# Patient Record
Sex: FEMALE | Race: WHITE | NOT HISPANIC OR LATINO | Employment: OTHER | URBAN - METROPOLITAN AREA
[De-identification: names, ages, dates, MRNs, and addresses within clinical notes are randomized per-mention and may not be internally consistent; named-entity substitution may affect disease eponyms.]

---

## 2017-03-08 ENCOUNTER — OFFICE VISIT (OUTPATIENT)
Dept: INTERNAL MEDICINE | Facility: CLINIC | Age: 52
End: 2017-03-08
Attending: INTERNAL MEDICINE
Payer: COMMERCIAL

## 2017-03-08 VITALS
BODY MASS INDEX: 20.88 KG/M2 | WEIGHT: 133 LBS | OXYGEN SATURATION: 99 % | HEIGHT: 67 IN | DIASTOLIC BLOOD PRESSURE: 72 MMHG | HEART RATE: 62 BPM | SYSTOLIC BLOOD PRESSURE: 112 MMHG

## 2017-03-08 DIAGNOSIS — R09.81 NASAL CONGESTION: ICD-10-CM

## 2017-03-08 DIAGNOSIS — Z00.00 ROUTINE ADULT HEALTH MAINTENANCE: ICD-10-CM

## 2017-03-08 DIAGNOSIS — E73.9 LACTOSE INTOLERANCE: Primary | ICD-10-CM

## 2017-03-08 PROCEDURE — G0442 ANNUAL ALCOHOL SCREEN 15 MIN: HCPCS | Mod: S$GLB,,, | Performed by: INTERNAL MEDICINE

## 2017-03-08 PROCEDURE — 99396 PREV VISIT EST AGE 40-64: CPT | Mod: 25,S$GLB,, | Performed by: INTERNAL MEDICINE

## 2017-03-08 PROCEDURE — G0444 DEPRESSION SCREEN ANNUAL: HCPCS | Mod: S$GLB,,, | Performed by: INTERNAL MEDICINE

## 2017-03-08 RX ORDER — ALPRAZOLAM 1 MG/1
TABLET ORAL
Qty: 30 TABLET | Refills: 5 | Status: SHIPPED | OUTPATIENT
Start: 2017-03-08 | End: 2017-09-13 | Stop reason: SDUPTHER

## 2017-03-08 NOTE — MR AVS SNAPSHOT
[facility]  [address], [address]  [facility] [address]  Phone: [phone]  Fax: [phone]                  Hailey Villalta   3/8/2017 10:00 AM   Office Visit    Description:  Female : 1965   Provider:  OFELIA Guallpa MD   Department:  [facility]           Reason for Visit     Follow-up     Sinusitis           Diagnoses this Visit        Comments    MS (multiple sclerosis)    -  Primary     Mild mitral and aortic regurgitation         Bicuspid aortic valve                To Do List           Future Appointments        Provider Department Dept Phone    3/9/2018 10:00 AM MD [facility] Figueroa [phone]      Goals (5 Years of Data)     None      Follow-Up and Disposition     Return in about 1 year (around 3/8/2018).       These Medications        Disp Refills Start End    alprazolam (XANAX) 1 MG tablet 30 tablet 5 3/8/2017     take 1/2 to 1 tablet by mouth NIGHTLY if needed    Pharmacy: [facility] [address] #: [phone]         [facility] On Call     [facility] On Call Nurse Care Line -  Assistance  Registered nurses in the [facility] On Call Center provide clinical advisement, health education, appointment booking, and other advisory services.  Call for this free service at [phone].             Medications           Message regarding Medications     Verify the changes and/or additions to your medication regime listed below are the same as discussed with your clinician today.  If any of these changes or additions are incorrect, please notify your healthcare provider.             Verify that the below list of medications is an accurate representation of the medications you are currently taking.  If none reported, the list may be blank. If incorrect, please contact your healthcare provider. Carry this list with you in case of emergency.           Current Medications     alprazolam (XANAX) 1 MG tablet take 1/2 to 1  "tablet by mouth NIGHTLY if needed    cetirizine (ZYRTEC) 10 MG tablet Take 10 mg by mouth once daily.    cyanocobalamin (VITAMIN B-12) 1000 MCG tablet Place 1,000 mcg under the tongue once daily.    epinephrine 0.3 mg/0.3 mL Cmpk Inject 0.3 mLs (0.3 mg total) into the muscle daily as needed.    meclizine (ANTIVERT) 25 mg tablet Take 1 tablet (25 mg total) by mouth 3 (three) times daily as needed. For vertigo/dizziness    multivitamin-zinc gluconate (MULTIVITAMINS-FORTIFIED A-D-K) 5 mg/mL Drop Take by mouth once daily.      ondansetron (ZOFRAN-ODT) 8 MG TbDL 1/2-1 tab every 8 hours as needed    pantoprazole (PROTONIX) 40 MG tablet Take 1 tablet (40 mg total) by mouth once daily.           Clinical Reference Information           Your Vitals Were     BP Pulse Height Weight Last Period SpO2    112/72 62 5' 7" (1.702 m) 60.3 kg (133 lb) 02/08/2017 (Exact Date) 99%    BMI                20.83 kg/m2          Blood Pressure          Most Recent Value    BP  112/72      Allergies as of 3/8/2017     Insect Venom    Bee Sting [Allergen Ext-venom-honey Bee]    Codeine    Prednisone      Immunizations Administered on Date of Encounter - 3/8/2017     None      Instructions    Please call [facility] radiology at [phone] to schedule your radiology test(s) - Mammogram       Language Assistance Services     ATTENTION: Language assistance services are available, free of charge. Please call [phone].      ATENCIÓN: Si habla kellee, tiene a rebolledo disposición servicios gratuitos de asistencia lingüística. Llame al [phone].     CHÚ Ý: N?u b?n nói Ti?ng Vi?t, có các d?ch v? h? tr? ngôn ng? mi?n phí dành cho b?n. G?i s? [phone].         [facility] complies with applicable Federal civil rights laws and does not discriminate on the basis of race, color, national origin, age, disability, or sex.        "

## 2017-03-08 NOTE — PROGRESS NOTES
Subjective:       Patient ID: Hailey Villalta is a 51 y.o. female.    Chief Complaint: Follow-up (6 month/ wants paper scrit for xanax) and Sinusitis (sinus drip / throat irritation)    HPI Comments: Soem nasal congestion.    Review of Systems   Constitutional: Negative.    Respiratory: Negative for shortness of breath.    Cardiovascular: Negative for chest pain.   Psychiatric/Behavioral: Negative for dysphoric mood.       Objective:      Physical Exam   Constitutional: She appears well-developed and well-nourished.   HENT:   Head: Normocephalic.   Eyes: Pupils are equal, round, and reactive to light.   Cardiovascular: Normal rate and regular rhythm.  Exam reveals no friction rub.    Murmur heard.   Systolic murmur is present with a grade of 2/6   Pulmonary/Chest: Effort normal.   Neurological: She is alert.       Assessment:       1. MS (multiple sclerosis)    2. Mild mitral and aortic regurgitation    3. Bicuspid aortic valve    4. Lactose intolerance        Plan:       Per orders and D/C instructions.  Flonase for nasal congestion.    Screening: The patient was screened for depression with the PHQ2 questionnaire and possible health consequences were discussed with the patient, who understands (15 minutes spent). The patient was screened for the misuse of alcohol, by asking the number of drinks per average week, and if pt has had more than 4 drinks (more than 3 for women and elderly) in 1 day within the past year. The health and legal consequences of misuse were discussed (15 minutes spent). The patient was screened for obesity (BMI>30), If the current BMI > 30, then the possible consequences of obesity, as well as the benefits of diet, exercise, and weight loss were discussed (15 minutes spent).

## 2017-07-11 ENCOUNTER — OFFICE VISIT (OUTPATIENT)
Dept: INTERNAL MEDICINE | Facility: CLINIC | Age: 52
End: 2017-07-11
Attending: INTERNAL MEDICINE
Payer: COMMERCIAL

## 2017-07-11 VITALS
OXYGEN SATURATION: 99 % | SYSTOLIC BLOOD PRESSURE: 110 MMHG | TEMPERATURE: 99 F | WEIGHT: 135 LBS | HEART RATE: 69 BPM | BODY MASS INDEX: 21.19 KG/M2 | HEIGHT: 67 IN | DIASTOLIC BLOOD PRESSURE: 70 MMHG

## 2017-07-11 DIAGNOSIS — G35 MS (MULTIPLE SCLEROSIS): Chronic | ICD-10-CM

## 2017-07-11 DIAGNOSIS — K59.09 CONSTIPATION, CHRONIC: ICD-10-CM

## 2017-07-11 DIAGNOSIS — J45.909 ASTHMA, CURRENTLY DORMANT: Primary | ICD-10-CM

## 2017-07-11 DIAGNOSIS — S61.216D LACERATION OF RIGHT LITTLE FINGER WITHOUT FOREIGN BODY WITHOUT DAMAGE TO NAIL, SUBSEQUENT ENCOUNTER: ICD-10-CM

## 2017-07-11 DIAGNOSIS — J02.9 SORE THROAT: ICD-10-CM

## 2017-07-11 DIAGNOSIS — J32.9 SINUSITIS, UNSPECIFIED CHRONICITY, UNSPECIFIED LOCATION: ICD-10-CM

## 2017-07-11 DIAGNOSIS — R06.2 WHEEZING: ICD-10-CM

## 2017-07-11 PROCEDURE — 96372 THER/PROPH/DIAG INJ SC/IM: CPT | Mod: S$GLB,,, | Performed by: INTERNAL MEDICINE

## 2017-07-11 PROCEDURE — 99214 OFFICE O/P EST MOD 30 MIN: CPT | Mod: 25,S$GLB,, | Performed by: INTERNAL MEDICINE

## 2017-07-11 RX ORDER — DOXYCYCLINE HYCLATE 100 MG
100 TABLET ORAL 2 TIMES DAILY
Qty: 14 TABLET | Refills: 0 | Status: SHIPPED | OUTPATIENT
Start: 2017-07-11 | End: 2017-08-21

## 2017-07-11 RX ORDER — CEFTRIAXONE 1 G/1
1 INJECTION, POWDER, FOR SOLUTION INTRAMUSCULAR; INTRAVENOUS
Status: COMPLETED | OUTPATIENT
Start: 2017-07-11 | End: 2017-07-11

## 2017-07-11 RX ORDER — TRIAMCINOLONE ACETONIDE 40 MG/ML
40 INJECTION, SUSPENSION INTRA-ARTICULAR; INTRAMUSCULAR
Status: COMPLETED | OUTPATIENT
Start: 2017-07-11 | End: 2017-07-11

## 2017-07-11 RX ORDER — METHYLPREDNISOLONE ACETATE 80 MG/ML
80 INJECTION, SUSPENSION INTRA-ARTICULAR; INTRALESIONAL; INTRAMUSCULAR; SOFT TISSUE ONCE
Status: COMPLETED | OUTPATIENT
Start: 2017-07-11 | End: 2017-07-11

## 2017-07-11 RX ADMIN — TRIAMCINOLONE ACETONIDE 40 MG: 40 INJECTION, SUSPENSION INTRA-ARTICULAR; INTRAMUSCULAR at 03:07

## 2017-07-11 RX ADMIN — CEFTRIAXONE 1 G: 1 INJECTION, POWDER, FOR SOLUTION INTRAMUSCULAR; INTRAVENOUS at 03:07

## 2017-07-11 RX ADMIN — METHYLPREDNISOLONE ACETATE 80 MG: 80 INJECTION, SUSPENSION INTRA-ARTICULAR; INTRALESIONAL; INTRAMUSCULAR; SOFT TISSUE at 03:07

## 2017-07-11 NOTE — PROGRESS NOTES
Subjective:       Patient ID: Hailey Villalta is a 51 y.o. female.    Chief Complaint: Sinus Problem (nasal congestion / drip / green mucus); Headache; and Sore Throat    Cut right 5th finger night of 7/09/17 with a veggie cutter. Got stiches in .  Several days sinus congestion with green D/C.  Subj fever.  Sore throat since yest.      Sinus Problem   This is a new problem. The current episode started in the past 7 days. The problem has been rapidly worsening since onset. Associated symptoms include congestion, headaches and a sore throat. Pertinent negatives include no shortness of breath.   Headache    This is a new problem. The current episode started in the past 7 days. The problem has been unchanged. Associated symptoms include a sore throat.   Sore Throat    This is a new problem. The current episode started yesterday. The problem has been rapidly worsening. Associated symptoms include congestion and headaches. Pertinent negatives include no shortness of breath.     Review of Systems   Constitutional: Negative.    HENT: Positive for congestion and sore throat.    Respiratory: Positive for wheezing. Negative for shortness of breath.    Cardiovascular: Negative for chest pain.   Neurological: Positive for headaches.       Objective:      Physical Exam   Constitutional: She appears well-developed and well-nourished.   HENT:   Head: Normocephalic.   Eyes: Pupils are equal, round, and reactive to light.   Cardiovascular: Normal rate and regular rhythm.  Exam reveals no friction rub.    Murmur heard.   Systolic murmur is present with a grade of 2/6   Pulmonary/Chest: Effort normal. She has wheezes.   Musculoskeletal:   Sutures in right 5th finger tip with some ecchymosis.   Neurological: She is alert.       Assessment:       1. Asthma, currently dormant    2. Constipation, chronic    3. MS (multiple sclerosis)    4. Sinusitis, unspecified chronicity, unspecified location    5. Sore throat    6.  Wheezing    7. Laceration of right little finger without foreign body without damage to nail, subsequent encounter        Plan:       Per orders and D/C instructions.  Continue meds/diet for Asthma and MS, which are stable.  Rocephin, Steroid shot, and Doxy for sinusitis, sore throat, and wheezing.  Rto 7/20 for suture removal.

## 2017-07-20 ENCOUNTER — OFFICE VISIT (OUTPATIENT)
Dept: INTERNAL MEDICINE | Facility: CLINIC | Age: 52
End: 2017-07-20
Attending: INTERNAL MEDICINE
Payer: COMMERCIAL

## 2017-07-20 VITALS
HEART RATE: 74 BPM | SYSTOLIC BLOOD PRESSURE: 120 MMHG | BODY MASS INDEX: 21.19 KG/M2 | HEIGHT: 67 IN | OXYGEN SATURATION: 98 % | WEIGHT: 135 LBS | DIASTOLIC BLOOD PRESSURE: 68 MMHG

## 2017-07-20 DIAGNOSIS — D50.9 IRON DEFICIENCY ANEMIA, UNSPECIFIED IRON DEFICIENCY ANEMIA TYPE: ICD-10-CM

## 2017-07-20 DIAGNOSIS — J45.909 ASTHMA, CURRENTLY DORMANT: ICD-10-CM

## 2017-07-20 DIAGNOSIS — E03.9 HYPOTHYROIDISM, UNSPECIFIED TYPE: ICD-10-CM

## 2017-07-20 DIAGNOSIS — Z00.00 ROUTINE ADULT HEALTH MAINTENANCE: Primary | ICD-10-CM

## 2017-07-20 DIAGNOSIS — G35 MS (MULTIPLE SCLEROSIS): Primary | Chronic | ICD-10-CM

## 2017-07-20 DIAGNOSIS — E78.9 DISORDER OF LIPID METABOLISM: ICD-10-CM

## 2017-07-20 DIAGNOSIS — I08.0 MILD MITRAL AND AORTIC REGURGITATION: ICD-10-CM

## 2017-07-20 DIAGNOSIS — E55.9 VITAMIN D DEFICIENCY: ICD-10-CM

## 2017-07-20 DIAGNOSIS — D51.0 PERNICIOUS ANEMIA: ICD-10-CM

## 2017-07-20 DIAGNOSIS — E73.9 LACTOSE INTOLERANCE: ICD-10-CM

## 2017-07-20 DIAGNOSIS — R79.89 OTHER ABNORMAL BLOOD CHEMISTRY: ICD-10-CM

## 2017-07-20 DIAGNOSIS — S61.219D LACERATION OF FINGER OF RIGHT HAND WITHOUT FOREIGN BODY WITHOUT DAMAGE TO NAIL, UNSPECIFIED FINGER, SUBSEQUENT ENCOUNTER: ICD-10-CM

## 2017-07-20 DIAGNOSIS — K21.9 GASTROESOPHAGEAL REFLUX DISEASE, ESOPHAGITIS PRESENCE NOT SPECIFIED: ICD-10-CM

## 2017-07-20 PROCEDURE — 99214 OFFICE O/P EST MOD 30 MIN: CPT | Mod: S$GLB,,, | Performed by: INTERNAL MEDICINE

## 2017-07-20 RX ORDER — PANTOPRAZOLE SODIUM 40 MG/1
40 TABLET, DELAYED RELEASE ORAL DAILY
Qty: 30 TABLET | Refills: 1 | Status: SHIPPED | OUTPATIENT
Start: 2017-07-20 | End: 2018-03-09 | Stop reason: SDUPTHER

## 2017-07-20 NOTE — PROGRESS NOTES
Subjective:       Patient ID: Hailey Villalta is a 51 y.o. female.    Chief Complaint: Suture / Staple Removal and GI Problem (burning,cramping / wants bloddwork if possible)    Recent abd discomfort. Soem pain, nausea, and diarrhea.      Suture / Staple Removal   The sutures were placed 11 to 14 days ago. She tried antibiotic ointment use since the wound repair. The treatment provided significant relief. There has been clear discharge from the wound. There is no redness present. There is no swelling present. The pain has no pain. She has no difficulty moving the affected extremity or digit.   GI Problem   The primary symptoms include abdominal pain and diarrhea.     Review of Systems   Constitutional: Negative.    Respiratory: Negative for shortness of breath.    Cardiovascular: Negative for chest pain.   Gastrointestinal: Positive for abdominal distention, abdominal pain and diarrhea.       Objective:      Physical Exam   Constitutional: She appears well-developed and well-nourished.   HENT:   Head: Normocephalic.   Eyes: Pupils are equal, round, and reactive to light.   Cardiovascular: Normal rate and regular rhythm.  Exam reveals no friction rub.    Murmur heard.   Systolic murmur is present with a grade of 2/6   Pulmonary/Chest: Effort normal.   Musculoskeletal:   Sutures in right 5th finger tip with some ecchymosis.   Neurological: She is alert.       Assessment:       1. MS (multiple sclerosis)    2. Mild mitral and aortic regurgitation    3. Asthma, currently dormant    4. Gastroesophageal reflux disease, esophagitis presence not specified    5. Lactose intolerance    6. Laceration of finger of right hand without foreign body without damage to nail, unspecified finger, subsequent encounter        Plan:       Per orders and D/C instructions.  Continue meds/diet for MS, AVR, MVR, and Asthma, which are stable.     Avoid dairy. PPI for 2 months for GERD.  3 sutures removed.

## 2017-07-23 LAB
25(OH)D3+25(OH)D2 SERPL-MCNC: 41 NG/ML (ref 30–100)
ALBUMIN SERPL-MCNC: 5.1 G/DL (ref 3.6–5.1)
ALBUMIN/GLOB SERPL: 1.8 (CALC) (ref 1–2.5)
ALP SERPL-CCNC: 58 U/L (ref 33–130)
ALT SERPL-CCNC: 14 U/L (ref 6–29)
AMYLASE SERPL-CCNC: 38 U/L (ref 21–101)
AST SERPL-CCNC: 19 U/L (ref 10–35)
BASOPHILS # BLD AUTO: 19 CELLS/UL (ref 0–200)
BASOPHILS NFR BLD AUTO: 0.3 %
BILIRUB SERPL-MCNC: 1.2 MG/DL (ref 0.2–1.2)
BUN SERPL-MCNC: 11 MG/DL (ref 7–25)
BUN/CREAT SERPL: ABNORMAL (CALC) (ref 6–22)
CALCIUM SERPL-MCNC: 10.3 MG/DL (ref 8.6–10.4)
CHLORIDE SERPL-SCNC: 97 MMOL/L (ref 98–110)
CHOLEST SERPL-MCNC: 181 MG/DL (ref 125–200)
CHOLEST/HDLC SERPL: 2.9 (CALC)
CK SERPL-CCNC: 41 U/L (ref 29–143)
CO2 SERPL-SCNC: 31 MMOL/L (ref 20–31)
CREAT SERPL-MCNC: 0.75 MG/DL (ref 0.5–1.05)
CRP SERPL-MCNC: <0.1 MG/DL
EOSINOPHIL # BLD AUTO: 118 CELLS/UL (ref 15–500)
EOSINOPHIL NFR BLD AUTO: 1.9 %
ERYTHROCYTE [DISTWIDTH] IN BLOOD BY AUTOMATED COUNT: 11.9 % (ref 11–15)
GFR SERPL CREATININE-BSD FRML MDRD: 93 ML/MIN/1.73M2
GLOBULIN SER CALC-MCNC: 2.9 G/DL (CALC) (ref 1.9–3.7)
GLUCOSE SERPL-MCNC: 89 MG/DL (ref 65–99)
HBA1C MFR BLD: 4.8 % OF TOTAL HGB
HCT VFR BLD AUTO: 40.4 % (ref 35–45)
HDLC SERPL-MCNC: 63 MG/DL
HGB BLD-MCNC: 14.3 G/DL (ref 11.7–15.5)
LDLC SERPL CALC-MCNC: 99 MG/DL (CALC)
LYMPHOCYTES # BLD AUTO: 1420 CELLS/UL (ref 850–3900)
LYMPHOCYTES NFR BLD AUTO: 22.9 %
MCH RBC QN AUTO: 32.3 PG (ref 27–33)
MCHC RBC AUTO-ENTMCNC: 35.4 G/DL (ref 32–36)
MCV RBC AUTO: 91.2 FL (ref 80–100)
MONOCYTES # BLD AUTO: 484 CELLS/UL (ref 200–950)
MONOCYTES NFR BLD AUTO: 7.8 %
NEUTROPHILS # BLD AUTO: 4160 CELLS/UL (ref 1500–7800)
NEUTROPHILS NFR BLD AUTO: 67.1 %
NONHDLC SERPL-MCNC: 118 MG/DL (CALC)
PLATELET # BLD AUTO: 244 THOUSAND/UL (ref 140–400)
PMV BLD REES-ECKER: 8.9 FL (ref 7.5–12.5)
POTASSIUM SERPL-SCNC: 4.1 MMOL/L (ref 3.5–5.3)
PROT SERPL-MCNC: 8 G/DL (ref 6.1–8.1)
RBC # BLD AUTO: 4.43 MILLION/UL (ref 3.8–5.1)
SODIUM SERPL-SCNC: 137 MMOL/L (ref 135–146)
TRIGL SERPL-MCNC: 96 MG/DL
TSH SERPL-ACNC: 2.82 MIU/L
VIT B12 SERPL-MCNC: 616 PG/ML (ref 200–1100)
VITAMIN D2 SERPL-MCNC: <4 NG/ML
VITAMIN D3 SERPL-MCNC: 41 NG/ML
WBC # BLD AUTO: 6.2 THOUSAND/UL (ref 3.8–10.8)

## 2017-08-07 DIAGNOSIS — T78.2XXA ANAPHYLACTIC REACTION, INITIAL ENCOUNTER: ICD-10-CM

## 2017-08-07 RX ORDER — EPINEPHRINE 0.3 MG/.3ML
1 INJECTION SUBCUTANEOUS ONCE
Qty: 2 DEVICE | Refills: 5 | Status: SHIPPED | OUTPATIENT
Start: 2017-08-07 | End: 2018-03-09 | Stop reason: SDUPTHER

## 2017-08-21 ENCOUNTER — OFFICE VISIT (OUTPATIENT)
Dept: INTERNAL MEDICINE | Facility: CLINIC | Age: 52
End: 2017-08-21
Attending: INTERNAL MEDICINE
Payer: COMMERCIAL

## 2017-08-21 VITALS
HEART RATE: 55 BPM | BODY MASS INDEX: 20.72 KG/M2 | DIASTOLIC BLOOD PRESSURE: 82 MMHG | WEIGHT: 132 LBS | HEIGHT: 67 IN | OXYGEN SATURATION: 97 % | SYSTOLIC BLOOD PRESSURE: 128 MMHG

## 2017-08-21 DIAGNOSIS — K21.9 GASTROESOPHAGEAL REFLUX DISEASE, ESOPHAGITIS PRESENCE NOT SPECIFIED: ICD-10-CM

## 2017-08-21 DIAGNOSIS — R10.9 ABDOMINAL PAIN, UNSPECIFIED LOCATION: Primary | ICD-10-CM

## 2017-08-21 DIAGNOSIS — R11.2 NAUSEA AND VOMITING, INTRACTABILITY OF VOMITING NOT SPECIFIED, UNSPECIFIED VOMITING TYPE: ICD-10-CM

## 2017-08-21 DIAGNOSIS — R63.0 ANOREXIA: ICD-10-CM

## 2017-08-21 DIAGNOSIS — R49.0 HOARSE: ICD-10-CM

## 2017-08-21 DIAGNOSIS — G35 MS (MULTIPLE SCLEROSIS): Chronic | ICD-10-CM

## 2017-08-21 PROCEDURE — 3008F BODY MASS INDEX DOCD: CPT | Mod: S$GLB,,, | Performed by: INTERNAL MEDICINE

## 2017-08-21 PROCEDURE — 99214 OFFICE O/P EST MOD 30 MIN: CPT | Mod: S$GLB,,, | Performed by: INTERNAL MEDICINE

## 2017-08-21 NOTE — PROGRESS NOTES
Subjective:       Patient ID: Hailey Villalta is a 51 y.o. female.    Chief Complaint: Fatigue; Hoarse; GI Problem (stomach discomfort when she eats or drinks ( right lower abdomen )); Emesis (when she eats ); Insomnia (because of GI problems); and Chest Pain (left side)    On pantoprazole for past 1 month, and GERD has gotten worse.  Afraid to eat. Lost 3 lb in 1 month.  Eats, then gets abd fullness and chest pain, then N, V.  Had diarrhea, but resolved.      GI Problem   The primary symptoms include abdominal pain, nausea, vomiting and diarrhea.   Emesis    This is a new problem. The current episode started 1 to 4 weeks ago. The problem has been unchanged. Associated symptoms include abdominal pain, chest pain and diarrhea.     Review of Systems   Constitutional: Negative.    Respiratory: Negative for shortness of breath.    Cardiovascular: Positive for chest pain.   Gastrointestinal: Positive for abdominal pain, diarrhea, nausea and vomiting.       Objective:      Physical Exam   Constitutional: She appears well-developed and well-nourished.   HENT:   Head: Normocephalic.   Eyes: Pupils are equal, round, and reactive to light.   Cardiovascular: Normal rate and regular rhythm.  Exam reveals no friction rub.    Murmur heard.   Diastolic murmur is present with a grade of 2/6   Pulmonary/Chest: Effort normal.   Abdominal: Soft. Bowel sounds are normal. There is no hepatosplenomegaly. There is tenderness. There is no rebound, no guarding and negative Luna's sign.   Neurological: She is alert.       Assessment:       1. Abdominal pain, unspecified location    2. MS (multiple sclerosis)    3. Gastroesophageal reflux disease, esophagitis presence not specified    4. Nausea and vomiting, intractability of vomiting not specified, unspecified vomiting type    5. Anorexia    6. Hoarse        Plan:       Per orders and D/C instructions.  Cont PPI. Needs EGD ASAP.  Consider Abd CT.

## 2017-08-31 ENCOUNTER — OFFICE VISIT (OUTPATIENT)
Dept: GASTROENTEROLOGY | Facility: CLINIC | Age: 52
End: 2017-08-31
Payer: COMMERCIAL

## 2017-08-31 VITALS
DIASTOLIC BLOOD PRESSURE: 82 MMHG | HEIGHT: 67 IN | WEIGHT: 134.06 LBS | BODY MASS INDEX: 21.04 KG/M2 | SYSTOLIC BLOOD PRESSURE: 131 MMHG

## 2017-08-31 DIAGNOSIS — R10.31 ABDOMINAL PAIN, RLQ (RIGHT LOWER QUADRANT): Primary | ICD-10-CM

## 2017-08-31 DIAGNOSIS — R10.13 ABDOMINAL PAIN, EPIGASTRIC: ICD-10-CM

## 2017-08-31 DIAGNOSIS — K21.9 GASTROESOPHAGEAL REFLUX DISEASE, ESOPHAGITIS PRESENCE NOT SPECIFIED: ICD-10-CM

## 2017-08-31 DIAGNOSIS — R10.13 DYSPEPSIA: ICD-10-CM

## 2017-08-31 DIAGNOSIS — R13.10 DYSPHAGIA, UNSPECIFIED TYPE: ICD-10-CM

## 2017-08-31 PROCEDURE — 3008F BODY MASS INDEX DOCD: CPT | Mod: S$GLB,,, | Performed by: NURSE PRACTITIONER

## 2017-08-31 PROCEDURE — 99204 OFFICE O/P NEW MOD 45 MIN: CPT | Mod: S$GLB,,, | Performed by: NURSE PRACTITIONER

## 2017-08-31 PROCEDURE — 99999 PR PBB SHADOW E&M-EST. PATIENT-LVL III: CPT | Mod: PBBFAC,,, | Performed by: NURSE PRACTITIONER

## 2017-08-31 NOTE — PROGRESS NOTES
"Subjective:       Patient ID: Hailey Villalta is a 51 y.o. female.    Chief Complaint: Gastroesophageal Reflux; Abdominal Pain; and Endoscopy    HPI  Reports RLQ abdominal pain that is worse after eating with the sensation that food "was getting stuck" "like "I had an obstruction".     Was also having complaints of episodic diarrhea and vomiting occuring urgently with 5-6 episodes over the last year.  She has colonoscopy last year ( records reviewed ) with polyps removed.  CT was recommended but her mother was diagnosed with terminal cancer and she has been away caring for her and did not have this completed.  She has had more recent complaints over the last month of severe heartburn, acid reflux and indigestion with dysphagia and chest discomfort.  She has water brash and experiences hoarseness.  Complaints of reflux are worse at night or when lying down.  She has had weight loss of 6 pounds.  She is a vegetarian and typically eats small meals through the day.  She was recently started on protonix which has not improved her complaints.    She is s/p cholecystectomy.    She has chronic constipation with no recent change in bowel habits, blood with bowel movements or black stools.   She has never had EGD.    She is under increased stress caring for her mother in [place] who is terminally ill.    Review of Systems   Constitutional: Positive for appetite change and unexpected weight change. Negative for activity change, fatigue and fever.   HENT: Positive for sore throat, trouble swallowing and voice change.    Respiratory: Positive for chest tightness. Negative for shortness of breath.    Cardiovascular: Positive for chest pain.   Gastrointestinal: Positive for abdominal pain, constipation and nausea. Negative for abdominal distention, blood in stool, diarrhea and vomiting.   Genitourinary: Negative.    Musculoskeletal: Negative.    Skin: Negative.    Neurological: Negative.         MS "   Psychiatric/Behavioral: Negative.        Objective:      Physical Exam   Constitutional: She is oriented to person, place, and time. She appears well-developed and well-nourished. No distress.   HENT:   Head: Normocephalic.   Eyes: No scleral icterus.   Neck: Neck supple.   Cardiovascular: Normal rate.    Pulmonary/Chest: Effort normal. No respiratory distress.   Abdominal: Soft. Bowel sounds are normal. She exhibits no distension and no mass. There is tenderness in the right upper quadrant, right lower quadrant and epigastric area. There is no guarding.   Musculoskeletal: Normal range of motion.   Neurological: She is alert and oriented to person, place, and time.   Skin: Skin is warm and dry. She is not diaphoretic.   Psychiatric: She has a normal mood and affect. Her behavior is normal. Judgment and thought content normal.   Vitals reviewed.      Assessment:       1. Abdominal pain, RLQ (right lower quadrant)    2. Abdominal pain, epigastric    3. Gastroesophageal reflux disease, esophagitis presence not specified    4. Dysphagia, unspecified type    5. Dyspepsia        Plan:         Hailey was seen today for gastroesophageal reflux, abdominal pain and endoscopy.    Diagnoses and all orders for this visit:    Abdominal pain, RLQ (right lower quadrant)  -     CT Abdomen Pelvis W Wo Contrast; Future  -     Case request GI: ESOPHAGOGASTRODUODENOSCOPY (EGD)    Abdominal pain, epigastric  -     CT Abdomen Pelvis W Wo Contrast; Future  -     Case request GI: ESOPHAGOGASTRODUODENOSCOPY (EGD)    Gastroesophageal reflux disease, esophagitis presence not specified    Dysphagia, unspecified type    Dyspepsia    Continue PPI.    Discussed reflux prevention.

## 2017-09-08 ENCOUNTER — HOSPITAL ENCOUNTER (OUTPATIENT)
Dept: RADIOLOGY | Facility: HOSPITAL | Age: 52
Discharge: HOME | End: 2017-09-08
Attending: NURSE PRACTITIONER
Payer: COMMERCIAL

## 2017-09-08 DIAGNOSIS — R10.13 ABDOMINAL PAIN, EPIGASTRIC: ICD-10-CM

## 2017-09-08 DIAGNOSIS — R10.31 ABDOMINAL PAIN, RLQ (RIGHT LOWER QUADRANT): ICD-10-CM

## 2017-09-08 PROCEDURE — 74178 CT ABD&PLV WO CNTR FLWD CNTR: CPT | Mod: TC

## 2017-09-08 PROCEDURE — 25500020 PHARM REV CODE 255: Performed by: NURSE PRACTITIONER

## 2017-09-08 PROCEDURE — 74178 CT ABD&PLV WO CNTR FLWD CNTR: CPT | Mod: 26,,, | Performed by: RADIOLOGY

## 2017-09-08 RX ADMIN — IOHEXOL 75 ML: 350 INJECTION, SOLUTION INTRAVENOUS at 09:09

## 2017-09-08 RX ADMIN — IOHEXOL 30 ML: 350 INJECTION, SOLUTION INTRAVENOUS at 07:09

## 2017-09-11 ENCOUNTER — TELEPHONE (OUTPATIENT)
Dept: GASTROENTEROLOGY | Facility: CLINIC | Age: 52
End: 2017-09-11

## 2017-09-11 NOTE — TELEPHONE ENCOUNTER
Rescheduled EGD to September 26. Needs to be the first case due to transportation. Beata notified. notified

## 2017-09-13 ENCOUNTER — TELEPHONE (OUTPATIENT)
Dept: GASTROENTEROLOGY | Facility: CLINIC | Age: 52
End: 2017-09-13

## 2017-09-13 DIAGNOSIS — N83.201 CYST OF OVARY, RIGHT: Primary | ICD-10-CM

## 2017-09-13 DIAGNOSIS — K76.9 LIVER LESION: Primary | ICD-10-CM

## 2017-09-13 RX ORDER — ALPRAZOLAM 1 MG/1
TABLET ORAL
Qty: 30 TABLET | Refills: 1 | Status: SHIPPED | OUTPATIENT
Start: 2017-09-13 | End: 2017-11-15 | Stop reason: SDUPTHER

## 2017-09-13 NOTE — TELEPHONE ENCOUNTER
Reviewed all CT findings with patient.      Will see gynecologist Friday Dr. Carlson - please fax CT report to her. Phone number [phone]- please call for fax number.    Will FU with PCP regarding lung nodules.  Reviewed Fleischner guidelines with patient.  She is a non smoker.    Abdominal US to evaluate liver lesion further.  She is concerned as she has had elevated LFT previously    Please call to schedule US and fax CT report.

## 2017-09-13 NOTE — TELEPHONE ENCOUNTER
----- Message from JONATAN Pineda sent at 9/13/2017  9:00 AM CDT -----  I have tried to reach her several times by phone with no answer or call back.  Please let her know that she has a right ovarian cyst which may be the cause of her pain.  She needs to see gynecologist for further evaluation.  Also shows constipation, which has been a chronic complaint for her.  She has an abnormal area in the liver which appears benign.  Liver enzymes on labs are normal and we should just recheck this in 6 months with US of the liver, please place on recall.

## 2017-09-13 NOTE — TELEPHONE ENCOUNTER
Patient is scheduled for her US abdomen on 09/22/2017 in [place]. Patient's CT report results was faxed to Dr. Carlson office at [phone].

## 2017-09-15 ENCOUNTER — OFFICE VISIT (OUTPATIENT)
Dept: INTERNAL MEDICINE | Facility: CLINIC | Age: 52
End: 2017-09-15
Attending: INTERNAL MEDICINE
Payer: COMMERCIAL

## 2017-09-15 VITALS
HEART RATE: 69 BPM | SYSTOLIC BLOOD PRESSURE: 112 MMHG | WEIGHT: 136 LBS | HEIGHT: 67 IN | DIASTOLIC BLOOD PRESSURE: 80 MMHG | BODY MASS INDEX: 21.35 KG/M2 | OXYGEN SATURATION: 99 %

## 2017-09-15 DIAGNOSIS — K21.9 GASTROESOPHAGEAL REFLUX DISEASE, ESOPHAGITIS PRESENCE NOT SPECIFIED: ICD-10-CM

## 2017-09-15 DIAGNOSIS — J45.909 ASTHMA, CURRENTLY DORMANT: ICD-10-CM

## 2017-09-15 DIAGNOSIS — K59.09 CONSTIPATION, CHRONIC: Primary | ICD-10-CM

## 2017-09-15 DIAGNOSIS — K76.89 LIVER CYST: ICD-10-CM

## 2017-09-15 DIAGNOSIS — R91.8 PULMONARY NODULES: ICD-10-CM

## 2017-09-15 DIAGNOSIS — N83.209 OVARIAN CYST: ICD-10-CM

## 2017-09-15 PROCEDURE — 99214 OFFICE O/P EST MOD 30 MIN: CPT | Mod: S$GLB,,, | Performed by: INTERNAL MEDICINE

## 2017-09-15 PROCEDURE — 3008F BODY MASS INDEX DOCD: CPT | Mod: S$GLB,,, | Performed by: INTERNAL MEDICINE

## 2017-09-15 RX ORDER — VILAZODONE HYDROCHLORIDE 20 MG/1
1 TABLET ORAL NIGHTLY
Qty: 30 TABLET | Refills: 11 | Status: SHIPPED | OUTPATIENT
Start: 2017-09-15 | End: 2017-11-30

## 2017-09-15 NOTE — PROGRESS NOTES
Subjective:       Patient ID: Hailey Villalta is a 51 y.o. female.    Chief Complaint: Follow-up (review test results) and Sinus Problem (head congestion)    Still gets N,V. Sched for EGD.  Recent CT showed small pulmonary nodules, liver cyst, and an ovarian cyst.  Mother is terminally ill in [place].  Seh is very stressed.      Review of Systems   Constitutional: Negative.    Respiratory: Negative for shortness of breath.    Cardiovascular: Negative for chest pain.   Gastrointestinal: Positive for abdominal pain, nausea and vomiting.   Psychiatric/Behavioral: The patient is nervous/anxious.        Objective:      Physical Exam   Constitutional: She appears well-developed and well-nourished.   HENT:   Head: Normocephalic.   Eyes: Pupils are equal, round, and reactive to light.   Cardiovascular: Normal rate and regular rhythm.  Exam reveals no friction rub.    Murmur heard.   Diastolic murmur is present with a grade of 2/6   Pulmonary/Chest: Effort normal.   Abdominal: Soft. Bowel sounds are normal. There is no hepatosplenomegaly. There is tenderness. There is no rebound, no guarding and negative Luna's sign.   Neurological: She is alert.       Assessment:       1. Constipation, chronic    2. Gastroesophageal reflux disease, esophagitis presence not specified    3. Pulmonary nodules    4. Liver cyst    5. Ovarian cyst    6. Asthma, currently dormant        Plan:       Per orders and D/C instructions.  Continue meds/diet for Asthma, constipation, and GERD, which are stable.  No f/u for small pulmonary nodules.  F/u with GI for liver cyst.  F/u with Gyn for ovarian cyst.  Script for Viibryd for anxiety. She is reluctant to take it.

## 2017-09-22 ENCOUNTER — HOSPITAL ENCOUNTER (OUTPATIENT)
Dept: RADIOLOGY | Facility: HOSPITAL | Age: 52
Discharge: HOME | End: 2017-09-22
Attending: INTERNAL MEDICINE
Payer: COMMERCIAL

## 2017-09-22 ENCOUNTER — TELEPHONE (OUTPATIENT)
Dept: GASTROENTEROLOGY | Facility: CLINIC | Age: 52
End: 2017-09-22

## 2017-09-22 ENCOUNTER — HOSPITAL ENCOUNTER (OUTPATIENT)
Dept: RADIOLOGY | Facility: HOSPITAL | Age: 52
Discharge: HOME | End: 2017-09-22
Attending: NURSE PRACTITIONER
Payer: COMMERCIAL

## 2017-09-22 DIAGNOSIS — K76.9 LIVER LESION: ICD-10-CM

## 2017-09-22 DIAGNOSIS — N83.201 CYST OF OVARY, RIGHT: ICD-10-CM

## 2017-09-22 PROCEDURE — 76700 US EXAM ABDOM COMPLETE: CPT | Mod: TC

## 2017-09-22 PROCEDURE — 76830 TRANSVAGINAL US NON-OB: CPT | Mod: 26,,, | Performed by: RADIOLOGY

## 2017-09-22 PROCEDURE — 76856 US EXAM PELVIC COMPLETE: CPT | Mod: 26,,, | Performed by: RADIOLOGY

## 2017-09-22 PROCEDURE — 76700 US EXAM ABDOM COMPLETE: CPT | Mod: 26,,, | Performed by: RADIOLOGY

## 2017-09-22 PROCEDURE — 76856 US EXAM PELVIC COMPLETE: CPT | Mod: TC

## 2017-09-22 NOTE — TELEPHONE ENCOUNTER
Informed pt of her US results . Pt was upset because the US results did not show here Ovaries.   Pt disconnected the phone call when this was explained to her.

## 2017-09-22 NOTE — TELEPHONE ENCOUNTER
----- Message from JONATAN Pineda sent at 9/22/2017 12:51 PM CDT -----  Let her know US looks good,  the area seen on the liver on CT is not seen on the US

## 2017-09-25 PROBLEM — K29.30 SUPERFICIAL GASTRITIS WITHOUT HEMORRHAGE: Status: ACTIVE | Noted: 2017-09-25

## 2017-09-26 ENCOUNTER — TELEPHONE (OUTPATIENT)
Dept: GASTROENTEROLOGY | Facility: CLINIC | Age: 52
End: 2017-09-26

## 2017-09-26 NOTE — TELEPHONE ENCOUNTER
Please let her know that the US we were calling about was her abdominal US, ordered to look at her liver.  She also had a pelvic US the same day as her abdominal US, which did look at her ovaries.  She will need to follow up with the doctor that ordered this to get results regarding ovaries if he has not already contacted her.

## 2017-10-26 DIAGNOSIS — R91.8 PULMONARY NODULES: Primary | ICD-10-CM

## 2017-11-15 RX ORDER — ALPRAZOLAM 1 MG/1
TABLET ORAL
Qty: 30 TABLET | Refills: 1 | Status: SHIPPED | OUTPATIENT
Start: 2017-11-15 | End: 2018-01-29 | Stop reason: SDUPTHER

## 2017-11-30 ENCOUNTER — OFFICE VISIT (OUTPATIENT)
Dept: INTERNAL MEDICINE | Facility: CLINIC | Age: 52
End: 2017-11-30
Attending: INTERNAL MEDICINE
Payer: COMMERCIAL

## 2017-11-30 VITALS
BODY MASS INDEX: 20.72 KG/M2 | TEMPERATURE: 99 F | SYSTOLIC BLOOD PRESSURE: 132 MMHG | WEIGHT: 132 LBS | OXYGEN SATURATION: 99 % | HEIGHT: 67 IN | DIASTOLIC BLOOD PRESSURE: 72 MMHG | HEART RATE: 60 BPM

## 2017-11-30 DIAGNOSIS — L29.9 ITCHY SKIN: ICD-10-CM

## 2017-11-30 DIAGNOSIS — N83.201 CYST OF RIGHT OVARY: ICD-10-CM

## 2017-11-30 DIAGNOSIS — R91.8 PULMONARY NODULES: ICD-10-CM

## 2017-11-30 DIAGNOSIS — K21.9 GASTROESOPHAGEAL REFLUX DISEASE, ESOPHAGITIS PRESENCE NOT SPECIFIED: Primary | ICD-10-CM

## 2017-11-30 DIAGNOSIS — R05.9 COUGH: ICD-10-CM

## 2017-11-30 DIAGNOSIS — R09.81 NASAL CONGESTION: ICD-10-CM

## 2017-11-30 DIAGNOSIS — R06.02 SOB (SHORTNESS OF BREATH): ICD-10-CM

## 2017-11-30 PROCEDURE — 99214 OFFICE O/P EST MOD 30 MIN: CPT | Mod: S$GLB,,, | Performed by: INTERNAL MEDICINE

## 2017-11-30 RX ORDER — ALBUTEROL SULFATE 90 UG/1
2 AEROSOL, METERED RESPIRATORY (INHALATION) EVERY 6 HOURS PRN
Qty: 18 G | Refills: 0
Start: 2017-11-30 | End: 2018-06-05 | Stop reason: SDUPTHER

## 2017-11-30 RX ORDER — FLUTICASONE PROPIONATE 50 MCG
2 SPRAY, SUSPENSION (ML) NASAL DAILY
COMMUNITY

## 2017-11-30 RX ORDER — CETIRIZINE HYDROCHLORIDE 10 MG/1
10 TABLET ORAL DAILY PRN
COMMUNITY

## 2017-11-30 NOTE — PROGRESS NOTES
Subjective:       Patient ID: Hailey Villalta is a 52 y.o. female.    Chief Complaint: Cough (went to  ( Amoxil, steroid inj) stopped amoxil because it made her sick. Started Z tina yesterday); Sinus Problem (nasal congestion, head congestion ( Arbinoxa made her sick)); and Urticaria (since starting Z tina)    Went to  on 11/28 for sinus congestion and sore throat.  Got a Steroid shot and started Amoxil and Carbinoxamine. Got dizzy and stopped both of those meds and started a Z-pack.  Developed red rash on face and itchy chest and throat. Better with Benadryl and stopping Z-pack.      Cough   This is a new problem. The problem has been unchanged. The problem occurs every few minutes. The cough is non-productive. Associated symptoms include nasal congestion, a rash, a sore throat and shortness of breath. Pertinent negatives include no chest pain.   Sinus Problem   This is a new problem. The current episode started in the past 7 days. The problem has been gradually improving since onset. There has been no fever. Associated symptoms include coughing, shortness of breath and a sore throat.   Urticaria   This is a recurrent problem. The current episode started in the past 7 days. The problem has been gradually improving since onset. Associated symptoms include coughing, shortness of breath and a sore throat.     Review of Systems   Constitutional: Negative.    HENT: Positive for sore throat.    Respiratory: Positive for cough and shortness of breath.    Cardiovascular: Negative for chest pain.   Skin: Positive for rash.   Neurological: Positive for dizziness.       Objective:      Physical Exam   Constitutional: She appears well-developed and well-nourished.   HENT:   Head: Normocephalic.   Eyes: Pupils are equal, round, and reactive to light.   Cardiovascular: Normal rate and regular rhythm.  Exam reveals no friction rub.    Murmur heard.   Systolic murmur is present with a grade of 2/6   Pulmonary/Chest:  Effort normal. She has wheezes.   Mild exp wheeze.   Neurological: She is alert.       Assessment:       1. Gastroesophageal reflux disease, esophagitis presence not specified    2. Cough    3. Pulmonary nodules    4. Nasal congestion    5. SOB (shortness of breath)    6. Itchy skin        Plan:       Per orders and D/C instructions.  Continue meds/diet for GERD, which is stable.Zyrtec, Flonase, and Albuterol for congestion, itch, and cough.

## 2017-12-01 ENCOUNTER — HOSPITAL ENCOUNTER (EMERGENCY)
Facility: HOSPITAL | Age: 52
Discharge: HOME | End: 2017-12-01
Attending: EMERGENCY MEDICINE
Payer: COMMERCIAL

## 2017-12-01 VITALS
SYSTOLIC BLOOD PRESSURE: 136 MMHG | HEART RATE: 78 BPM | TEMPERATURE: 99 F | WEIGHT: 125 LBS | RESPIRATION RATE: 15 BRPM | HEIGHT: 67 IN | OXYGEN SATURATION: 99 % | BODY MASS INDEX: 19.62 KG/M2 | DIASTOLIC BLOOD PRESSURE: 78 MMHG

## 2017-12-01 DIAGNOSIS — R06.02 SHORTNESS OF BREATH: ICD-10-CM

## 2017-12-01 DIAGNOSIS — J06.9 UPPER RESPIRATORY TRACT INFECTION, UNSPECIFIED TYPE: ICD-10-CM

## 2017-12-01 DIAGNOSIS — T78.40XA ALLERGIC REACTION, INITIAL ENCOUNTER: Primary | ICD-10-CM

## 2017-12-01 LAB
ALBUMIN SERPL BCP-MCNC: 4.3 G/DL
ALP SERPL-CCNC: 67 U/L
ALT SERPL W/O P-5'-P-CCNC: 11 U/L
ANION GAP SERPL CALC-SCNC: 7 MMOL/L
AST SERPL-CCNC: 19 U/L
BASOPHILS # BLD AUTO: 0.02 K/UL
BASOPHILS NFR BLD: 0.2 %
BILIRUB SERPL-MCNC: 1.2 MG/DL
BNP SERPL-MCNC: 87 PG/ML
BUN SERPL-MCNC: 9 MG/DL
CALCIUM SERPL-MCNC: 10 MG/DL
CHLORIDE SERPL-SCNC: 102 MMOL/L
CO2 SERPL-SCNC: 28 MMOL/L
CREAT SERPL-MCNC: 0.9 MG/DL
DIFFERENTIAL METHOD: ABNORMAL
EOSINOPHIL # BLD AUTO: 0.1 K/UL
EOSINOPHIL NFR BLD: 0.9 %
ERYTHROCYTE [DISTWIDTH] IN BLOOD BY AUTOMATED COUNT: 12.2 %
EST. GFR  (AFRICAN AMERICAN): >60 ML/MIN/1.73 M^2
EST. GFR  (NON AFRICAN AMERICAN): >60 ML/MIN/1.73 M^2
GLUCOSE SERPL-MCNC: 87 MG/DL
HCT VFR BLD AUTO: 41.2 %
HGB BLD-MCNC: 14.3 G/DL
IMM GRANULOCYTES # BLD AUTO: 0.05 K/UL
IMM GRANULOCYTES NFR BLD AUTO: 0.6 %
LYMPHOCYTES # BLD AUTO: 1.2 K/UL
LYMPHOCYTES NFR BLD: 12.8 %
MCH RBC QN AUTO: 32.7 PG
MCHC RBC AUTO-ENTMCNC: 34.7 G/DL
MCV RBC AUTO: 94 FL
MONOCYTES # BLD AUTO: 0.7 K/UL
MONOCYTES NFR BLD: 7.5 %
NEUTROPHILS # BLD AUTO: 7 K/UL
NEUTROPHILS NFR BLD: 78 %
NRBC BLD-RTO: 0 /100 WBC
PLATELET # BLD AUTO: 201 K/UL
PMV BLD AUTO: 9 FL
POTASSIUM SERPL-SCNC: 4 MMOL/L
PROT SERPL-MCNC: 8.2 G/DL
RBC # BLD AUTO: 4.37 M/UL
SODIUM SERPL-SCNC: 137 MMOL/L
WBC # BLD AUTO: 8.97 K/UL

## 2017-12-01 PROCEDURE — 93010 ELECTROCARDIOGRAM REPORT: CPT | Mod: ,,, | Performed by: INTERNAL MEDICINE

## 2017-12-01 PROCEDURE — 99285 EMERGENCY DEPT VISIT HI MDM: CPT | Mod: ,,, | Performed by: EMERGENCY MEDICINE

## 2017-12-01 PROCEDURE — S0028 INJECTION, FAMOTIDINE, 20 MG: HCPCS | Performed by: PHYSICIAN ASSISTANT

## 2017-12-01 PROCEDURE — 99284 EMERGENCY DEPT VISIT MOD MDM: CPT | Mod: 25

## 2017-12-01 PROCEDURE — 63600175 PHARM REV CODE 636 W HCPCS: Performed by: PHYSICIAN ASSISTANT

## 2017-12-01 PROCEDURE — 96374 THER/PROPH/DIAG INJ IV PUSH: CPT

## 2017-12-01 PROCEDURE — 93005 ELECTROCARDIOGRAM TRACING: CPT

## 2017-12-01 PROCEDURE — 96375 TX/PRO/DX INJ NEW DRUG ADDON: CPT

## 2017-12-01 PROCEDURE — 83880 ASSAY OF NATRIURETIC PEPTIDE: CPT

## 2017-12-01 PROCEDURE — 80053 COMPREHEN METABOLIC PANEL: CPT

## 2017-12-01 PROCEDURE — 96361 HYDRATE IV INFUSION ADD-ON: CPT

## 2017-12-01 PROCEDURE — 96372 THER/PROPH/DIAG INJ SC/IM: CPT

## 2017-12-01 PROCEDURE — 85025 COMPLETE CBC W/AUTO DIFF WBC: CPT

## 2017-12-01 PROCEDURE — 25000003 PHARM REV CODE 250: Performed by: PHYSICIAN ASSISTANT

## 2017-12-01 RX ORDER — FAMOTIDINE 10 MG/ML
20 INJECTION INTRAVENOUS
Status: COMPLETED | OUTPATIENT
Start: 2017-12-01 | End: 2017-12-01

## 2017-12-01 RX ORDER — TRIAMCINOLONE ACETONIDE 40 MG/ML
40 INJECTION, SUSPENSION INTRA-ARTICULAR; INTRAMUSCULAR
Status: COMPLETED | OUTPATIENT
Start: 2017-12-01 | End: 2017-12-01

## 2017-12-01 RX ORDER — DIPHENHYDRAMINE HYDROCHLORIDE 50 MG/ML
25 INJECTION INTRAMUSCULAR; INTRAVENOUS
Status: COMPLETED | OUTPATIENT
Start: 2017-12-01 | End: 2017-12-01

## 2017-12-01 RX ORDER — METHYLPREDNISOLONE SOD SUCC 125 MG
125 VIAL (EA) INJECTION
Status: COMPLETED | OUTPATIENT
Start: 2017-12-01 | End: 2017-12-01

## 2017-12-01 RX ADMIN — TRIAMCINOLONE ACETONIDE 40 MG: 40 INJECTION, SUSPENSION INTRA-ARTICULAR; INTRAMUSCULAR at 02:12

## 2017-12-01 RX ADMIN — FAMOTIDINE 20 MG: 10 INJECTION INTRAVENOUS at 12:12

## 2017-12-01 RX ADMIN — METHYLPREDNISOLONE SODIUM SUCCINATE 125 MG: 125 INJECTION, POWDER, FOR SOLUTION INTRAMUSCULAR; INTRAVENOUS at 12:12

## 2017-12-01 RX ADMIN — DIPHENHYDRAMINE HYDROCHLORIDE 25 MG: 50 INJECTION, SOLUTION INTRAMUSCULAR; INTRAVENOUS at 12:12

## 2017-12-01 RX ADMIN — SODIUM CHLORIDE, PRESERVATIVE FREE 1000 ML: 5 INJECTION INTRAVENOUS at 12:12

## 2017-12-01 NOTE — ED TRIAGE NOTES
Presents to ER with head and chest congestion since 11/28/17.  States that she has tried Amoxil which made her throat feel like it was closing.  Then she tried a Zpack that also made her throat feel like it was closing and broke out in hives.  Saw here PCP yesterday and was prescribed proair, Zyrtec and Flonase.  She was instructed to not take any ABX.    GENERAL: The patient is well-developed and well-nourished in no apparent distress. Alert and oriented x4.                                                HEENT: Head is normocephalic and atraumatic. Extraocular muscles are intact. Pupils are equal, round, and reactive to light and accommodation. Nares appeared normal. Mouth is well hydrated and without lesions. Mucous membranes are moist. Posterior pharynx clear of any exudate or lesions.    NECK: Supple. No carotid bruits. No lymphadenopathy or thyromegaly.    LUNGS: Clear to auscultation.    HEART: Regular rate and rhythm without murmur.     ABDOMEN: Soft, nontender, and nondistended. Positive bowel sounds. No hepatosplenomegaly was noted.     EXTREMITIES: Without any cyanosis, clubbing, rash, lesions or edema.     NEUROLOGIC: Cranial nerves II through XII are grossly intact.     PSYCHIATRIC: Flat affect, but denies suicidal or homicidal ideations.    SKIN: No ulceration or induration present.

## 2017-12-01 NOTE — ED PROVIDER NOTES
Encounter Date: 12/1/2017    SCRIBE #1 NOTE: I, Genevieve Funes, am scribing for, and in the presence of,  Dr. Yoon. I have scribed the following portions of the note - the EKG reading and the APC attestation. Other sections scribed: CXR.       History     Chief Complaint   Patient presents with    Nasal Congestion     pt presented to the ED c/o URI x 2 weeks. Pt states she was treated with 2 different antibotics and she had a recation to both medications. Pt alert. Pt's airway patent.     Allergic Reaction     This is a 52 year old female with a PMH of MS, rheumatic fever/endocarditis, asthma, GERD, hypothyroid who presents to the ED with a chief complaint of chest tightness. She reports a 1 week history of URI symptoms. Was seen in urgent care and treated with amoxicillin and IM steroids; she developed an allergic reaction described as urticarial rash, throat swelling and chest tightness. She stopped amoxicillin and started zithromax with similar reaction. Patient saw her PCP yesterday, was treated with albuterol with improvement. Patient endorses chest tightness and shortness of breath which is exacerbated while lying flat. She has a cough productive of green sputum. She is awaiting a pulmonology appointment, which was scheduled prior to the onset of current illness for abnormal CXR findings.    Patient presents today with persistent shortness of breath. She has some facial swelling and ongoing throat tightness. She is concerned regarding the medication reactions and lack of improvement. She endorses subjective fevers (tmax 99 F), chills, and generalized body aches. Has prior hx of allergic reaction, is prescribed epi-pen. She denies headache, neck pain/stiffness, nausea/vomiting, abdominal pain, urinary complaints, diarrhea.           Review of patient's allergies indicates:   Allergen Reactions    Amoxicillin Shortness Of Breath    Insect venom Anaphylaxis     Ant    Bee sting [allergen ext-venom-honey bee]      Codeine     Zithromax [azithromycin] Hives    Prednisone      psychosis     Past Medical History:   Diagnosis Date    Asthma, currently dormant 5/13/2013    Constipation, chronic 5/16/2014    IBS - D    Endocarditis of native valve 9/12/2012    GERD (gastroesophageal reflux disease) 9/19/2014    Hypothyroidism 9/12/2012    Mild mitral and aortic regurgitation 5/13/2013    Echo 9/12    MS (multiple sclerosis) 9/12/2012    Normal cardiac stress test 6/12/2014    SE 6/14    Vision loss, left eye 9/19/2014    Dr. Jensen     Past Surgical History:   Procedure Laterality Date    APPENDECTOMY      BREAST SURGERY      CHOLECYSTECTOMY       Family History   Problem Relation Age of Onset    Diabetes Mother     Cancer Mother 35     colon    Deep vein thrombosis Mother      during pregnancy    Multiple myeloma Mother     Stroke Father      Social History   Substance Use Topics    Smoking status: Never Smoker    Smokeless tobacco: Never Used    Alcohol use 4.2 oz/week     7 Glasses of wine per week      Comment: champagne only/ occasional     Review of Systems   Constitutional: Positive for chills, fatigue and fever. Negative for appetite change.   HENT: Positive for congestion, sore throat and trouble swallowing.    Respiratory: Positive for cough and shortness of breath.    Cardiovascular: Negative for chest pain.   Gastrointestinal: Negative for abdominal pain, nausea and vomiting.   Endocrine: Negative for polyuria.   Genitourinary: Negative for dysuria.   Musculoskeletal: Positive for myalgias. Negative for back pain.   Skin: Negative for rash.   Neurological: Negative for weakness and headaches.   Hematological: Does not bruise/bleed easily.       Physical Exam     Initial Vitals [12/01/17 1030]   BP Pulse Resp Temp SpO2   136/78 78 15 98.9 °F (37.2 °C) 99 %      MAP       97.33         Physical Exam    Constitutional: She appears well-developed and well-nourished.   HENT:   Head: Atraumatic.    Mouth/Throat: Uvula is midline, oropharynx is clear and moist and mucous membranes are normal. No trismus in the jaw. No uvula swelling.   Lip swelling, mild facial swelling and erythema.  No posterior oropharyngeal swelling, airway is patent.    Eyes: Conjunctivae and EOM are normal. Pupils are equal, round, and reactive to light.   Cardiovascular: Normal rate, regular rhythm and normal heart sounds.   No peripheral edema.   Pulmonary/Chest: Breath sounds normal. No respiratory distress. She has no wheezes. She has no rhonchi. She has no rales.   Coarse expiratory breath sounds, Right upper and mid lobe.   Abdominal: Soft. Bowel sounds are normal. There is no tenderness.   Neurological: She is alert and oriented to person, place, and time.   Skin: Skin is warm and dry. No rash noted.         ED Course   Procedures  Labs Reviewed   CBC W/ AUTO DIFFERENTIAL - Abnormal; Notable for the following:        Result Value    MCH 32.7 (*)     MPV 9.0 (*)     Immature Granulocytes 0.6 (*)     Immature Grans (Abs) 0.05 (*)     Gran% 78.0 (*)     Lymph% 12.8 (*)     All other components within normal limits   COMPREHENSIVE METABOLIC PANEL - Abnormal; Notable for the following:     Total Bilirubin 1.2 (*)     Anion Gap 7 (*)     All other components within normal limits   B-TYPE NATRIURETIC PEPTIDE     Imaging Results          X-Ray Chest PA And Lateral (Final result)  Result time 12/01/17 12:24:12    Final result by Dave Santos MD (12/01/17 12:24:12)                 Impression:      No acute chest disease identified.      Electronically signed by: DAVE SANTOS MD  Date:     12/01/17  Time:    12:24              Narrative:    Comparison is made to prior examination dated 2/29/16.    PA and lateral radiographs of the chest were obtained.  The heart is not enlarged.  Superior mediastinal structures are unremarkable.  Pulmonary vasculature is within normal limits.  The lungs are well aerated and free of focal  consolidations.  There is no evidence for pneumothorax or pleural effusions.  Bony structures appear intact.                              EKG Readings: (Independently Interpreted)   NSR. Normal axis. Normal ST segments at 72 BPM.       X-Rays:   Independently Interpreted Readings:   Chest X-Ray: No evidence of acute injury.     Medical Decision Making:   History:   Old Medical Records: I decided to obtain old medical records.  Independently Interpreted Test(s):   I have ordered and independently interpreted X-rays - see prior notes.  I have ordered and independently interpreted EKG Reading(s) - see prior notes  Clinical Tests:   Lab Tests: Ordered and Reviewed  Radiological Study: Ordered and Reviewed  Medical Tests: Ordered and Reviewed       APC / Resident Notes:   52 year old female presents with URI symptoms and medication reaction.  On exam she is afebrile and nontoxic. There is mild lip swelling, facial swelling. Airway is patent. Neck is supple without meningismus. Lungs are clear on the left, coarse expiratory sounds on the right. No peripheral edema.    DDX Includes but is not limited to viral syndrome, bronchitis, pneumonia, allergic reaction, angioedema.    Treat with solumedrol, pepcid, benadryl and IV fluids.    1:03 PM Reassessed patient, she reports feeling significantly improved.  Labs are stable, CXR benign. Facial swelling has diminished, O2 sats are in normal range.    Recommend supportive care for treatment of viral URI.  Antihistamines prn for allergic reaction. Referral to allergy/immunology for further outpatient workup. Return to ED precautions given. Discussed reasons to call 911 for EMS activation including but not limited to recurrence of facial swelling or dyspnea at home. Patient and family verbalized understanding and agree with course of treatment. She is stable for discharge. I discussed the care of this patient with my supervising MD.        Brittany Attestation:   Almaibe #1: I  performed the above scribed service and the documentation accurately describes the services I performed. I attest to the accuracy of the note.    Attending Attestation:     Physician Attestation Statement for NP/PA:   I have conducted a face to face encounter with this patient in addition to the NP/PA, due to Medical Complexity    Other NP/PA Attestation Additions:    History of Present Illness: 52 y.o. female who presents with allergic reaction to outpatient ABX.         Physician Attestation for Scribe:      Comments: I, Dr. Olu Yoon, personally performed the services described in this documentation. All medical record entries made by the scribe were at my direction and in my presence.  I have reviewed the chart and agree that the record reflects my personal performance and is accurate and complete. Olu Yono MD.  1:36 PM 12/01/2017              ED Course      Clinical Impression:   The primary encounter diagnosis was Allergic reaction, initial encounter. Diagnoses of Shortness of breath and Upper respiratory tract infection, unspecified type were also pertinent to this visit.    Disposition:   Disposition: Discharged  Condition: Stable                        Greer Mathur PA-C  12/01/17 1548

## 2017-12-07 DIAGNOSIS — Z12.31 VISIT FOR SCREENING MAMMOGRAM: Primary | ICD-10-CM

## 2018-01-29 RX ORDER — ALPRAZOLAM 1 MG/1
TABLET ORAL
Qty: 30 TABLET | Refills: 0 | Status: SHIPPED | OUTPATIENT
Start: 2018-01-29 | End: 2018-03-09 | Stop reason: SDUPTHER

## 2018-01-29 NOTE — TELEPHONE ENCOUNTER
Please send all refill requests electronically through my new [employer] ID number (SPI): [id number]. Thanks.

## 2018-03-09 ENCOUNTER — OFFICE VISIT (OUTPATIENT)
Dept: INTERNAL MEDICINE | Facility: CLINIC | Age: 53
End: 2018-03-09
Attending: INTERNAL MEDICINE
Payer: COMMERCIAL

## 2018-03-09 VITALS
SYSTOLIC BLOOD PRESSURE: 110 MMHG | OXYGEN SATURATION: 99 % | DIASTOLIC BLOOD PRESSURE: 70 MMHG | HEIGHT: 67 IN | WEIGHT: 127 LBS | HEART RATE: 59 BPM | BODY MASS INDEX: 19.93 KG/M2

## 2018-03-09 DIAGNOSIS — Z13.39 SCREENING FOR ALCOHOLISM: ICD-10-CM

## 2018-03-09 DIAGNOSIS — Q23.1 BICUSPID AORTIC VALVE: Primary | ICD-10-CM

## 2018-03-09 DIAGNOSIS — G35 MS (MULTIPLE SCLEROSIS): Chronic | ICD-10-CM

## 2018-03-09 DIAGNOSIS — Z12.31 VISIT FOR SCREENING MAMMOGRAM: ICD-10-CM

## 2018-03-09 DIAGNOSIS — I08.0 MILD MITRAL AND AORTIC REGURGITATION: ICD-10-CM

## 2018-03-09 DIAGNOSIS — J45.909 ASTHMA, CURRENTLY DORMANT: ICD-10-CM

## 2018-03-09 DIAGNOSIS — Z13.31 SCREENING FOR DEPRESSION: ICD-10-CM

## 2018-03-09 DIAGNOSIS — Z00.00 ROUTINE ADULT HEALTH MAINTENANCE: ICD-10-CM

## 2018-03-09 DIAGNOSIS — N92.1 MENORRHAGIA WITH IRREGULAR CYCLE: ICD-10-CM

## 2018-03-09 PROCEDURE — 99396 PREV VISIT EST AGE 40-64: CPT | Mod: S$GLB,,, | Performed by: INTERNAL MEDICINE

## 2018-03-09 PROCEDURE — G0442 ANNUAL ALCOHOL SCREEN 15 MIN: HCPCS | Mod: S$GLB,,, | Performed by: INTERNAL MEDICINE

## 2018-03-09 PROCEDURE — G0444 DEPRESSION SCREEN ANNUAL: HCPCS | Mod: S$GLB,,, | Performed by: INTERNAL MEDICINE

## 2018-03-09 RX ORDER — PANTOPRAZOLE SODIUM 40 MG/1
40 TABLET, DELAYED RELEASE ORAL DAILY
Qty: 30 TABLET | Refills: 5 | Status: SHIPPED | OUTPATIENT
Start: 2018-03-09 | End: 2018-08-15

## 2018-03-09 RX ORDER — ALPRAZOLAM 1 MG/1
TABLET ORAL
Qty: 30 TABLET | Refills: 2 | Status: SHIPPED | OUTPATIENT
Start: 2018-03-09 | End: 2018-07-10 | Stop reason: SDUPTHER

## 2018-03-09 RX ORDER — EPINEPHRINE 0.3 MG/.3ML
1 INJECTION SUBCUTANEOUS ONCE
Qty: 2 DEVICE | Refills: 5 | Status: SHIPPED | OUTPATIENT
Start: 2018-03-09 | End: 2020-04-22 | Stop reason: SDUPTHER

## 2018-03-09 NOTE — PROGRESS NOTES
Subjective:       Patient ID: Hailey Villalta is a 52 y.o. female.    Chief Complaint: Annual Exam (refill meds, needs printed scripts ); GI Problem (acid reflux); and Menstrual Problem (has had cycle for 2 months, seeing GYN, has faxed BW over )    Menstrual period. Heavy then light for 2 weeks.      Review of Systems   Constitutional: Negative.    Respiratory: Negative for shortness of breath.    Cardiovascular: Negative for chest pain.   Gastrointestinal: Positive for abdominal distention.   Genitourinary: Positive for menstrual problem.   Psychiatric/Behavioral: Negative for dysphoric mood.       Objective:      Physical Exam   Constitutional: She appears well-developed and well-nourished.   HENT:   Head: Normocephalic.   Eyes: Pupils are equal, round, and reactive to light.   Cardiovascular: Normal rate and regular rhythm.  Exam reveals no friction rub.    Murmur heard.   Systolic murmur is present with a grade of 2/6   Pulmonary/Chest: Effort normal.   Mild exp wheeze.   Neurological: She is alert.       Assessment:       1. Bicuspid aortic valve    2. Asthma, currently dormant    3. Mild mitral and aortic regurgitation    4. MS (multiple sclerosis)    5. Visit for screening mammogram    6. Menorrhagia with irregular cycle        Plan:       Per orders and D/C instructions.  Continue meds/diet for Asthma and MS, which are stable.  Get Echo for MVR and Bicuspid Aortic valve.  Prob will get DIC for menorrhagia.    Screening: The patient was screened for depression with the PHQ2 questionnaire and possible health consequences were discussed with the patient, who understands (15 minutes spent). The patient was screened for the misuse of alcohol, by asking the number of drinks per average week, and if pt has had more than 4 drinks (more than 3 for women and elderly) in 1 day within the past year. The health and legal consequences of misuse were discussed (15 minutes spent). The patient was screened for  obesity (BMI>30), If the current BMI > 30, then the possible consequences of obesity, as well as the benefits of diet, exercise, and weight loss were discussed (15 minutes spent).

## 2018-06-05 ENCOUNTER — OFFICE VISIT (OUTPATIENT)
Dept: INTERNAL MEDICINE | Facility: CLINIC | Age: 53
End: 2018-06-05
Attending: INTERNAL MEDICINE
Payer: COMMERCIAL

## 2018-06-05 VITALS
BODY MASS INDEX: 20.09 KG/M2 | OXYGEN SATURATION: 99 % | SYSTOLIC BLOOD PRESSURE: 122 MMHG | TEMPERATURE: 99 F | DIASTOLIC BLOOD PRESSURE: 72 MMHG | HEART RATE: 58 BPM | WEIGHT: 128 LBS | HEIGHT: 67 IN

## 2018-06-05 DIAGNOSIS — R05.9 COUGH: Primary | ICD-10-CM

## 2018-06-05 DIAGNOSIS — R09.81 NASAL CONGESTION: ICD-10-CM

## 2018-06-05 DIAGNOSIS — R50.9 FEVER, UNSPECIFIED FEVER CAUSE: ICD-10-CM

## 2018-06-05 PROCEDURE — 99213 OFFICE O/P EST LOW 20 MIN: CPT | Mod: 25,S$GLB,, | Performed by: INTERNAL MEDICINE

## 2018-06-05 PROCEDURE — 96372 THER/PROPH/DIAG INJ SC/IM: CPT | Mod: S$GLB,,, | Performed by: INTERNAL MEDICINE

## 2018-06-05 PROCEDURE — 3008F BODY MASS INDEX DOCD: CPT | Mod: CPTII,S$GLB,, | Performed by: INTERNAL MEDICINE

## 2018-06-05 RX ORDER — CEFTRIAXONE 1 G/1
1 INJECTION, POWDER, FOR SOLUTION INTRAMUSCULAR; INTRAVENOUS
Status: COMPLETED | OUTPATIENT
Start: 2018-06-05 | End: 2018-06-05

## 2018-06-05 RX ORDER — PROMETHAZINE HYDROCHLORIDE 6.25 MG/5ML
6.25 SYRUP ORAL NIGHTLY PRN
Qty: 240 ML | Refills: 0 | Status: SHIPPED | OUTPATIENT
Start: 2018-06-05 | End: 2018-08-15

## 2018-06-05 RX ORDER — ALBUTEROL SULFATE 90 UG/1
2 AEROSOL, METERED RESPIRATORY (INHALATION) EVERY 6 HOURS PRN
Qty: 18 G | Refills: 1 | Status: SHIPPED | OUTPATIENT
Start: 2018-06-05 | End: 2019-05-24 | Stop reason: SDUPTHER

## 2018-06-05 RX ADMIN — CEFTRIAXONE 1 G: 1 INJECTION, POWDER, FOR SOLUTION INTRAMUSCULAR; INTRAVENOUS at 02:06

## 2018-06-08 NOTE — PROGRESS NOTES
Subjective:       Patient ID: Hailey Sullivan is a 52 y.o. female.    Chief Complaint: Cough (seen at  Saturday  (steroid and antbiotic injection) / green mucus); Chest Congestion; Nasal Congestion; Generalized Body Aches; Fever; and Headache    Green cough for 5 days. Went to  and got a steroid shot.      Cough   This is a new problem. The current episode started in the past 7 days. The problem has been unchanged. The cough is productive of purulent sputum. Associated symptoms include a fever and headaches. Pertinent negatives include no chest pain or shortness of breath.   Fever    Associated symptoms include coughing and headaches. Pertinent negatives include no chest pain.   Headache    Associated symptoms include coughing and a fever.     Review of Systems   Constitutional: Positive for fever.   Respiratory: Positive for cough. Negative for shortness of breath.    Cardiovascular: Negative for chest pain.   Neurological: Positive for headaches.       Objective:      Physical Exam   Constitutional: She appears well-developed and well-nourished.   HENT:   Head: Normocephalic.   Eyes: Pupils are equal, round, and reactive to light.   Cardiovascular: Normal rate and regular rhythm.  Exam reveals no friction rub.    Murmur heard.   Systolic murmur is present with a grade of 2/6   Pulmonary/Chest: Effort normal. She has rhonchi.   Mild exp wheeze.   Neurological: She is alert.       Assessment:       1. Cough    2. Nasal congestion    3. Wheezes        Plan:       Per orders and D/C instructions.  Rocephin IM, Phen syrup, and Albuterol for cough

## 2018-06-27 ENCOUNTER — CLINICAL SUPPORT (OUTPATIENT)
Dept: INTERNAL MEDICINE | Facility: CLINIC | Age: 53
End: 2018-06-27
Payer: COMMERCIAL

## 2018-06-27 DIAGNOSIS — J06.9 UPPER RESPIRATORY TRACT INFECTION, UNSPECIFIED TYPE: Primary | ICD-10-CM

## 2018-06-27 PROCEDURE — 96372 THER/PROPH/DIAG INJ SC/IM: CPT | Mod: S$GLB,,, | Performed by: INTERNAL MEDICINE

## 2018-06-27 RX ORDER — CEFTRIAXONE 1 G/1
1 INJECTION, POWDER, FOR SOLUTION INTRAMUSCULAR; INTRAVENOUS
Status: COMPLETED | OUTPATIENT
Start: 2018-06-27 | End: 2018-06-27

## 2018-06-27 RX ORDER — METHYLPREDNISOLONE ACETATE 80 MG/ML
80 INJECTION, SUSPENSION INTRA-ARTICULAR; INTRALESIONAL; INTRAMUSCULAR; SOFT TISSUE
Status: COMPLETED | OUTPATIENT
Start: 2018-06-27 | End: 2018-06-27

## 2018-06-27 RX ORDER — TRIAMCINOLONE ACETONIDE 40 MG/ML
40 INJECTION, SUSPENSION INTRA-ARTICULAR; INTRAMUSCULAR
Status: COMPLETED | OUTPATIENT
Start: 2018-06-27 | End: 2018-06-27

## 2018-06-27 RX ADMIN — CEFTRIAXONE 1 G: 1 INJECTION, POWDER, FOR SOLUTION INTRAMUSCULAR; INTRAVENOUS at 09:06

## 2018-06-27 RX ADMIN — TRIAMCINOLONE ACETONIDE 40 MG: 40 INJECTION, SUSPENSION INTRA-ARTICULAR; INTRAMUSCULAR at 09:06

## 2018-06-27 RX ADMIN — METHYLPREDNISOLONE ACETATE 80 MG: 80 INJECTION, SUSPENSION INTRA-ARTICULAR; INTRALESIONAL; INTRAMUSCULAR; SOFT TISSUE at 09:06

## 2018-07-10 DIAGNOSIS — F41.9 ANXIETY: Primary | ICD-10-CM

## 2018-07-10 RX ORDER — ALPRAZOLAM 1 MG/1
TABLET ORAL
Qty: 30 TABLET | Refills: 2 | Status: SHIPPED | OUTPATIENT
Start: 2018-07-10 | End: 2018-10-19 | Stop reason: SDUPTHER

## 2018-08-15 ENCOUNTER — LAB VISIT (OUTPATIENT)
Dept: LAB | Facility: OTHER | Age: 53
End: 2018-08-15
Attending: INTERNAL MEDICINE
Payer: COMMERCIAL

## 2018-08-15 ENCOUNTER — OFFICE VISIT (OUTPATIENT)
Dept: INTERNAL MEDICINE | Facility: CLINIC | Age: 53
End: 2018-08-15
Attending: INTERNAL MEDICINE
Payer: COMMERCIAL

## 2018-08-15 VITALS
DIASTOLIC BLOOD PRESSURE: 80 MMHG | WEIGHT: 120 LBS | HEIGHT: 67 IN | OXYGEN SATURATION: 98 % | SYSTOLIC BLOOD PRESSURE: 120 MMHG | BODY MASS INDEX: 18.83 KG/M2 | HEART RATE: 63 BPM

## 2018-08-15 DIAGNOSIS — D50.8 OTHER IRON DEFICIENCY ANEMIA: ICD-10-CM

## 2018-08-15 DIAGNOSIS — Z00.00 ROUTINE ADULT HEALTH MAINTENANCE: Primary | ICD-10-CM

## 2018-08-15 DIAGNOSIS — R79.89 OTHER SPECIFIED ABNORMAL FINDINGS OF BLOOD CHEMISTRY: ICD-10-CM

## 2018-08-15 DIAGNOSIS — K59.01 SLOW TRANSIT CONSTIPATION: ICD-10-CM

## 2018-08-15 DIAGNOSIS — D51.0 PERNICIOUS ANEMIA: ICD-10-CM

## 2018-08-15 DIAGNOSIS — R11.2 NAUSEA AND VOMITING, INTRACTABILITY OF VOMITING NOT SPECIFIED, UNSPECIFIED VOMITING TYPE: ICD-10-CM

## 2018-08-15 DIAGNOSIS — R42 DIZZY SPELLS: ICD-10-CM

## 2018-08-15 DIAGNOSIS — E03.9 HYPOTHYROIDISM, UNSPECIFIED TYPE: ICD-10-CM

## 2018-08-15 DIAGNOSIS — E78.9 DISORDER OF LIPID METABOLISM: ICD-10-CM

## 2018-08-15 DIAGNOSIS — Z00.00 ROUTINE ADULT HEALTH MAINTENANCE: ICD-10-CM

## 2018-08-15 DIAGNOSIS — E55.9 VITAMIN D DEFICIENCY: ICD-10-CM

## 2018-08-15 DIAGNOSIS — F43.0 STRESS REACTION: ICD-10-CM

## 2018-08-15 DIAGNOSIS — K29.30 SUPERFICIAL GASTRITIS WITHOUT HEMORRHAGE, UNSPECIFIED CHRONICITY: ICD-10-CM

## 2018-08-15 DIAGNOSIS — Q23.1 BICUSPID AORTIC VALVE: Primary | ICD-10-CM

## 2018-08-15 LAB
ALBUMIN SERPL BCP-MCNC: 4.6 G/DL
ALP SERPL-CCNC: 60 U/L
ALT SERPL W/O P-5'-P-CCNC: 20 U/L
ANION GAP SERPL CALC-SCNC: 10 MMOL/L
AST SERPL-CCNC: 24 U/L
BASOPHILS # BLD AUTO: 0.02 K/UL
BASOPHILS NFR BLD: 0.4 %
BILIRUB SERPL-MCNC: 0.8 MG/DL
BUN SERPL-MCNC: 11 MG/DL
CALCIUM SERPL-MCNC: 9.8 MG/DL
CHLORIDE SERPL-SCNC: 102 MMOL/L
CHOLEST SERPL-MCNC: 179 MG/DL
CHOLEST/HDLC SERPL: 3.4 {RATIO}
CO2 SERPL-SCNC: 28 MMOL/L
CREAT SERPL-MCNC: 0.8 MG/DL
DIFFERENTIAL METHOD: ABNORMAL
EOSINOPHIL # BLD AUTO: 0.2 K/UL
EOSINOPHIL NFR BLD: 3 %
ERYTHROCYTE [DISTWIDTH] IN BLOOD BY AUTOMATED COUNT: 12.2 %
EST. GFR  (AFRICAN AMERICAN): >60 ML/MIN/1.73 M^2
EST. GFR  (NON AFRICAN AMERICAN): >60 ML/MIN/1.73 M^2
ESTIMATED AVG GLUCOSE: 94 MG/DL
ESTRADIOL SERPL-MCNC: <10 PG/ML
FSH SERPL-ACNC: 92.8 MIU/ML
GLUCOSE SERPL-MCNC: 106 MG/DL
HBA1C MFR BLD HPLC: 4.9 %
HCT VFR BLD AUTO: 41.1 %
HDLC SERPL-MCNC: 53 MG/DL
HDLC SERPL: 29.6 %
HGB BLD-MCNC: 13.7 G/DL
LDLC SERPL CALC-MCNC: 97.2 MG/DL
LYMPHOCYTES # BLD AUTO: 1.5 K/UL
LYMPHOCYTES NFR BLD: 25.5 %
MCH RBC QN AUTO: 31.8 PG
MCHC RBC AUTO-ENTMCNC: 33.3 G/DL
MCV RBC AUTO: 95 FL
MONOCYTES # BLD AUTO: 0.3 K/UL
MONOCYTES NFR BLD: 6 %
NEUTROPHILS # BLD AUTO: 3.7 K/UL
NEUTROPHILS NFR BLD: 64.9 %
NONHDLC SERPL-MCNC: 126 MG/DL
PLATELET # BLD AUTO: 181 K/UL
PMV BLD AUTO: 9.2 FL
POTASSIUM SERPL-SCNC: 3.8 MMOL/L
PROT SERPL-MCNC: 7.8 G/DL
RBC # BLD AUTO: 4.31 M/UL
SODIUM SERPL-SCNC: 140 MMOL/L
TRIGL SERPL-MCNC: 144 MG/DL
TSH SERPL DL<=0.005 MIU/L-ACNC: 1.95 UIU/ML
VIT B12 SERPL-MCNC: 466 PG/ML
WBC # BLD AUTO: 5.69 K/UL

## 2018-08-15 PROCEDURE — 80061 LIPID PANEL: CPT

## 2018-08-15 PROCEDURE — 36415 COLL VENOUS BLD VENIPUNCTURE: CPT

## 2018-08-15 PROCEDURE — 99214 OFFICE O/P EST MOD 30 MIN: CPT | Mod: S$GLB,,, | Performed by: INTERNAL MEDICINE

## 2018-08-15 PROCEDURE — 83001 ASSAY OF GONADOTROPIN (FSH): CPT

## 2018-08-15 PROCEDURE — 84443 ASSAY THYROID STIM HORMONE: CPT

## 2018-08-15 PROCEDURE — 82670 ASSAY OF TOTAL ESTRADIOL: CPT

## 2018-08-15 PROCEDURE — 85025 COMPLETE CBC W/AUTO DIFF WBC: CPT

## 2018-08-15 PROCEDURE — 80053 COMPREHEN METABOLIC PANEL: CPT

## 2018-08-15 PROCEDURE — 3008F BODY MASS INDEX DOCD: CPT | Mod: CPTII,S$GLB,, | Performed by: INTERNAL MEDICINE

## 2018-08-15 PROCEDURE — 83036 HEMOGLOBIN GLYCOSYLATED A1C: CPT

## 2018-08-15 PROCEDURE — 82607 VITAMIN B-12: CPT

## 2018-08-15 RX ORDER — DEXLANSOPRAZOLE 60 MG/1
60 CAPSULE, DELAYED RELEASE ORAL DAILY
Qty: 30 CAPSULE | Refills: 11 | Status: SHIPPED | OUTPATIENT
Start: 2018-08-15 | End: 2018-09-04 | Stop reason: SDUPTHER

## 2018-08-15 RX ORDER — ONDANSETRON 8 MG/1
TABLET, ORALLY DISINTEGRATING ORAL
Qty: 10 TABLET | Refills: 5 | Status: SHIPPED | OUTPATIENT
Start: 2018-08-15 | End: 2020-11-06

## 2018-08-15 NOTE — PROGRESS NOTES
Subjective:       Patient ID: Hailey Sullivan is a 52 y.o. female.    Chief Complaint: Gastroesophageal Reflux; Headache (starts back of the head); Chest Pain (left side); Chills; Nausea; and Dizziness    Under stress since mom  1 year ago.  Takes Prevacid for GERD.  Gets episodes of N,v, which last all day.  Poor sleep at night, unless she takes Xanax.      Gastroesophageal Reflux   She complains of chest pain, early satiety, heartburn and nausea. This is a chronic problem. The problem occurs frequently. The problem has been gradually worsening. She has tried an antacid and a PPI for the symptoms. The treatment provided no relief.   Nausea   This is a recurrent problem. The current episode started more than 1 month ago. Associated symptoms include chest pain, headaches, nausea and vomiting.   Dizziness:   Chronicity:  Recurrent  Onset:  More than 1 month ago   Associated symptoms: headaches, nausea, vomiting and chest pain.    Review of Systems   Constitutional: Negative.    Respiratory: Negative for shortness of breath.    Cardiovascular: Positive for chest pain.   Gastrointestinal: Positive for abdominal distention, constipation, heartburn, nausea and vomiting.   Neurological: Positive for dizziness and headaches.       Objective:      Physical Exam   Constitutional: She appears well-developed and well-nourished.   HENT:   Head: Normocephalic.   Eyes: Pupils are equal, round, and reactive to light.   Cardiovascular: Normal rate and regular rhythm. Exam reveals no friction rub.   Murmur heard.   Systolic murmur is present with a grade of 2/6.  Pulmonary/Chest: Effort normal.   Abdominal: Soft. Bowel sounds are normal. There is tenderness in the epigastric area. There is no rebound.   Neurological: She is alert.       Assessment:       1. Bicuspid aortic valve    2. Dizzy spells    3. Superficial gastritis without hemorrhage, unspecified chronicity    4. Nausea and vomiting, intractability of  vomiting not specified, unspecified vomiting type    5. Slow transit constipation    6. Stress reaction        Plan:       Per orders and D/C instructions.  Dexilant for GERD.  Zofran for N, V. Consider trying Donnatal.  Check labs.  Routine Echo for Bicuspid valve.  Xanax at night prn stress and insomnia. She refuses low dose Vybriid at this time.

## 2018-08-16 PROBLEM — Z78.0 MENOPAUSE: Status: ACTIVE | Noted: 2018-08-16

## 2018-08-21 ENCOUNTER — CLINICAL SUPPORT (OUTPATIENT)
Dept: INTERNAL MEDICINE | Facility: CLINIC | Age: 53
End: 2018-08-21
Attending: INTERNAL MEDICINE
Payer: COMMERCIAL

## 2018-08-21 DIAGNOSIS — R09.89 CAROTID BRUIT, UNSPECIFIED LATERALITY: Primary | ICD-10-CM

## 2018-08-21 DIAGNOSIS — Q23.1 BICUSPID AORTIC VALVE: ICD-10-CM

## 2018-08-21 DIAGNOSIS — I00 RHEUMATIC FEVER: ICD-10-CM

## 2018-08-21 DIAGNOSIS — I38 ENDOCARDITIS OF NATIVE VALVE: Chronic | ICD-10-CM

## 2018-08-21 DIAGNOSIS — I08.0 MILD MITRAL AND AORTIC REGURGITATION: ICD-10-CM

## 2018-08-21 PROCEDURE — 93306 TTE W/DOPPLER COMPLETE: CPT | Mod: S$GLB,,, | Performed by: INTERNAL MEDICINE

## 2018-08-21 PROCEDURE — 93880 EXTRACRANIAL BILAT STUDY: CPT | Mod: S$GLB,,, | Performed by: INTERNAL MEDICINE

## 2018-09-04 RX ORDER — DEXLANSOPRAZOLE 60 MG/1
60 CAPSULE, DELAYED RELEASE ORAL DAILY
Qty: 30 CAPSULE | Refills: 11 | Status: SHIPPED | OUTPATIENT
Start: 2018-09-04 | End: 2018-09-05 | Stop reason: CLARIF

## 2018-09-05 RX ORDER — ESOMEPRAZOLE MAGNESIUM 40 MG/1
40 CAPSULE, DELAYED RELEASE ORAL DAILY
Qty: 30 CAPSULE | Refills: 11 | Status: SHIPPED | OUTPATIENT
Start: 2018-09-05 | End: 2019-05-07

## 2018-09-12 ENCOUNTER — HOSPITAL ENCOUNTER (OUTPATIENT)
Dept: RADIOLOGY | Facility: OTHER | Age: 53
Discharge: HOME | End: 2018-09-12
Attending: INTERNAL MEDICINE
Payer: COMMERCIAL

## 2018-09-12 DIAGNOSIS — Z12.31 VISIT FOR SCREENING MAMMOGRAM: ICD-10-CM

## 2018-09-12 DIAGNOSIS — R92.8 ABNORMAL MAMMOGRAM: ICD-10-CM

## 2018-09-12 DIAGNOSIS — R92.8 ABNORMAL MAMMOGRAM: Primary | ICD-10-CM

## 2018-09-12 PROCEDURE — 77067 SCR MAMMO BI INCL CAD: CPT | Mod: 26,,, | Performed by: INTERNAL MEDICINE

## 2018-09-12 PROCEDURE — 77063 BREAST TOMOSYNTHESIS BI: CPT | Mod: 26,,, | Performed by: INTERNAL MEDICINE

## 2018-09-12 PROCEDURE — 77067 SCR MAMMO BI INCL CAD: CPT | Mod: TC

## 2018-09-17 ENCOUNTER — HOSPITAL ENCOUNTER (OUTPATIENT)
Dept: RADIOLOGY | Facility: OTHER | Age: 53
Discharge: HOME | End: 2018-09-17
Attending: INTERNAL MEDICINE
Payer: COMMERCIAL

## 2018-09-17 PROCEDURE — 77065 DX MAMMO INCL CAD UNI: CPT | Mod: 26,LT,, | Performed by: RADIOLOGY

## 2018-09-17 PROCEDURE — 77065 DX MAMMO INCL CAD UNI: CPT | Mod: TC,LT

## 2018-10-05 ENCOUNTER — CLINICAL SUPPORT (OUTPATIENT)
Dept: INTERNAL MEDICINE | Facility: CLINIC | Age: 53
End: 2018-10-05
Payer: COMMERCIAL

## 2018-10-05 DIAGNOSIS — J06.9 UPPER RESPIRATORY TRACT INFECTION, UNSPECIFIED TYPE: Primary | ICD-10-CM

## 2018-10-05 PROCEDURE — 96372 THER/PROPH/DIAG INJ SC/IM: CPT | Mod: S$GLB,,, | Performed by: INTERNAL MEDICINE

## 2018-10-05 RX ORDER — CEFTRIAXONE 1 G/1
1 INJECTION, POWDER, FOR SOLUTION INTRAMUSCULAR; INTRAVENOUS
Status: COMPLETED | OUTPATIENT
Start: 2018-10-05 | End: 2018-10-05

## 2018-10-05 RX ORDER — METHYLPREDNISOLONE ACETATE 80 MG/ML
80 INJECTION, SUSPENSION INTRA-ARTICULAR; INTRALESIONAL; INTRAMUSCULAR; SOFT TISSUE
Status: COMPLETED | OUTPATIENT
Start: 2018-10-05 | End: 2018-10-05

## 2018-10-05 RX ORDER — TRIAMCINOLONE ACETONIDE 40 MG/ML
40 INJECTION, SUSPENSION INTRA-ARTICULAR; INTRAMUSCULAR
Status: COMPLETED | OUTPATIENT
Start: 2018-10-05 | End: 2018-10-05

## 2018-10-05 RX ADMIN — TRIAMCINOLONE ACETONIDE 40 MG: 40 INJECTION, SUSPENSION INTRA-ARTICULAR; INTRAMUSCULAR at 02:10

## 2018-10-05 RX ADMIN — METHYLPREDNISOLONE ACETATE 80 MG: 80 INJECTION, SUSPENSION INTRA-ARTICULAR; INTRALESIONAL; INTRAMUSCULAR; SOFT TISSUE at 02:10

## 2018-10-05 RX ADMIN — CEFTRIAXONE 1 G: 1 INJECTION, POWDER, FOR SOLUTION INTRAMUSCULAR; INTRAVENOUS at 02:10

## 2018-10-11 ENCOUNTER — CLINICAL SUPPORT (OUTPATIENT)
Dept: INTERNAL MEDICINE | Facility: CLINIC | Age: 53
End: 2018-10-11
Payer: COMMERCIAL

## 2018-10-11 DIAGNOSIS — J06.9 UPPER RESPIRATORY TRACT INFECTION, UNSPECIFIED TYPE: Primary | ICD-10-CM

## 2018-10-11 DIAGNOSIS — R05.9 COUGH: ICD-10-CM

## 2018-10-11 DIAGNOSIS — B96.89 BV (BACTERIAL VAGINOSIS): Primary | ICD-10-CM

## 2018-10-11 DIAGNOSIS — N76.0 BV (BACTERIAL VAGINOSIS): Primary | ICD-10-CM

## 2018-10-11 PROCEDURE — 96372 THER/PROPH/DIAG INJ SC/IM: CPT | Mod: S$GLB,,, | Performed by: INTERNAL MEDICINE

## 2018-10-11 RX ORDER — PROMETHAZINE HYDROCHLORIDE AND DEXTROMETHORPHAN HYDROBROMIDE 6.25; 15 MG/5ML; MG/5ML
5 SYRUP ORAL NIGHTLY PRN
Qty: 180 ML | Refills: 0 | Status: SHIPPED | OUTPATIENT
Start: 2018-10-11 | End: 2020-11-06

## 2018-10-11 RX ORDER — TRIAMCINOLONE ACETONIDE 40 MG/ML
40 INJECTION, SUSPENSION INTRA-ARTICULAR; INTRAMUSCULAR
Status: COMPLETED | OUTPATIENT
Start: 2018-10-11 | End: 2018-10-11

## 2018-10-11 RX ORDER — METRONIDAZOLE 7.5 MG/G
1 GEL VAGINAL 2 TIMES DAILY
Qty: 70 G | Refills: 0 | Status: SHIPPED | OUTPATIENT
Start: 2018-10-11 | End: 2020-11-10

## 2018-10-11 RX ORDER — METHYLPREDNISOLONE ACETATE 80 MG/ML
80 INJECTION, SUSPENSION INTRA-ARTICULAR; INTRALESIONAL; INTRAMUSCULAR; SOFT TISSUE
Status: COMPLETED | OUTPATIENT
Start: 2018-10-11 | End: 2018-10-11

## 2018-10-11 RX ORDER — CEFTRIAXONE 1 G/1
1 INJECTION, POWDER, FOR SOLUTION INTRAMUSCULAR; INTRAVENOUS
Status: COMPLETED | OUTPATIENT
Start: 2018-10-11 | End: 2018-10-11

## 2018-10-11 RX ADMIN — TRIAMCINOLONE ACETONIDE 40 MG: 40 INJECTION, SUSPENSION INTRA-ARTICULAR; INTRAMUSCULAR at 12:10

## 2018-10-11 RX ADMIN — CEFTRIAXONE 1 G: 1 INJECTION, POWDER, FOR SOLUTION INTRAMUSCULAR; INTRAVENOUS at 12:10

## 2018-10-11 RX ADMIN — METHYLPREDNISOLONE ACETATE 80 MG: 80 INJECTION, SUSPENSION INTRA-ARTICULAR; INTRALESIONAL; INTRAMUSCULAR; SOFT TISSUE at 12:10

## 2018-10-19 DIAGNOSIS — F41.9 ANXIETY: ICD-10-CM

## 2018-10-19 RX ORDER — ALPRAZOLAM 1 MG/1
TABLET ORAL
Qty: 30 TABLET | Refills: 0 | Status: SHIPPED | OUTPATIENT
Start: 2018-10-19 | End: 2018-11-20 | Stop reason: SDUPTHER

## 2018-11-20 DIAGNOSIS — F41.9 ANXIETY: ICD-10-CM

## 2018-11-20 RX ORDER — ALPRAZOLAM 1 MG/1
TABLET ORAL
Qty: 30 TABLET | Refills: 2 | Status: SHIPPED | OUTPATIENT
Start: 2018-11-20 | End: 2019-02-19 | Stop reason: SDUPTHER

## 2018-12-12 ENCOUNTER — HOSPITAL ENCOUNTER (EMERGENCY)
Facility: HOSPITAL | Age: 53
Discharge: HOME | End: 2018-12-12
Attending: EMERGENCY MEDICINE
Payer: COMMERCIAL

## 2018-12-12 VITALS
SYSTOLIC BLOOD PRESSURE: 130 MMHG | TEMPERATURE: 99 F | BODY MASS INDEX: 19.29 KG/M2 | OXYGEN SATURATION: 99 % | HEIGHT: 66 IN | HEART RATE: 65 BPM | DIASTOLIC BLOOD PRESSURE: 79 MMHG | WEIGHT: 120 LBS | RESPIRATION RATE: 19 BRPM

## 2018-12-12 DIAGNOSIS — I63.9 STROKE: ICD-10-CM

## 2018-12-12 DIAGNOSIS — R07.9 CHEST PAIN: Primary | ICD-10-CM

## 2018-12-12 PROBLEM — R47.9 DIFFICULTY WITH SPEECH: Status: ACTIVE | Noted: 2018-12-12

## 2018-12-12 LAB
ALBUMIN SERPL BCP-MCNC: 4 G/DL
ALP SERPL-CCNC: 58 U/L
ALT SERPL W/O P-5'-P-CCNC: 12 U/L
ANION GAP SERPL CALC-SCNC: 7 MMOL/L
AST SERPL-CCNC: 19 U/L
BASOPHILS # BLD AUTO: 0.03 K/UL
BASOPHILS NFR BLD: 0.3 %
BILIRUB SERPL-MCNC: 0.5 MG/DL
BUN SERPL-MCNC: 11 MG/DL
CALCIUM SERPL-MCNC: 9.5 MG/DL
CHLORIDE SERPL-SCNC: 105 MMOL/L
CHOLEST SERPL-MCNC: 187 MG/DL
CHOLEST/HDLC SERPL: 3.1 {RATIO}
CO2 SERPL-SCNC: 27 MMOL/L
CREAT SERPL-MCNC: 0.7 MG/DL (ref 0.5–1.4)
CREAT SERPL-MCNC: 0.9 MG/DL
DIFFERENTIAL METHOD: ABNORMAL
EOSINOPHIL # BLD AUTO: 0.1 K/UL
EOSINOPHIL NFR BLD: 0.8 %
ERYTHROCYTE [DISTWIDTH] IN BLOOD BY AUTOMATED COUNT: 11.9 %
EST. GFR  (AFRICAN AMERICAN): >60 ML/MIN/1.73 M^2
EST. GFR  (NON AFRICAN AMERICAN): >60 ML/MIN/1.73 M^2
GLUCOSE SERPL-MCNC: 114 MG/DL
HCT VFR BLD AUTO: 39 %
HDLC SERPL-MCNC: 60 MG/DL
HDLC SERPL: 32.1 %
HGB BLD-MCNC: 13 G/DL
IMM GRANULOCYTES # BLD AUTO: 0.03 K/UL
IMM GRANULOCYTES NFR BLD AUTO: 0.3 %
INR PPP: 1
LDLC SERPL CALC-MCNC: 110 MG/DL
LYMPHOCYTES # BLD AUTO: 1.3 K/UL
LYMPHOCYTES NFR BLD: 11.3 %
MCH RBC QN AUTO: 31.8 PG
MCHC RBC AUTO-ENTMCNC: 33.3 G/DL
MCV RBC AUTO: 95 FL
MONOCYTES # BLD AUTO: 0.8 K/UL
MONOCYTES NFR BLD: 7.3 %
NEUTROPHILS # BLD AUTO: 9 K/UL
NEUTROPHILS NFR BLD: 80 %
NONHDLC SERPL-MCNC: 127 MG/DL
NRBC BLD-RTO: 0 /100 WBC
PLATELET # BLD AUTO: 188 K/UL
PMV BLD AUTO: 9 FL
POC PTINR: 1.1 (ref 0.9–1.2)
POC PTWBT: 13.4 SEC (ref 9.7–14.3)
POCT GLUCOSE: 104 MG/DL (ref 70–110)
POTASSIUM SERPL-SCNC: 3.8 MMOL/L
PROT SERPL-MCNC: 7.1 G/DL
PROTHROMBIN TIME: 10.7 SEC
RBC # BLD AUTO: 4.09 M/UL
SAMPLE: NORMAL
SAMPLE: NORMAL
SODIUM SERPL-SCNC: 139 MMOL/L
TRIGL SERPL-MCNC: 85 MG/DL
TROPONIN I SERPL DL<=0.01 NG/ML-MCNC: 0.02 NG/ML
TSH SERPL DL<=0.005 MIU/L-ACNC: 3.61 UIU/ML
WBC # BLD AUTO: 11.22 K/UL

## 2018-12-12 PROCEDURE — 80053 COMPREHEN METABOLIC PANEL: CPT

## 2018-12-12 PROCEDURE — 84484 ASSAY OF TROPONIN QUANT: CPT

## 2018-12-12 PROCEDURE — 93010 ELECTROCARDIOGRAM REPORT: CPT | Mod: 76,,, | Performed by: INTERNAL MEDICINE

## 2018-12-12 PROCEDURE — 93005 ELECTROCARDIOGRAM TRACING: CPT

## 2018-12-12 PROCEDURE — 99285 EMERGENCY DEPT VISIT HI MDM: CPT | Mod: ,,, | Performed by: EMERGENCY MEDICINE

## 2018-12-12 PROCEDURE — 82565 ASSAY OF CREATININE: CPT

## 2018-12-12 PROCEDURE — 82803 BLOOD GASES ANY COMBINATION: CPT

## 2018-12-12 PROCEDURE — 85610 PROTHROMBIN TIME: CPT

## 2018-12-12 PROCEDURE — 25500020 PHARM REV CODE 255: Performed by: EMERGENCY MEDICINE

## 2018-12-12 PROCEDURE — 99285 EMERGENCY DEPT VISIT HI MDM: CPT | Mod: 25

## 2018-12-12 PROCEDURE — 82962 GLUCOSE BLOOD TEST: CPT

## 2018-12-12 PROCEDURE — 80061 LIPID PANEL: CPT

## 2018-12-12 PROCEDURE — 99900035 HC TECH TIME PER 15 MIN (STAT)

## 2018-12-12 PROCEDURE — 85025 COMPLETE CBC W/AUTO DIFF WBC: CPT

## 2018-12-12 PROCEDURE — 84443 ASSAY THYROID STIM HORMONE: CPT

## 2018-12-12 PROCEDURE — 99283 EMERGENCY DEPT VISIT LOW MDM: CPT | Mod: ,,, | Performed by: PSYCHIATRY & NEUROLOGY

## 2018-12-12 RX ORDER — PROCHLORPERAZINE EDISYLATE 5 MG/ML
10 INJECTION INTRAMUSCULAR; INTRAVENOUS ONCE
Status: DISCONTINUED | OUTPATIENT
Start: 2018-12-12 | End: 2018-12-12 | Stop reason: HOSPADM

## 2018-12-12 RX ADMIN — IOHEXOL 100 ML: 350 INJECTION, SOLUTION INTRAVENOUS at 01:12

## 2018-12-12 RX ADMIN — IOHEXOL 50 ML: 350 INJECTION, SOLUTION INTRAVENOUS at 02:12

## 2018-12-12 NOTE — CONSULTS
[facility]  Vascular Neurology  Comprehensive Stroke Center  Consult Note    Inpatient consult to Vascular (Stroke) Neurology  Consult performed by: Bunny Ramírez MD  Consult ordered by: Alvina Moe MD        Assessment/Plan:     Patient is a 53 y.o. year old female with:    * Difficulty with speech    53 yr old with acute onset stomach pain, emesis, left sided headache and speech difficulty last night. LKN 11:30pm. Says she was having trouble getting words out and also slurring her speech. Has had episodes of transient weakness , visual changes in the past with residual deficits. Recent had Bell's palsy on the left side.  No history of speech disturbance in the past. Never been diagnosed with HTN, DM. Not a smoker. Denies being on any medications, except for Neksium.   CTA head and neck showed no acute intracranial hemorrhage or major vascular distribution infarct. No high-grade stenosis or major vessel occlusion.    -- Speech improving from the time she came into the ED.   -- Recommend MRI brain W WO contrast, preferably demyelinating protocol. As she has never been on any Rx for MS, she needs one to assess disease burden. Further recs depending on her MRI.  -- Infectious workup; UA, CXR     Antithrombotics for secondary stroke prevention: Antiplatelets: None: Patient/family refusal and No stroke. History not suggestive of TIA either.  Anticoagulants: N/A    Statins for secondary stroke prevention and hyperlipidemia, if present:   Statins: None: Reason: ASCVD score of 1.3%; Less than 5% risk of cardiovascular event (coronary or stroke death or non-fatal MI or stroke) in the next 10yrs.  LDL- 110    Aggressive risk factor modification: Needs follow up MRI W contrast to assess for demyelinating lesions     Rehab efforts: PT/OT/SLP to evaluate and treat    Diagnostics ordered/pending: HgbA1C to assess blood glucose levels, Other: MRI Brain W WO Contrast Demyelinating  protocol.    VTE prophylaxis: Heparin 5000 units SQ every 8 hours    BP parameters: SBP<140     GERD (gastroesophageal reflux disease)    -- On Neksium  -- S/p EGD on 9/25/17, biopsy showed erosive gastritis, negative for H pylori  -- f/u with GI     Endocarditis of native valve    -- Diagnosed at age 21.   -- Received abx  -- Gets an ECHO every 2 yrs. Follows up with her PCP, Dr Guallpa  -- Last ECHO 21/8/18:             1) Heart pumps well.              2) Aortic valve has a bit more regurg than 2 years ago. Now mild/moderate.              3) Mild MVP with mild MVR.     MS (multiple sclerosis)    -- Diagnosed in [place] 22 yrs ago.  -- Never been on any rx for it.   -- MRI brain W WO contrast demyelinating protocol.         STROKE DOCUMENTATION     Acute Stroke Times   Last Known Normal Date: 12/11/18  Last Known Normal Time: 2330  Symptom Onset Date: 12/11/18  Symptom Onset Time: 2330  Stroke Team Called Date: 12/12/18  Stroke Team Called Time: 1302  Stroke Team Arrival Date: 12/12/18  Stroke Team Arrival Time: 1310  CT Interpretation Time: 0140    NIH Scale:  Interval: baseline  1a. Level of Consciousness: 0-->Alert, keenly responsive  1b. LOC Questions: 0-->Answers both questions correctly  1c. LOC Commands: 0-->Performs both tasks correctly  2. Best Gaze: 0-->Normal  3. Visual: 0-->No visual loss  4. Facial Palsy: 1-->Minor paralysis (flattened nasolabial fold, asymmetry on smiling)  5a. Motor Arm, Left: 0-->No drift, limb holds 90 (or 45) degrees for full 10 secs  5b. Motor Arm, Right: 0-->No drift, limb holds 90 (or 45) degrees for full 10 secs  6a. Motor Leg, Left: 0-->No drift, leg holds 30 degree position for full 5 secs  6b. Motor Leg, Right: 0-->No drift, leg holds 30 degree position for full 5 secs  7. Limb Ataxia: 1-->Present in one limb  8. Sensory: 0-->Normal, no sensory loss  9. Best Language: 0-->No aphasia, normal  10. Dysarthria: 1-->Mild-to-moderate dysarthria, patient slurs at least some words  and, at worst, can be understood with some difficulty  11. Extinction and Inattention (formerly Neglect): 0-->No abnormality  Total (NIH Stroke Scale): 3    Modified Hansford Score: 1  Pauline Coma Scale:15   ABCD2 Score:    [id number] Score:   HAS -BLED Score:   ICH Score:   Hunt & Patten Classification:       Thrombolysis Candidate? No, Strong suspicion for stroke mimic or alternative diagnosis       Interventional Revascularization Candidate?   Is the patient eligible for mechanical endovascular reperfusion (TONA)?  No; No large vessel occlusion and No; No ischemic penumbra      Hemorrhagic change of an Ischemic Stroke: Does this patient have an ischemic stroke with hemorrhagic changes? No     Subjective:     History of Present Illness:  53 yr old female with PMH of MS not on any rx, GERD, endocarditis of the native valve, hypothyroidism, who presented to the ED with acute onset dizziness, emesis, left sided headache and speech difficulty. LKN was 11pm on 12/11. Patient says that she started experiencing stomach pain which progressed to chest pain and a left sided headache. Had difficulty getting words out; per the , she kept repeating the words 'mind' and 'help'. Says that she was having trouble getting words out but was able to understand him but also says that she was a bit confused. Also noticed that her speech was slurred. Had 2 episodes of emesis. States that she had an episode of Bell's Palsy a month ago after which she developed a left sided facial droop. Denies any acute visual changes although she does says that she's had blurred vision on and off in the left eye for the past couple of years.  She denies any dysphagia/focal weakness/numbness.  Diagnosed with MS 22 yrs ago in [place]. No recent imaging. Has not been on any medication for it as she does not like taking meds. Has had episodes of transient weakness, incoordination in the past and some residual deficits from it but did not experience any  exacerbation of those deficits during her current episode. Has never has an episode of speech difficulty in the past.    On arrival to the ED, her vitals were stable. CTA MP showed no acute abnormality.            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 Jensen     Past Surgical History:   Procedure Laterality Date    APPENDECTOMY      BREAST SURGERY      CHOLECYSTECTOMY       Family History   Problem Relation Age of Onset    Diabetes Mother     Cancer Mother 35        colon    Deep vein thrombosis Mother         during pregnancy    Multiple myeloma Mother     Stroke Father     Breast cancer Maternal Aunt 30    Breast cancer Maternal Aunt 50     Social History     Tobacco Use    Smoking status: Never Smoker    Smokeless tobacco: Never Used   Substance Use Topics    Alcohol use: Yes     Alcohol/week: 4.2 oz     Types: 7 Glasses of wine per week     Frequency: Monthly or less     Drinks per session: 1 or 2     Binge frequency: Never     Comment: champagne only/ occasional    Drug use: No     Review of patient's allergies indicates:   Allergen Reactions    Insect venom Anaphylaxis     Ant    Amoxicillin Nausea Only    Bee sting [allergen ext-venom-honey bee]     Codeine     Zithromax [azithromycin] Hives    Prednisone      psychosis    Zolpidem      HA       Medications: I have reviewed the current medication administration record.      (Not in a hospital admission)    Review of Systems   Constitutional: Negative for chills and fever.   HENT: Negative for trouble swallowing.    Gastrointestinal:  Positive for abdominal pain, nausea and vomiting.   Neurological: Positive for facial asymmetry, speech difficulty and headaches.     Objective:     Vital Signs (Most Recent):  Temp: 97.7 °F (36.5 °C) (12/12/18 0046)  Pulse: 73 (12/12/18 0101)  Resp: 18 (12/12/18 0046)  BP: 135/74 (12/12/18 0101)  SpO2: 98 % (12/12/18 0101)    Vital Signs Range (Last 24H):  Temp:  [97.7 °F (36.5 °C)]   Pulse:  [68-73]   Resp:  [18]   BP: (132-135)/(74)   SpO2:  [98 %-99 %]     Physical Exam   Constitutional: She is oriented to person, place, and time. No distress.   Eyes: EOM are normal. Pupils are equal, round, and reactive to light.   Cardiovascular: Normal rate.   Pulmonary/Chest: Effort normal.   Abdominal: Soft. Bowel sounds are normal.   Neurological: She is alert and oriented to person, place, and time.                         R pronator drift+  Nursing note and vitals reviewed.      Neurological Exam:   LOC: alert  Language: No aphasia  Articulation: Mild dysarthria  Orientation: Person, Place, Time   EOM (CN III, IV, VI): Full/intact  Pupils (CN II, III): PERRL  Facial Movement (CN VII): Lower facial weakness on the Right  Motor: Arm left  Paresis: 4/5  Leg left  Paresis: 4+/5  Arm right  Normal 5/5  Leg right Normal 5/5  Cebellar: Upper Extremity Appendicular Ataxia (Finger Nose Finger)  Left  Sensation: Intact to light touch, temperature and vibration      Laboratory:  CMP:   Recent Labs   Lab 12/12/18 0104   CALCIUM 9.5   ALBUMIN 4.0   PROT 7.1      K 3.8   CO2 27      BUN 11   CREATININE 0.9   ALKPHOS 58   ALT 12   AST 19   BILITOT 0.5     CBC:   Recent Labs   Lab 12/12/18 0104   WBC 11.22   RBC 4.09   HGB 13.0   HCT 39.0      MCV 95   MCH 31.8*   MCHC 33.3     Lipid Panel:   Recent Labs   Lab 12/12/18 0104   CHOL 187   LDLCALC 110.0   HDL 60   TRIG 85     Coagulation:   Recent Labs   Lab 12/12/18 0104   INR 1.0     Hgb A1C: No results for input(s): HGBA1C in the last 168 hours.  TSH:   Recent  Labs   Lab 12/12/18  0104   TSH 3.608       Diagnostic Results:      Brain/Vessel imaging:    CTA Multiphase 12/12/2018:    No acute intracranial hemorrhage or major vascular distribution infarct.    No high-grade stenosis or major vessel occlusion.      Bunny Ramírez MD  Comprehensive Stroke Center  Department of Vascular Neurology   [facility]

## 2018-12-12 NOTE — ASSESSMENT & PLAN NOTE
53 yr old with acute onset stomach pain, emesis, left sided headache and speech difficulty last night. LKN 11:30pm. Says she was having trouble getting words out and also slurring her speech. Has had episodes of transient weakness , visual changes in the past with residual deficits. Recent had Bell's palsy on the left side.  No history of speech disturbance in the past. Never been diagnosed with HTN, DM. Not a smoker. Denies being on any medications, except for Neksium.   CTA head and neck showed no acute intracranial hemorrhage or major vascular distribution infarct. No high-grade stenosis or major vessel occlusion.    -- Speech improving from the time she came into the ED.   -- Recommend MRI brain W WO contrast, preferably demyelinating protocol. As she has never been on any Rx for MS, she needs one to assess disease burden. Further recs depending on her MRI.  -- Infectious workup; UA, CXR     Antithrombotics for secondary stroke prevention: Antiplatelets: None: Patient/family refusal and No stroke. History not suggestive of TIA either.  Anticoagulants: N/A    Statins for secondary stroke prevention and hyperlipidemia, if present:   Statins: None: Reason: ASCVD score of 1.3%; Less than 5% risk of cardiovascular event (coronary or stroke death or non-fatal MI or stroke) in the next 10yrs.  LDL- 110    Aggressive risk factor modification: Needs follow up MRI W contrast to assess for demyelinating lesions     Rehab efforts: PT/OT/SLP to evaluate and treat    Diagnostics ordered/pending: HgbA1C to assess blood glucose levels, Other: MRI Brain W WO Contrast Demyelinating protocol.    VTE prophylaxis: Heparin 5000 units SQ every 8 hours    BP parameters: SBP<140

## 2018-12-12 NOTE — SUBJECTIVE & OBJECTIVE
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 Jensen     Past Surgical History:   Procedure Laterality Date    APPENDECTOMY      BREAST SURGERY      CHOLECYSTECTOMY       Family History   Problem Relation Age of Onset    Diabetes Mother     Cancer Mother 35        colon    Deep vein thrombosis Mother         during pregnancy    Multiple myeloma Mother     Stroke Father     Breast cancer Maternal Aunt 30    Breast cancer Maternal Aunt 50     Social History     Tobacco Use    Smoking status: Never Smoker    Smokeless tobacco: Never Used   Substance Use Topics    Alcohol use: Yes     Alcohol/week: 4.2 oz     Types: 7 Glasses of wine per week     Frequency: Monthly or less     Drinks per session: 1 or 2     Binge frequency: Never     Comment: champagne only/ occasional    Drug use: No     Review of patient's allergies indicates:   Allergen Reactions    Insect venom Anaphylaxis     Ant    Amoxicillin Nausea Only    Bee sting [allergen ext-venom-honey bee]     Codeine     Zithromax [azithromycin] Hives    Prednisone      psychosis    Zolpidem      HA       Medications: I have reviewed the current medication administration record.      (Not in a hospital admission)    Review of Systems   Constitutional: Negative for chills and fever.   HENT: Negative for trouble swallowing.    Gastrointestinal: Positive for abdominal pain, nausea and vomiting.   Neurological: Positive for facial asymmetry, speech difficulty and headaches.     Objective:     Vital Signs (Most Recent):  Temp: 97.7 °F (36.5 °C) (12/12/18  0046)  Pulse: 73 (12/12/18 0101)  Resp: 18 (12/12/18 0046)  BP: 135/74 (12/12/18 0101)  SpO2: 98 % (12/12/18 0101)    Vital Signs Range (Last 24H):  Temp:  [97.7 °F (36.5 °C)]   Pulse:  [68-73]   Resp:  [18]   BP: (132-135)/(74)   SpO2:  [98 %-99 %]     Physical Exam   Constitutional: She is oriented to person, place, and time. No distress.   Eyes: EOM are normal. Pupils are equal, round, and reactive to light.   Cardiovascular: Normal rate.   Pulmonary/Chest: Effort normal.   Abdominal: Soft. Bowel sounds are normal.   Neurological: She is alert and oriented to person, place, and time.   Nursing note and vitals reviewed.      Neurological Exam:   LOC: alert  Language: No aphasia  Articulation: Mild dysarthria  Orientation: Person, Place, Time   EOM (CN III, IV, VI): Full/intact  Pupils (CN II, III): PERRL  Facial Movement (CN VII): Lower facial weakness on the Right  Motor: Arm left  Paresis: 4/5  Leg left  Paresis: 4/5  Arm right  Normal 5/5  Leg right Normal 5/5  Cebellar: Upper Extremity Appendicular Ataxia (Finger Nose Finger)  Left  Sensation: Intact to light touch, temperature and vibration      Laboratory:  CMP:   Recent Labs   Lab 12/12/18 0104   CALCIUM 9.5   ALBUMIN 4.0   PROT 7.1      K 3.8   CO2 27      BUN 11   CREATININE 0.9   ALKPHOS 58   ALT 12   AST 19   BILITOT 0.5     CBC:   Recent Labs   Lab 12/12/18 0104   WBC 11.22   RBC 4.09   HGB 13.0   HCT 39.0      MCV 95   MCH 31.8*   MCHC 33.3     Lipid Panel:   Recent Labs   Lab 12/12/18 0104   CHOL 187   LDLCALC 110.0   HDL 60   TRIG 85     Coagulation:   Recent Labs   Lab 12/12/18 0104   INR 1.0     Hgb A1C: No results for input(s): HGBA1C in the last 168 hours.  TSH:   Recent Labs   Lab 12/12/18 0104   TSH 3.608       Diagnostic Results:      Brain/Vessel imaging:    CTA Multiphase 12/12/2018:    No acute intracranial hemorrhage or major vascular distribution infarct.    No high-grade stenosis or major vessel occlusion.

## 2018-12-12 NOTE — ED TRIAGE NOTES
Hailey Sullivan, a 53 y.o. female presents to the ED w/ complaint of a burning sensation to the chest pain. Pt has a history of reflux. Pt states when she sat up she became dizzy, vomiting, and had expressive aphasic for 45 minutes. Pt speaking clear and fluent now. Pt denies any other deficits. Pt denies SOB, fever    Triage note:  Chief Complaint   Patient presents with    Dizziness    Emesis    Chest Pain     Review of patient's allergies indicates:   Allergen Reactions    Insect venom Anaphylaxis     Ant    Amoxicillin Nausea Only    Bee sting [allergen ext-venom-honey bee]     Codeine     Zithromax [azithromycin] Hives    Prednisone      psychosis    Zolpidem      HA     Past Medical History:   Diagnosis Date    Asthma, currently dormant 5/13/2013    Constipation, chronic 5/16/2014    IBS - D    Endocarditis of native valve 9/12/2012    GERD (gastroesophageal reflux disease) 9/19/2014    Hypothyroidism 9/12/2012    Mild mitral and aortic regurgitation 5/13/2013    Echo 9/12    MS (multiple sclerosis) 9/12/2012    Normal cardiac stress test 6/12/2014    SE 6/14    Vision loss, left eye 9/19/2014    Dr. Jensen

## 2018-12-12 NOTE — ED PROVIDER NOTES
Encounter Date: 12/12/2018    SCRIBE #1 NOTE: I, Booker Sofia, am scribing for, and in the presence of,  Alvina Moe MD. I have scribed the entire note.       History     Chief Complaint   Patient presents with    Dizziness    Emesis    Chest Pain     53 year old female with past medical history of multiple sclerosis, GERD, endocarditis of the native valve, and hypothyroidism presents to the ED with complaints of dizziness, emesis and chest pain since about 2100. She states that she was perfectly fine before dinner, but after she ate she laid down on the couch and began to experience chest pain. Soon after, she began experiencing nausea and vomiting. Upon sitting up, she began to feel very dizzy and started feeling pain in the left occipital region of her head and left neck. She then went upstairs to her bed room to lay down, however her vomiting and vertigo became worse. Around 2300 she began to become confused and had trouble articulating words, and would get stuck on one word for an extended period of time according to her . She states that on her birthday ([birth year]) she had an episode of Bell's Palsy, and had a left sided facial droop. Upon arrival to the ED, her speech is still slowed (not as bad as earlier) and she has a slight facial droop. She denies any numbness, weakness, or vision changes.          Review of patient's allergies indicates:   Allergen Reactions    Insect venom Anaphylaxis     Ant    Amoxicillin Nausea Only    Bee sting [allergen ext-venom-honey bee]     Codeine     Zithromax [azithromycin] Hives    Prednisone      psychosis    Zolpidem      HA     Past Medical History:   Diagnosis Date    Asthma, currently dormant 5/13/2013    Constipation, chronic 5/16/2014    IBS - D    Endocarditis of native valve 9/12/2012    GERD (gastroesophageal reflux disease) 9/19/2014    Hypothyroidism 9/12/2012    Mild mitral and aortic regurgitation 5/13/2013    Echo 9/12    MS  (multiple sclerosis) 9/12/2012    Normal cardiac stress test 6/12/2014    SE 6/14    Vision loss, left eye 9/19/2014    Dr. Jensen     Past Surgical History:   Procedure Laterality Date    APPENDECTOMY      BREAST SURGERY      CHOLECYSTECTOMY       Family History   Problem Relation Age of Onset    Diabetes Mother     Cancer Mother 35        colon    Deep vein thrombosis Mother         during pregnancy    Multiple myeloma Mother     Stroke Father     Breast cancer Maternal Aunt 30    Breast cancer Maternal Aunt 50     Social History     Tobacco Use    Smoking status: Never Smoker    Smokeless tobacco: Never Used   Substance Use Topics    Alcohol use: Yes     Alcohol/week: 4.2 oz     Types: 7 Glasses of wine per week     Frequency: Monthly or less     Drinks per session: 1 or 2     Binge frequency: Never     Comment: champagne only/ occasional    Drug use: No     Review of Systems   Constitutional: Negative for fever.   HENT: Negative for sore throat.    Eyes: Negative for visual disturbance.   Respiratory: Negative for shortness of breath.    Cardiovascular: Positive for chest pain.   Gastrointestinal: Positive for nausea and vomiting.   Genitourinary: Negative for dysuria.   Musculoskeletal: Positive for neck pain (Left side pain onset upon becoming dizzy ).   Skin: Negative for rash.   Neurological: Positive for dizziness, facial asymmetry, speech difficulty, light-headedness and headaches (Left occipital region pain onset upon becoming dizzy). Negative for weakness and numbness.   Psychiatric/Behavioral: Positive for confusion.       Physical Exam     Initial Vitals [12/12/18 0046]   BP Pulse Resp Temp SpO2   132/74 72 18 97.7 °F (36.5 °C) 99 %      MAP       --         Physical Exam    Nursing note and vitals reviewed.  Constitutional: She appears well-developed and well-nourished. No distress.   Patient appears anxious.   HENT:   Head: Normocephalic and atraumatic.   Mouth/Throat: Oropharynx is  clear and moist.   Eyes: EOM are normal. Pupils are equal, round, and reactive to light.   Neck: Normal range of motion. Neck supple. No tracheal deviation present. No JVD present.   Cardiovascular: Normal rate, regular rhythm and normal heart sounds. Exam reveals no gallop and no friction rub.    No murmur heard.  Pulmonary/Chest: Breath sounds normal. No respiratory distress.   Abdominal: Soft. Bowel sounds are normal. She exhibits no distension. There is no tenderness. There is no rebound and no guarding.   Musculoskeletal: She exhibits no edema or tenderness.   Neurological: She is alert.   Patient is able to answer questions appropriately and follow commands. She has aright facial droop. Subtle right pronator drift. Speech is fluent and does not have difficulty naming objects. Patient reports difficulty articulating words during exam. Bilateral dysmetria.    Skin: No rash noted. No erythema.         ED Course   Procedures  Labs Reviewed   CBC W/ AUTO DIFFERENTIAL - Abnormal; Notable for the following components:       Result Value    MCH 31.8 (*)     MPV 9.0 (*)     Gran # (ANC) 9.0 (*)     Gran% 80.0 (*)     Lymph% 11.3 (*)     All other components within normal limits   COMPREHENSIVE METABOLIC PANEL - Abnormal; Notable for the following components:    Glucose 114 (*)     Anion Gap 7 (*)     All other components within normal limits   PROTIME-INR   TSH   LIPID PANEL   TROPONIN I   POCT GLUCOSE   ISTAT PROCEDURE   ISTAT CREATININE          Imaging Results          X-Ray Chest AP Portable (Final result)  Result time 12/12/18 03:11:52    Final result by Don Matthews MD (12/12/18 03:11:52)                 Impression:      No acute radiographic findings.    Electronically signed by resident: Alirio Escobar  Date:    12/12/2018  Time:    03:00    Electronically signed by: Don Matthews MD  Date:    12/12/2018  Time:    03:11             Narrative:    EXAMINATION:  XR CHEST AP PORTABLE    CLINICAL  HISTORY:  Stroke;    TECHNIQUE:  Single frontal view of the chest was performed.    COMPARISON:  CTA chest abdomen pelvis 12/12/2018.    FINDINGS:  Lungs are symmetrically expanded without focal consolidation, effusion or pneumothorax.    Cardiomediastinal silhouette and pulmonary vasculature is within normal limits.    Osseous structures are intact.                               CTA Chest Abdomen Pelvis (Final result)  Result time 12/12/18 03:22:49    Final result by Don Matthews MD (12/12/18 03:22:49)                 Impression:      No acute findings in the chest, abdomen or pelvis, specifically no evidence for dissection, aneurysm or stenosis.    Electronically signed by resident: Alirio Escobar  Date:    12/12/2018  Time:    02:33    Electronically signed by: Don Matthews MD  Date:    12/12/2018  Time:    03:22             Narrative:    EXAMINATION:  CTA CHEST ABDOMEN PELVIS    CLINICAL HISTORY:  dissection;    TECHNIQUE:  Axial CT of the chest was initially performed without contrast.  Axial CT angiogram images of the chest, abdomen, and pelvis were obtained after the administration of 50 cc of Omnipaque 350 IV, from the lung apices through the ischial tuberosities, per aortic dissection protocol.  Delayed portal venous phase images through the abdomen and pelvis were obtained.  Coronal and sagittal reconstructions of the abdominal aorta and visceral arteries performed.    COMPARISON:  CT abdomen pelvis 09/08/2017.    FINDINGS:  CHEST:    Soft tissue structures of the base of the neck are unremarkable.    The esophagus maintains a normal course and caliber. There is no axillary, mediastinal, or hilar lymphadenopathy.    The trachea is midline, the proximal airways are patent and the lungs are symmetrically expanded.   Examination of the lung fields demonstrates mild bibasilar atelectasis.  No evidence of consolidation, pneumothorax, mass, or significant pleural thickening, nor is there evidence of pleural  fluid.    The heart is normal in size and there is no evidence of pericardial effusion.    VASCULAR:    There is no evidence of aortic dissection, aneurysm, or stenosis.  There is extensive atherosclerotic change of the thoracic and abdominal aorta.    The pulmonary arteries distribute normally. There is no evidence of intraluminal filling defect the level of the segmental arteries bilaterally to suggest pulmonary thromboembolism.    ABDOMEN/PELVIS:    Evaluation is limited by extensive streak artifact due to the patient's arms overlying the field of view.    The liver is normal in size and attenuation with no focal hepatic abnormality.  There is no intra-or extrahepatic biliary ductal dilatation. The stomach, spleen, pancreas, and adrenal glands are unremarkable.  Status post cholecystectomy.    The kidneys are normal in size and location.  There is no evidence of hydronephrosis.  The ureters appear normal in course and caliber without evidence of ureteral dilatation. The urinary bladder demonstrates no significant abnormality.  Incidental note is made of prominent vessels of the left uterus and hemipelvis with a prominent left gonadal vein, nonspecific and can be seen with pelvic congestion.    The visualized loops of small and large bowel show no evidence of obstruction or inflammation.  There is no ascites, free fluid, or intraperitoneal free air. There is no evidence of lymph node enlargement in the abdomen or pelvis.    Osseous structures demonstrate mild degenerative change.  The extraperitoneal soft tissues are unremarkable.                               CTA STROKE MULTI-PHASE (Final result)  Result time 12/12/18 02:44:33   Procedure changed from CTA Head and Neck (xpd)     Final result by Don Matthews MD (12/12/18 02:44:33)                 Impression:      No acute intracranial hemorrhage or major vascular distribution infarct.    No high-grade stenosis or major vessel occlusion.    Electronically  signed by resident: Alirio Escobar  Date:    12/12/2018  Time:    02:03    Electronically signed by: Don Matthews MD  Date:    12/12/2018  Time:    02:44             Narrative:    EXAMINATION:  CTA STROKE MULTI-PHASE    CLINICAL HISTORY:  stroke;    TECHNIQUE:  Non contrast low dose axial images were obtained thought the head. CT angiogram was performed from the level of the avani to the top of the head following the IV administration of 75mL of Omnipaque 350.   Sagittal and coronal reconstructions and maximum intensity projection reconstructions were performed. Arterial stenosis percentages are based on NASCET measurement criteria.  Two additional phases of immediate post-contrast CTA images were performed through the head alone.    COMPARISON:  None    FINDINGS:  Intracranial Compartment:    Ventricles and sulci are normal in size for age without evidence of hydrocephalus. No extra-axial blood or fluid collections.    The brain parenchyma appears normal. No parenchymal mass, hemorrhage, edema, or major vascular distribution infarct.    Skull/Extracranial Contents (limited evaluation): No fracture.  Mild mucosal thickening of the left maxillary sinus.  Mastoid air cells are essentially clear.    Non-Vascular Structures of the Neck/Thoracic Inlet (limited evaluation): Normal.    Aorta: Normal 3 vessel arch.    Extracranial carotid circulation: No hemodynamically significant stenosis, aneurysmal dilatation, or dissection.    Extracranial vertebral circulation: No hemodynamically significant stenosis, aneurysmal dilatation, or dissection.    Intracranial Arteries: No focal high-grade stenosis, occlusion, or aneurysm.    Venous structures (limited evaluation): Normal.                                 Medical Decision Making:   Initial Assessment:   52 yo f, bicuspid aortic valve, MS (not on meds)  CP, radiation to back  Followed by neuro sx: occipital HA/dizziness/n/v/neck pain  Then developed expressive aphasia around  11:30 pm, x 1 h, improved en route    On my exam, pt alert, cooperative, following commands  ? Mild expressive aphasia  R pronator drift and R facial droop  Differential Diagnosis:   Stroke  MS flare  Dissection   ED Management:  Stroke code activated on arrival  CT head  CTA head/neck/chest/abd/pelvis  Stroke team evaluated pt  dispo uncertain    3:38 AM  All imaging negative  Vascular neuro recommending MRI to assess for MS flare  Discussed this with pt and she is refusing further testing  She understands that MI/ACS and MS flare have NOT been ruled out at this time and that these could be serious or life-threatining situations.  Pt states that CP c/w reflux and feels like she's possibly having a MS flare but that her sx usually resolve without any treatment.  Pt is willing to accept referral to outpatient neuro clinic.    Pt has decision-making capacity.   at bedside throughout discussion            Scribe Attestation:   Scribe #1: I performed the above scribed service and the documentation accurately describes the services I performed. I attest to the accuracy of the note.    I, Dr. Alvina Moe, personally performed the services described in this documentation. All medical record entries made by the scribe were at my direction and in my presence.  I have reviewed the chart and agree that the record reflects my personal performance and is accurate and complete. Alvina Moe MD.  2:40 AM 12/15/2018             Clinical Impression:   The primary encounter diagnosis was Chest pain. A diagnosis of Stroke was also pertinent to this visit.                             Alvina Moe MD  12/15/18 0909

## 2018-12-12 NOTE — ED NOTES
"Pt states "I need to go now since they believe I am not having a stroke. I feel fine. My  needs to go to work in the morning." After explaining to the pt that even though stroke might have been ruled out but it doesn't mean nothing is wrong and would like for all her results to come back to make sure she is okay, pt states "well you have 15 minutes before I have to leave." Dr. Moe notified and aware  "

## 2018-12-12 NOTE — ASSESSMENT & PLAN NOTE
-- On Neksium  -- S/p EGD on 9/25/17, biopsy showed erosive gastritis, negative for H pylori  -- f/u with GI

## 2018-12-12 NOTE — ED NOTES
Patient identifiers verified and correct for Hailey Sullivan.    LOC: The patient is awake, alert and aware of environment with an appropriate affect, the patient is oriented x 3 and speaking appropriately.  APPEARANCE: Patient resting comfortably and in no acute distress, patient is clean and well groomed, patient's clothing is properly fastened.  SKIN: The skin is warm and dry, color consistent with ethnicity, patient has normal skin turgor and moist mucus membranes, skin intact, no breakdown or bruising noted.  MUSCULOSKELETAL: Patient moving all extremities spontaneously, no obvious swelling or deformities noted.  RESPIRATORY: Airway is open and patent, respirations are spontaneous, patient has a normal effort and rate, no accessory muscle use noted, bilateral breath sounds even and clear.  CARDIAC: Patient has a normal rate and regular rhythm, no periphreal edema noted, capillary refill < 3 seconds.  ABDOMEN: Soft and non tender to palpation, no distention noted, normoactive bowel sounds present in all four quadrants.  NEUROLOGIC: Pupils equal bilaterally, eyes open spontaneously, behavior appropriate to situation, follows commands, facial expression symmetrical, bilateral hand grasp equal and even, purposeful motor response noted, normal sensation in all extremities when touched with a finger.

## 2018-12-12 NOTE — HPI
53 yr old female with PMH of MS not on any rx, GERD, endocarditis of the native valve, hypothyroidism, who presented to the ED with acute onset dizziness, emesis, left sided headache and speech difficulty. LKN was 11pm on 12/11. Patient says that she started experiencing stomach pain which progressed to chest pain and a left sided headache. Had difficulty getting words out; per the , she kept repeating the words 'mind' and 'help'. Says that she was having trouble getting words out but was able to understand him but also says that she was a bit confused. Also noticed that her speech was slurred. Had 2 episodes of emesis. States that she had an episode of Bell's Palsy a month ago after which she developed a left sided facial droop. Denies any acute visual changes although she does says that she's had blurred vision on and off in the left eye for the past couple of years.  She denies any dysphagia/focal weakness/numbness.  Diagnosed with MS 22 yrs ago in [place]. No recent imaging. Has not been on any medication for it as she does not like taking meds. Has had episodes of transient weakness, incoordination in the past and some residual deficits from it but did not experience any exacerbation of those deficits during her current episode. Has never has an episode of speech difficulty in the past.    On arrival to the ED, her vitals were stable. CTA MP showed no acute abnormality.

## 2018-12-12 NOTE — ASSESSMENT & PLAN NOTE
-- Diagnosed in [place] 22 yrs ago.  -- Never been on any rx for it.   -- MRI brain W WO contrast demyelinating protocol.

## 2018-12-12 NOTE — ASSESSMENT & PLAN NOTE
-- Diagnosed at age 21.   -- Received abx  -- Gets an ECHO every 2 yrs. Follows up with her PCP, Dr Guallpa  -- Last ECHO 21/8/18:             1) Heart pumps well.              2) Aortic valve has a bit more regurg than 2 years ago. Now mild/moderate.              3) Mild MVP with mild MVR.

## 2018-12-28 ENCOUNTER — CLINICAL SUPPORT (OUTPATIENT)
Dept: INTERNAL MEDICINE | Facility: CLINIC | Age: 53
End: 2018-12-28
Payer: COMMERCIAL

## 2018-12-28 DIAGNOSIS — J06.9 UPPER RESPIRATORY TRACT INFECTION, UNSPECIFIED TYPE: Primary | ICD-10-CM

## 2018-12-28 PROCEDURE — 96372 THER/PROPH/DIAG INJ SC/IM: CPT | Mod: S$GLB,,, | Performed by: INTERNAL MEDICINE

## 2018-12-28 RX ORDER — CEFTRIAXONE 1 G/1
1 INJECTION, POWDER, FOR SOLUTION INTRAMUSCULAR; INTRAVENOUS
Status: COMPLETED | OUTPATIENT
Start: 2018-12-28 | End: 2018-12-28

## 2018-12-28 RX ORDER — METHYLPREDNISOLONE ACETATE 80 MG/ML
80 INJECTION, SUSPENSION INTRA-ARTICULAR; INTRALESIONAL; INTRAMUSCULAR; SOFT TISSUE
Status: COMPLETED | OUTPATIENT
Start: 2018-12-28 | End: 2018-12-28

## 2018-12-28 RX ORDER — TRIAMCINOLONE ACETONIDE 40 MG/ML
40 INJECTION, SUSPENSION INTRA-ARTICULAR; INTRAMUSCULAR
Status: COMPLETED | OUTPATIENT
Start: 2018-12-28 | End: 2018-12-28

## 2018-12-28 RX ADMIN — METHYLPREDNISOLONE ACETATE 80 MG: 80 INJECTION, SUSPENSION INTRA-ARTICULAR; INTRALESIONAL; INTRAMUSCULAR; SOFT TISSUE at 11:12

## 2018-12-28 RX ADMIN — TRIAMCINOLONE ACETONIDE 40 MG: 40 INJECTION, SUSPENSION INTRA-ARTICULAR; INTRAMUSCULAR at 11:12

## 2018-12-28 RX ADMIN — CEFTRIAXONE 1 G: 1 INJECTION, POWDER, FOR SOLUTION INTRAMUSCULAR; INTRAVENOUS at 11:12

## 2019-02-19 DIAGNOSIS — F41.9 ANXIETY: ICD-10-CM

## 2019-02-19 RX ORDER — ALPRAZOLAM 1 MG/1
TABLET ORAL
Qty: 30 TABLET | Refills: 2 | Status: SHIPPED | OUTPATIENT
Start: 2019-02-19 | End: 2019-06-15 | Stop reason: SDUPTHER

## 2019-04-10 ENCOUNTER — CLINICAL SUPPORT (OUTPATIENT)
Dept: INTERNAL MEDICINE | Facility: CLINIC | Age: 54
End: 2019-04-10
Attending: INTERNAL MEDICINE
Payer: COMMERCIAL

## 2019-04-10 DIAGNOSIS — J06.9 UPPER RESPIRATORY TRACT INFECTION, UNSPECIFIED TYPE: ICD-10-CM

## 2019-04-10 DIAGNOSIS — J32.9 SINUSITIS, UNSPECIFIED CHRONICITY, UNSPECIFIED LOCATION: Primary | ICD-10-CM

## 2019-04-10 PROCEDURE — 96372 THER/PROPH/DIAG INJ SC/IM: CPT | Mod: S$GLB,,, | Performed by: INTERNAL MEDICINE

## 2019-04-10 PROCEDURE — 96372 PR INJECTION,THERAP/PROPH/DIAG2ST, IM OR SUBCUT: ICD-10-PCS | Mod: S$GLB,,, | Performed by: INTERNAL MEDICINE

## 2019-04-10 RX ORDER — METHYLPREDNISOLONE ACETATE 80 MG/ML
80 INJECTION, SUSPENSION INTRA-ARTICULAR; INTRALESIONAL; INTRAMUSCULAR; SOFT TISSUE
Status: COMPLETED | OUTPATIENT
Start: 2019-04-10 | End: 2019-04-10

## 2019-04-10 RX ORDER — TRIAMCINOLONE ACETONIDE 40 MG/ML
40 INJECTION, SUSPENSION INTRA-ARTICULAR; INTRAMUSCULAR ONCE
Status: COMPLETED | OUTPATIENT
Start: 2019-04-10 | End: 2019-04-10

## 2019-04-10 RX ORDER — CEFTRIAXONE 1 G/1
1 INJECTION, POWDER, FOR SOLUTION INTRAMUSCULAR; INTRAVENOUS
Status: COMPLETED | OUTPATIENT
Start: 2019-04-10 | End: 2019-04-10

## 2019-04-10 RX ADMIN — METHYLPREDNISOLONE ACETATE 80 MG: 80 INJECTION, SUSPENSION INTRA-ARTICULAR; INTRALESIONAL; INTRAMUSCULAR; SOFT TISSUE at 02:04

## 2019-04-10 RX ADMIN — CEFTRIAXONE 1 G: 1 INJECTION, POWDER, FOR SOLUTION INTRAMUSCULAR; INTRAVENOUS at 02:04

## 2019-04-10 RX ADMIN — TRIAMCINOLONE ACETONIDE 40 MG: 40 INJECTION, SUSPENSION INTRA-ARTICULAR; INTRAMUSCULAR at 02:04

## 2019-05-06 DIAGNOSIS — I08.0 MILD MITRAL AND AORTIC REGURGITATION: Primary | ICD-10-CM

## 2019-05-07 ENCOUNTER — OFFICE VISIT (OUTPATIENT)
Dept: INTERNAL MEDICINE | Facility: CLINIC | Age: 54
End: 2019-05-07
Attending: INTERNAL MEDICINE
Payer: COMMERCIAL

## 2019-05-07 VITALS
WEIGHT: 116 LBS | HEIGHT: 66 IN | BODY MASS INDEX: 18.64 KG/M2 | HEART RATE: 67 BPM | DIASTOLIC BLOOD PRESSURE: 72 MMHG | OXYGEN SATURATION: 98 % | SYSTOLIC BLOOD PRESSURE: 100 MMHG

## 2019-05-07 DIAGNOSIS — R51.9 NONINTRACTABLE HEADACHE, UNSPECIFIED CHRONICITY PATTERN, UNSPECIFIED HEADACHE TYPE: ICD-10-CM

## 2019-05-07 DIAGNOSIS — Q23.1 BICUSPID AORTIC VALVE: ICD-10-CM

## 2019-05-07 DIAGNOSIS — I08.0 MILD MITRAL AND AORTIC REGURGITATION: ICD-10-CM

## 2019-05-07 DIAGNOSIS — K21.9 GASTROESOPHAGEAL REFLUX DISEASE, ESOPHAGITIS PRESENCE NOT SPECIFIED: Primary | ICD-10-CM

## 2019-05-07 DIAGNOSIS — R63.4 WEIGHT LOSS: ICD-10-CM

## 2019-05-07 DIAGNOSIS — G47.00 INSOMNIA, UNSPECIFIED TYPE: ICD-10-CM

## 2019-05-07 DIAGNOSIS — K29.30 SUPERFICIAL GASTRITIS WITHOUT HEMORRHAGE, UNSPECIFIED CHRONICITY: ICD-10-CM

## 2019-05-07 PROBLEM — R47.9 DIFFICULTY WITH SPEECH: Status: RESOLVED | Noted: 2018-12-12 | Resolved: 2019-05-07

## 2019-05-07 PROCEDURE — 3008F BODY MASS INDEX DOCD: CPT | Mod: CPTII,S$GLB,, | Performed by: INTERNAL MEDICINE

## 2019-05-07 PROCEDURE — 93000 PR ELECTROCARDIOGRAM, COMPLETE: ICD-10-PCS | Mod: S$GLB,,, | Performed by: INTERNAL MEDICINE

## 2019-05-07 PROCEDURE — 3008F PR BODY MASS INDEX (BMI) DOCUMENTED: ICD-10-PCS | Mod: CPTII,S$GLB,, | Performed by: INTERNAL MEDICINE

## 2019-05-07 PROCEDURE — 99214 OFFICE O/P EST MOD 30 MIN: CPT | Mod: 25,S$GLB,, | Performed by: INTERNAL MEDICINE

## 2019-05-07 PROCEDURE — 93000 ELECTROCARDIOGRAM COMPLETE: CPT | Mod: S$GLB,,, | Performed by: INTERNAL MEDICINE

## 2019-05-07 PROCEDURE — 99214 PR OFFICE/OUTPT VISIT, EST, LEVL IV, 30-39 MIN: ICD-10-PCS | Mod: 25,S$GLB,, | Performed by: INTERNAL MEDICINE

## 2019-05-07 RX ORDER — PHENOBARBITAL, HYOSCYAMINE SULFATE, ATROPINE SULFATE AND SCOPOLAMINE HYDROBROMIDE .0194; .1037; 16.2; .0065 MG/1; MG/1; MG/1; MG/1
1 TABLET ORAL 3 TIMES DAILY PRN
Qty: 60 TABLET | Refills: 0 | Status: SHIPPED | OUTPATIENT
Start: 2019-05-07 | End: 2020-11-06

## 2019-05-07 RX ORDER — DEXLANSOPRAZOLE 60 MG/1
60 CAPSULE, DELAYED RELEASE ORAL DAILY
Qty: 30 CAPSULE | Refills: 11
Start: 2019-05-07 | End: 2020-11-10

## 2019-05-07 RX ORDER — AMITRIPTYLINE HYDROCHLORIDE 10 MG/1
1 TABLET, FILM COATED ORAL NIGHTLY
Refills: 3 | COMMUNITY
Start: 2019-05-01 | End: 2020-11-10

## 2019-05-07 NOTE — PROGRESS NOTES
Subjective:       Patient ID: Hailey Sullivan is a 53 y.o. female.    Chief Complaint: Follow-up; Gastroesophageal Reflux (throat irritation / hoarse voice); Weight Loss; Insomnia (because of reflux); Chest Pain (left side chest pain); Headache (3+days); Nausea; and Abdominal Pain (since taking Dexilant)    She has felt ill since her mother  approximately 1 year ago.  She has difficulty eating.  Almost any food makes her nauseated.  She has lost 12 lb over the past year. She has severe heartburn at night which has not been relieved by taking daily Dexilant.  She last had an EGD 2017 by Dr. Dior which showed gastritis.  He has recently seen Dr. Dior.  She was referred by Dr. Dior to Dr. Jarvis Miller.    She had a short episode in December where she had some difficulty with speech and facial droop.  She was seen in the emergency room and also evaluated by the stroke team at that time.  She had a CTA performed of her brain neck chest abdomen and pelvis, which were unremarkable.    She has had a headache for the past 3 days, but thinks it might be from having some workers at her house.    Gastroesophageal Reflux   She complains of abdominal pain, chest pain and nausea.   Headache    This is a new problem. The current episode started in the past 7 days. Associated symptoms include abdominal pain and nausea.   Nausea   This is a chronic problem. The current episode started more than 1 month ago. Associated symptoms include abdominal pain, chest pain, congestion, headaches and nausea.   Abdominal Pain   This is a chronic problem. The current episode started more than 1 month ago. Associated symptoms include constipation, headaches and nausea. Her past medical history is significant for GERD.     Review of Systems   Constitutional: Positive for appetite change.   HENT: Positive for congestion.    Cardiovascular: Positive for chest pain.   Gastrointestinal: Positive for abdominal distention,  abdominal pain, constipation and nausea.   Neurological: Positive for headaches.   Psychiatric/Behavioral: Positive for sleep disturbance. Negative for dysphoric mood.       Objective:      Physical Exam   Constitutional: She appears well-developed and well-nourished.   HENT:   Head: Normocephalic.   Eyes: Pupils are equal, round, and reactive to light.   Cardiovascular: Normal rate and regular rhythm. Exam reveals no friction rub.   Murmur heard.   Systolic murmur is present with a grade of 2/6.  Pulmonary/Chest: Effort normal.   Abdominal: Soft. Bowel sounds are normal. There is tenderness in the epigastric area. There is no rebound.   Neurological: She is alert.       Assessment:       1. Gastroesophageal reflux disease, esophagitis presence not specified    2. Superficial gastritis without hemorrhage, unspecified chronicity    3. Mild mitral and aortic regurgitation    4. Bicuspid aortic valve    5. Weight loss    6. Insomnia, unspecified type    7. Nonintractable headache, unspecified chronicity pattern, unspecified headache type        Plan:       Per orders and D/C instructions.  Echo next week to evaluate Mitral and aortic valve regurg.  [facility] is going to start Elavil to see if if helps her nausea. She will try Donnatal.  F/u with Hemal Dior and Angela..

## 2019-05-14 ENCOUNTER — CLINICAL SUPPORT (OUTPATIENT)
Dept: INTERNAL MEDICINE | Facility: CLINIC | Age: 54
End: 2019-05-14
Attending: INTERNAL MEDICINE
Payer: COMMERCIAL

## 2019-05-14 DIAGNOSIS — I38 ENDOCARDITIS OF NATIVE VALVE: Chronic | ICD-10-CM

## 2019-05-14 DIAGNOSIS — I08.0 MILD MITRAL AND AORTIC REGURGITATION: ICD-10-CM

## 2019-05-14 DIAGNOSIS — Q23.1 BICUSPID AORTIC VALVE: ICD-10-CM

## 2019-05-14 PROCEDURE — 93000 PR ELECTROCARDIOGRAM, COMPLETE: ICD-10-PCS | Mod: S$GLB,,, | Performed by: INTERNAL MEDICINE

## 2019-05-14 PROCEDURE — 93306 TTE W/DOPPLER COMPLETE: CPT | Mod: S$GLB,,, | Performed by: INTERNAL MEDICINE

## 2019-05-14 PROCEDURE — 93000 ELECTROCARDIOGRAM COMPLETE: CPT | Mod: S$GLB,,, | Performed by: INTERNAL MEDICINE

## 2019-05-14 PROCEDURE — 93306 PR ECHO HEART XTHORACIC,COMPLETE W DOPPLER: ICD-10-PCS | Mod: S$GLB,,, | Performed by: INTERNAL MEDICINE

## 2019-05-21 ENCOUNTER — HOSPITAL ENCOUNTER (EMERGENCY)
Facility: OTHER | Age: 54
Discharge: HOME | End: 2019-05-21
Attending: EMERGENCY MEDICINE
Payer: COMMERCIAL

## 2019-05-21 VITALS
RESPIRATION RATE: 16 BRPM | BODY MASS INDEX: 18.21 KG/M2 | HEART RATE: 76 BPM | WEIGHT: 116 LBS | SYSTOLIC BLOOD PRESSURE: 124 MMHG | DIASTOLIC BLOOD PRESSURE: 71 MMHG | HEIGHT: 67 IN | TEMPERATURE: 100 F | OXYGEN SATURATION: 98 %

## 2019-05-21 DIAGNOSIS — R91.8 LUNG NODULES: ICD-10-CM

## 2019-05-21 DIAGNOSIS — R05.9 COUGH: ICD-10-CM

## 2019-05-21 DIAGNOSIS — R53.1 WEAKNESS: ICD-10-CM

## 2019-05-21 DIAGNOSIS — B34.9 ACUTE VIRAL SYNDROME: Primary | ICD-10-CM

## 2019-05-21 DIAGNOSIS — R17 TOTAL BILIRUBIN, ELEVATED: ICD-10-CM

## 2019-05-21 LAB
ALBUMIN SERPL BCP-MCNC: 4.7 G/DL (ref 3.5–5.2)
ALP SERPL-CCNC: 76 U/L (ref 55–135)
ALT SERPL W/O P-5'-P-CCNC: 15 U/L (ref 10–44)
ANION GAP SERPL CALC-SCNC: 11 MMOL/L (ref 8–16)
AST SERPL-CCNC: 20 U/L (ref 10–40)
BACTERIA #/AREA URNS HPF: ABNORMAL /HPF
BASOPHILS # BLD AUTO: 0.01 K/UL (ref 0–0.2)
BASOPHILS NFR BLD: 0.1 % (ref 0–1.9)
BILIRUB SERPL-MCNC: 1.5 MG/DL (ref 0.1–1)
BILIRUB UR QL STRIP: NEGATIVE
BUN SERPL-MCNC: 6 MG/DL (ref 6–20)
CALCIUM SERPL-MCNC: 10.2 MG/DL (ref 8.7–10.5)
CHLORIDE SERPL-SCNC: 102 MMOL/L (ref 95–110)
CLARITY UR: CLEAR
CO2 SERPL-SCNC: 27 MMOL/L (ref 23–29)
COLOR UR: YELLOW
CREAT SERPL-MCNC: 0.8 MG/DL (ref 0.5–1.4)
DEPRECATED S PYO AG THROAT QL EIA: NEGATIVE
DIFFERENTIAL METHOD: ABNORMAL
EOSINOPHIL # BLD AUTO: 0.1 K/UL (ref 0–0.5)
EOSINOPHIL NFR BLD: 0.7 % (ref 0–8)
ERYTHROCYTE [DISTWIDTH] IN BLOOD BY AUTOMATED COUNT: 12.3 % (ref 11.5–14.5)
EST. GFR  (AFRICAN AMERICAN): >60 ML/MIN/1.73 M^2
EST. GFR  (NON AFRICAN AMERICAN): >60 ML/MIN/1.73 M^2
GLUCOSE SERPL-MCNC: 91 MG/DL (ref 70–110)
GLUCOSE UR QL STRIP: NEGATIVE
HCT VFR BLD AUTO: 42.7 % (ref 37–48.5)
HGB BLD-MCNC: 14.7 G/DL (ref 12–16)
HGB UR QL STRIP: NEGATIVE
INFLUENZA A, MOLECULAR: NEGATIVE
INFLUENZA B, MOLECULAR: NEGATIVE
KETONES UR QL STRIP: NEGATIVE
LEUKOCYTE ESTERASE UR QL STRIP: ABNORMAL
LYMPHOCYTES # BLD AUTO: 1.2 K/UL (ref 1–4.8)
LYMPHOCYTES NFR BLD: 10.9 % (ref 18–48)
MCH RBC QN AUTO: 32.5 PG (ref 27–31)
MCHC RBC AUTO-ENTMCNC: 34.4 G/DL (ref 32–36)
MCV RBC AUTO: 95 FL (ref 82–98)
MICROSCOPIC COMMENT: ABNORMAL
MONOCYTES # BLD AUTO: 0.6 K/UL (ref 0.3–1)
MONOCYTES NFR BLD: 5.5 % (ref 4–15)
NEUTROPHILS # BLD AUTO: 8.9 K/UL (ref 1.8–7.7)
NEUTROPHILS NFR BLD: 82.6 % (ref 38–73)
NITRITE UR QL STRIP: NEGATIVE
PH UR STRIP: 8 [PH] (ref 5–8)
PLATELET # BLD AUTO: 158 K/UL (ref 150–350)
PMV BLD AUTO: 9.3 FL (ref 9.2–12.9)
POTASSIUM SERPL-SCNC: 3.8 MMOL/L (ref 3.5–5.1)
PROT SERPL-MCNC: 7.9 G/DL (ref 6–8.4)
PROT UR QL STRIP: NEGATIVE
RBC # BLD AUTO: 4.52 M/UL (ref 4–5.4)
RBC #/AREA URNS HPF: 1 /HPF (ref 0–4)
SODIUM SERPL-SCNC: 140 MMOL/L (ref 136–145)
SP GR UR STRIP: 1.01 (ref 1–1.03)
SPECIMEN SOURCE: NORMAL
URN SPEC COLLECT METH UR: ABNORMAL
UROBILINOGEN UR STRIP-ACNC: NEGATIVE EU/DL
WBC # BLD AUTO: 10.73 K/UL (ref 3.9–12.7)
WBC #/AREA URNS HPF: 2 /HPF (ref 0–5)

## 2019-05-21 PROCEDURE — 87880 STREP A ASSAY W/OPTIC: CPT

## 2019-05-21 PROCEDURE — 87502 INFLUENZA DNA AMP PROBE: CPT

## 2019-05-21 PROCEDURE — 93010 ELECTROCARDIOGRAM REPORT: CPT | Mod: ,,, | Performed by: INTERNAL MEDICINE

## 2019-05-21 PROCEDURE — 99285 EMERGENCY DEPT VISIT HI MDM: CPT | Mod: 25

## 2019-05-21 PROCEDURE — 81000 URINALYSIS NONAUTO W/SCOPE: CPT

## 2019-05-21 PROCEDURE — 25000003 PHARM REV CODE 250: Performed by: EMERGENCY MEDICINE

## 2019-05-21 PROCEDURE — 85025 COMPLETE CBC W/AUTO DIFF WBC: CPT

## 2019-05-21 PROCEDURE — 87081 CULTURE SCREEN ONLY: CPT

## 2019-05-21 PROCEDURE — 93005 ELECTROCARDIOGRAM TRACING: CPT

## 2019-05-21 PROCEDURE — 93010 EKG 12-LEAD: ICD-10-PCS | Mod: ,,, | Performed by: INTERNAL MEDICINE

## 2019-05-21 PROCEDURE — 80053 COMPREHEN METABOLIC PANEL: CPT

## 2019-05-21 RX ORDER — ACETAMINOPHEN 500 MG
1000 TABLET ORAL
Status: COMPLETED | OUTPATIENT
Start: 2019-05-21 | End: 2019-05-21

## 2019-05-21 RX ADMIN — ACETAMINOPHEN 1000 MG: 500 TABLET ORAL at 02:05

## 2019-05-21 NOTE — ED NOTES
Pt reports to ED with c/o weakness, SOB, and throat pain x several days. Also reports low-garde fever, congestion, pain on left sided flank area, and hx of Multiple Sclerosis. Pt denies dizziness. Pt AAO x 4 and appropriate at this time. RR even and unlabored. Pt placed on cardiac, Bp, and SpO2 monitoring. Will continue to monitor.

## 2019-05-21 NOTE — ED NOTES
"Pt requesting to have IV fluids "because i'm so dehydrated". Dr. Mosley made aware, no new orders at this time. VSS at this time. NAD, pt readjusted in bed. Call light within reach.   "

## 2019-05-21 NOTE — ED PROVIDER NOTES
"Encounter Date: 5/21/2019    SCRIBE #1 NOTE: I, Serjio Arrington, am scribing for, and in the presence of, Dr. Young.       History     Chief Complaint   Patient presents with    Shortness of Breath     pt with rib pain, sob cough and nasal congestion x 2 days.      Time seen by provider: 2:12 PM    This is a 53 y.o. female with a history MS, hypothyroidism, asthma, and endocarditis who presents with complaint of shortness of breath that began approximately two minutes ago. She reports that she is also experiencing fatigue, nasal congestion, cough, sore throat, and weakness. The patient had made an appointment with her PCP Dr. Guallpa because she has hasn't been feeling well for the last days. The patient was concerned that she had strep throat because her she was around her friends son and was around him recently. The patient's  reports that today he was going to take his wife to her doctor's appointment. She was making her way down the stairs and she started to experience shortness of breath. She states "it felt like I couldn't breath in and take in any air, I started to panic which made it worse". The patient doesn't take any medication for her MS. She reports that she notices that her MS symptoms re exacerbated when she is sick. She denies fever, chills, chest pain, nausea, and dysuria.     The history is provided by the patient and the spouse.     Review of patient's allergies indicates:   Allergen Reactions    Amoxicillin Nausea Only and Hives    Insect venom Anaphylaxis     Ant    Bee sting [allergen ext-venom-honey bee]     Codeine     Latex, natural rubber Swelling    Zithromax [azithromycin] Hives    Prednisone      psychosis    Zolpidem      HA     Past Medical History:   Diagnosis Date    Asthma, currently dormant 5/13/2013    Constipation, chronic 5/16/2014    IBS - D    Endocarditis of native valve 9/12/2012    GERD (gastroesophageal reflux disease) 9/19/2014    Hypothyroidism " 9/12/2012    Mild mitral and aortic regurgitation 5/13/2013    Echo 9/12    MS (multiple sclerosis) 9/12/2012    Normal cardiac stress test 6/12/2014    SE 6/14    Vision loss, left eye 9/19/2014    Dr. Jensen     Past Surgical History:   Procedure Laterality Date    APPENDECTOMY      BREAST SURGERY      CHOLECYSTECTOMY       Family History   Problem Relation Age of Onset    Diabetes Mother     Cancer Mother 35        colon    Deep vein thrombosis Mother         during pregnancy    Multiple myeloma Mother     Stroke Father     Breast cancer Maternal Aunt 30    Breast cancer Maternal Aunt 50     Social History     Tobacco Use    Smoking status: Never Smoker    Smokeless tobacco: Never Used   Substance Use Topics    Alcohol use: Yes     Alcohol/week: 4.2 oz     Types: 7 Glasses of wine per week     Frequency: Monthly or less     Drinks per session: 1 or 2     Binge frequency: Never     Comment: champagne only/ occasional    Drug use: No     Review of Systems   Constitutional: Negative for chills and fever.   HENT: Positive for congestion and sore throat.    Respiratory: Positive for cough and shortness of breath.    Cardiovascular: Negative for chest pain.   Gastrointestinal: Negative for nausea.   Genitourinary: Negative for dysuria.   Musculoskeletal: Negative for back pain.   Skin: Negative for rash.   Neurological: Negative for weakness.   Hematological: Does not bruise/bleed easily.       Physical Exam     Initial Vitals [05/21/19 1353]   BP Pulse Resp Temp SpO2   133/83 79 18 100 °F (37.8 °C) 100 %      MAP       --         Physical Exam    Nursing note and vitals reviewed.  Constitutional: She appears well-developed and well-nourished. She is not diaphoretic. No distress.   HENT:   Head: Normocephalic and atraumatic.   Mouth/Throat: Oropharynx is clear and moist.   Oropharynx erythematous. No peritonsillar or tonsillar abscess formation. Tonsils are edematous, erythematous with exudate. No  drooling. No trismus.    Eyes: Conjunctivae and EOM are normal. Pupils are equal, round, and reactive to light. Right eye exhibits no discharge. Left eye exhibits no discharge.   Conjunctivae are clear, pink, and intact.    Neck: Normal range of motion.   Cardiovascular: Normal rate, regular rhythm and normal heart sounds. Exam reveals no gallop and no friction rub.    No murmur heard.  Pulmonary/Chest: Breath sounds normal. No respiratory distress. She has no wheezes. She has no rhonchi. She has no rales.   Lungs are clear to auscultation bilaterally.    Abdominal: Soft. There is no tenderness. There is no rebound and no guarding.   Musculoskeletal: Normal range of motion. She exhibits no edema or tenderness.   Lymphadenopathy:     She has no cervical adenopathy.   Neurological: She is alert and oriented to person, place, and time. She has normal strength and normal reflexes. She displays normal reflexes. No cranial nerve deficit or sensory deficit. GCS score is 15. GCS eye subscore is 4. GCS verbal subscore is 5. GCS motor subscore is 6.   Skin: Skin is warm and dry. No rash and no abscess noted. No erythema. No pallor.   Psychiatric: She has a normal mood and affect. Her behavior is normal. Judgment and thought content normal.         ED Course   Procedures  Labs Reviewed   CBC W/ AUTO DIFFERENTIAL - Abnormal; Notable for the following components:       Result Value    Mean Corpuscular Hemoglobin 32.5 (*)     Gran # (ANC) 8.9 (*)     Gran% 82.6 (*)     Lymph% 10.9 (*)     All other components within normal limits   COMPREHENSIVE METABOLIC PANEL - Abnormal; Notable for the following components:    Total Bilirubin 1.5 (*)     All other components within normal limits   URINALYSIS - Abnormal; Notable for the following components:    Leukocytes, UA 1+ (*)     All other components within normal limits   URINALYSIS MICROSCOPIC - Abnormal; Notable for the following components:    Bacteria Few (*)     All other  components within normal limits   INFLUENZA A & B BY MOLECULAR   THROAT SCREEN, RAPID    Narrative:     Recoll. [id number] by PLM at 05/21/2019 14:47, reason: Insufficient   specimen. Only one swab found in container. Notified Rubina Platt RN ER at 1447.  Recoll. [id number] by PLM at 05/21/2019 14:49, reason: Insufficient   specimen   CULTURE, STREP A,  THROAT    Narrative:     Recoll. [id number] by PLM at 05/21/2019 14:47, reason: Insufficient   specimen. Only one swab found in container. Notified Rubina Platt RN ER at 1447.     EKG Readings: (Independently Interpreted)   Normal sinus rhythm at a rate of 74 bpm.        Imaging Results          CT Chest Without Contrast (Final result)  Result time 05/21/19 16:21:47    Final result by Cong Waldne MD (05/21/19 16:21:47)                 Impression:      1. No acute cardiopulmonary disease.  2. Multiple small granulomas in the lungs.  3. Trace amount of pericardial fluid, likely physiologic.  4. Cholecystectomy and other findings as above.      Electronically signed by: Cong Walden MD  Date:    05/21/2019  Time:    16:21             Narrative:    EXAMINATION:  CT CHEST WITHOUT CONTRAST    CLINICAL HISTORY:  Shortness of breath;    TECHNIQUE:  Low dose axial images, sagittal and coronal reformations were obtained from the thoracic inlet to the lung bases. Contrast was not administered.    COMPARISON:  Chest radiograph today and multiple priors, CTA chest 12/12/2018, 08/25/2016.    FINDINGS:  Visualized structures in the lower neck and both axillary areas are unremarkable.  Bilateral breast implants are again seen.    Mediastinal and hilar areas show no abnormal mass or significant lymph node enlargement.  Thoracic aorta has left arch.  Ascending thoracic aorta is upper normal caliber 3.9 cm.  No significant vascular calcifications are seen.  Heart size is normal with trace amount of pericardial effusion around it.    Visualized upper abdomen partial  shows surgical clips from cholecystectomy.    Trachea and bronchi are patent with good expansion of both lungs.  Both lungs demonstrate multiple subcentimeter nodules.  Most of these are calcified consistent with granulomas.  Calcified and noncalcified nodules have not changed significantly since the August 2016 indicating they are probably benign.  Lungs are otherwise clear without acute consolidation, pleural effusion, or pneumothorax.  The high-resolution lung images do not show emphysema or diffuse interstitial lung disease.    The skeletal structures are intact without acute fracture.                                X-Ray Chest PA And Lateral (Final result)  Result time 05/21/19 15:09:50    Final result by Eric Delarosa MD (05/21/19 15:09:50)                 Impression:      Findings concerning for left lower lobe airspace disease including aspiration or pneumonia.  Short-term follow-up chest radiography after therapy is recommended to resolution.    This report was flagged in Epic as abnormal.      Electronically signed by: Eric Delarosa MD  Date:    05/21/2019  Time:    15:09             Narrative:    EXAMINATION:  XR CHEST PA AND LATERAL    CLINICAL HISTORY:  Cough    TECHNIQUE:  PA and lateral views of the chest were performed.    COMPARISON:  Chest radiograph and CT thorax 12/12/2018    FINDINGS:  monitoring leads and tubing overlie the chest. No detrimental change. Cardiomediastinal silhouette is midline and within normal limits. Pulmonary vasculature and hilar regions are within normal limits. Subtle opacities within the medial left lower lobe.  No pleural effusion or pneumothorax.  Osseous structures appear intact.  Right upper quadrant surgical clips noted.                              X-Rays:   Independently Interpreted Readings:   Chest X-Ray: Radiologist read loft lower lobe consolidations. Will preform CT to confirm.     Medical Decision Making:   Independently Interpreted Test(s):   I have ordered  and independently interpreted X-rays - see prior notes.  I have ordered and independently interpreted EKG Reading(s) - see prior notes  Clinical Tests:   Lab Tests: Ordered and Reviewed  Radiological Study: Ordered and Reviewed  Medical Tests: Ordered and Reviewed  ED Management:  4:57 PM: Patient decline any further evaluation or transfer, for possible MS exacerbation.             Scribe Attestation:   Scribe #1: I performed the above scribed service and the documentation accurately describes the services I performed. I attest to the accuracy of the note.    Attending Attestation:           Physician Attestation for Scribe:  Physician Attestation Statement for Scribe #1: I, Dr. Young, reviewed documentation, as scribed by Serjio Arrington  in my presence, and it is both accurate and complete.         Attending ED Notes:   Emergent evaluation a 53-year-old female with complaint of nausea, cough, generalized weakness, fatigue and intermittent shortness of breath. Patient is afebrile, nontoxic-appearing stable vital signs.  Patient in no acute respiratory distress.  Lungs are clear to auscultation bilaterally. Urinary analysis reveals 1+ leukocytes and few bacteria.  Rapid strep screen is negative. Influenza screen is negative. Patient has no elevation of white blood cell count.  H&H within normal limits. No acute findings on CMP except for total bilirubin 1.5.  Chest x-ray revealed possible pneumonia.  CT of chest performed which revealed no pneumonia and no consolidation.  Patient found to have small granulomas in long and pulmonary nodules.  I extensively counseled patient on need for transfer and further evaluation at [facility] for an exacerbation of her MS.  Patient adamantly declines and states that she has a follow-up appointment with Neurology.  The patient is extensively counseled her diagnosis and treatment including all diagnostic, laboratory and physical exam findings.  The patient discharged in  good condition and directed to follow up with Neurology in the next 24-48 hours.             Clinical Impression:     1. Acute viral syndrome    2. Cough    3. Weakness    4. Lung nodules    5. Total bilirubin, elevated                                 Misha Young MD  05/21/19 [id number]

## 2019-05-23 LAB — BACTERIA THROAT CULT: NORMAL

## 2019-05-24 ENCOUNTER — OFFICE VISIT (OUTPATIENT)
Dept: INTERNAL MEDICINE | Facility: CLINIC | Age: 54
End: 2019-05-24
Attending: INTERNAL MEDICINE
Payer: COMMERCIAL

## 2019-05-24 VITALS
HEART RATE: 84 BPM | HEIGHT: 67 IN | WEIGHT: 112 LBS | DIASTOLIC BLOOD PRESSURE: 68 MMHG | OXYGEN SATURATION: 98 % | BODY MASS INDEX: 17.58 KG/M2 | SYSTOLIC BLOOD PRESSURE: 110 MMHG

## 2019-05-24 DIAGNOSIS — R05.9 COUGH: ICD-10-CM

## 2019-05-24 DIAGNOSIS — R50.9 FEVER, UNSPECIFIED FEVER CAUSE: ICD-10-CM

## 2019-05-24 DIAGNOSIS — K21.9 GASTROESOPHAGEAL REFLUX DISEASE, ESOPHAGITIS PRESENCE NOT SPECIFIED: Primary | ICD-10-CM

## 2019-05-24 DIAGNOSIS — J02.9 SORE THROAT: ICD-10-CM

## 2019-05-24 DIAGNOSIS — J45.909 ASTHMA, CURRENTLY DORMANT: ICD-10-CM

## 2019-05-24 PROCEDURE — 99214 OFFICE O/P EST MOD 30 MIN: CPT | Mod: 25,S$GLB,, | Performed by: INTERNAL MEDICINE

## 2019-05-24 PROCEDURE — 99214 PR OFFICE/OUTPT VISIT, EST, LEVL IV, 30-39 MIN: ICD-10-PCS | Mod: 25,S$GLB,, | Performed by: INTERNAL MEDICINE

## 2019-05-24 PROCEDURE — 96372 THER/PROPH/DIAG INJ SC/IM: CPT | Mod: S$GLB,,, | Performed by: INTERNAL MEDICINE

## 2019-05-24 PROCEDURE — 3008F BODY MASS INDEX DOCD: CPT | Mod: CPTII,S$GLB,, | Performed by: INTERNAL MEDICINE

## 2019-05-24 PROCEDURE — 96372 PR INJECTION,THERAP/PROPH/DIAG2ST, IM OR SUBCUT: ICD-10-PCS | Mod: S$GLB,,, | Performed by: INTERNAL MEDICINE

## 2019-05-24 PROCEDURE — 3008F PR BODY MASS INDEX (BMI) DOCUMENTED: ICD-10-PCS | Mod: CPTII,S$GLB,, | Performed by: INTERNAL MEDICINE

## 2019-05-24 RX ORDER — ALBUTEROL SULFATE 90 UG/1
2 AEROSOL, METERED RESPIRATORY (INHALATION) EVERY 6 HOURS PRN
Qty: 18 G | Refills: 5 | Status: SHIPPED | OUTPATIENT
Start: 2019-05-24 | End: 2020-11-10

## 2019-05-24 RX ORDER — TRIAMCINOLONE ACETONIDE 40 MG/ML
40 INJECTION, SUSPENSION INTRA-ARTICULAR; INTRAMUSCULAR ONCE
Status: COMPLETED | OUTPATIENT
Start: 2019-05-24 | End: 2019-05-24

## 2019-05-24 RX ORDER — METHYLPREDNISOLONE ACETATE 80 MG/ML
80 INJECTION, SUSPENSION INTRA-ARTICULAR; INTRALESIONAL; INTRAMUSCULAR; SOFT TISSUE ONCE
Status: COMPLETED | OUTPATIENT
Start: 2019-05-24 | End: 2019-05-24

## 2019-05-24 RX ADMIN — METHYLPREDNISOLONE ACETATE 80 MG: 80 INJECTION, SUSPENSION INTRA-ARTICULAR; INTRALESIONAL; INTRAMUSCULAR; SOFT TISSUE at 12:05

## 2019-05-24 RX ADMIN — TRIAMCINOLONE ACETONIDE 40 MG: 40 INJECTION, SUSPENSION INTRA-ARTICULAR; INTRAMUSCULAR at 12:05

## 2019-05-24 NOTE — PROGRESS NOTES
Subjective:       Patient ID: Hailey Sullivan is a 53 y.o. female.    Chief Complaint: Hospital Follow Up    She started feeling sick while at the beach 5 days ago.  She developed a cough and some shortness of breath along with a sore throat.  It progressively got worse and she was coughing up green sputum.  She went to the emergency room 2 days ago her temperature was 100.0°. Rapid strep test and strep culture were both negative.  CT of the chest showed some very small unchanged calcified nodules.  And labs were unremarkable.  Yesterday her T-max was 100.6°, this morning it was 99.0.  He has been using albuterol and salt water gargles feels better today.  She had some nausea and vomiting yesterday but she was able to eat a normal meal last night and this morning.    Review of Systems   Constitutional: Positive for unexpected weight change.   Respiratory: Positive for cough, chest tightness and shortness of breath.    Gastrointestinal: Positive for nausea and vomiting.       Objective:      Physical Exam   Constitutional: She appears well-developed and well-nourished.   HENT:   Head: Normocephalic.   Mouth/Throat: Oropharynx is clear and moist and mucous membranes are normal.   Eyes: Pupils are equal, round, and reactive to light.   Cardiovascular: Normal rate and regular rhythm. Exam reveals no friction rub.   Murmur heard.   Systolic murmur is present with a grade of 2/6.  Pulmonary/Chest: Effort normal.   Abdominal: Soft. Bowel sounds are normal. There is tenderness in the epigastric area. There is no rebound.   Neurological: She is alert.       Assessment:       1. Gastroesophageal reflux disease, esophagitis presence not specified    2. Cough    3. Fever, unspecified fever cause    4. Sore throat        Plan:       Per orders and D/C instructions.  Continue meds/diet for GERD, which is stable.     N,V, and soar throat are improved.  Steroid shot and Albuterol for asthma with cough.  She has an appt  with a new Neurologist soon.

## 2019-06-15 DIAGNOSIS — F41.9 ANXIETY: ICD-10-CM

## 2019-06-16 RX ORDER — ALPRAZOLAM 1 MG/1
TABLET ORAL
Qty: 30 TABLET | Refills: 0 | Status: SHIPPED | OUTPATIENT
Start: 2019-06-16 | End: 2019-07-25 | Stop reason: SDUPTHER

## 2019-07-25 DIAGNOSIS — F41.9 ANXIETY: ICD-10-CM

## 2019-07-25 RX ORDER — ALPRAZOLAM 1 MG/1
TABLET ORAL
Qty: 30 TABLET | Refills: 2 | Status: SHIPPED | OUTPATIENT
Start: 2019-07-25 | End: 2019-12-04 | Stop reason: SDUPTHER

## 2019-10-24 ENCOUNTER — OFFICE VISIT (OUTPATIENT)
Dept: INTERNAL MEDICINE | Facility: CLINIC | Age: 54
End: 2019-10-24
Attending: INTERNAL MEDICINE
Payer: COMMERCIAL

## 2019-10-24 VITALS
HEART RATE: 78 BPM | HEIGHT: 67 IN | DIASTOLIC BLOOD PRESSURE: 70 MMHG | BODY MASS INDEX: 17.58 KG/M2 | WEIGHT: 112 LBS | OXYGEN SATURATION: 98 % | SYSTOLIC BLOOD PRESSURE: 112 MMHG

## 2019-10-24 DIAGNOSIS — T78.40XA ALLERGIC REACTION, INITIAL ENCOUNTER: ICD-10-CM

## 2019-10-24 DIAGNOSIS — R21 RASH: Primary | ICD-10-CM

## 2019-10-24 PROCEDURE — 96372 THER/PROPH/DIAG INJ SC/IM: CPT | Mod: S$GLB,,, | Performed by: INTERNAL MEDICINE

## 2019-10-24 PROCEDURE — 99212 OFFICE O/P EST SF 10 MIN: CPT | Mod: 25,S$GLB,, | Performed by: INTERNAL MEDICINE

## 2019-10-24 PROCEDURE — 3008F BODY MASS INDEX DOCD: CPT | Mod: CPTII,S$GLB,, | Performed by: INTERNAL MEDICINE

## 2019-10-24 PROCEDURE — 96372 PR INJECTION,THERAP/PROPH/DIAG2ST, IM OR SUBCUT: ICD-10-PCS | Mod: S$GLB,,, | Performed by: INTERNAL MEDICINE

## 2019-10-24 PROCEDURE — 3008F PR BODY MASS INDEX (BMI) DOCUMENTED: ICD-10-PCS | Mod: CPTII,S$GLB,, | Performed by: INTERNAL MEDICINE

## 2019-10-24 PROCEDURE — 99212 PR OFFICE/OUTPT VISIT, EST, LEVL II, 10-19 MIN: ICD-10-PCS | Mod: 25,S$GLB,, | Performed by: INTERNAL MEDICINE

## 2019-10-24 RX ORDER — TRIAMCINOLONE ACETONIDE 40 MG/ML
40 INJECTION, SUSPENSION INTRA-ARTICULAR; INTRAMUSCULAR ONCE
Status: COMPLETED | OUTPATIENT
Start: 2019-10-24 | End: 2019-10-24

## 2019-10-24 RX ORDER — METHYLPREDNISOLONE ACETATE 80 MG/ML
80 INJECTION, SUSPENSION INTRA-ARTICULAR; INTRALESIONAL; INTRAMUSCULAR; SOFT TISSUE ONCE
Status: COMPLETED | OUTPATIENT
Start: 2019-10-24 | End: 2019-10-24

## 2019-10-24 RX ADMIN — METHYLPREDNISOLONE ACETATE 80 MG: 80 INJECTION, SUSPENSION INTRA-ARTICULAR; INTRALESIONAL; INTRAMUSCULAR; SOFT TISSUE at 01:10

## 2019-10-24 RX ADMIN — TRIAMCINOLONE ACETONIDE 40 MG: 40 INJECTION, SUSPENSION INTRA-ARTICULAR; INTRAMUSCULAR at 01:10

## 2019-10-24 NOTE — PROGRESS NOTES
Subjective:       Patient ID: Hailey Sullivan is a 53 y.o. female.    Chief Complaint: No chief complaint on file.    She showed up at the office during lunch with no appointment.  For a steroid shot.  She states that yesterday she accidentally took 2 Dexilant, and got a bit itchy around her neck last night.  This morning she took another Dexilant and now she feels like her throat and neck are itchy.  She has not used any new skin products or had any new exposure and she has not eaten anything this morning.      Review of Systems   Skin: Positive for rash.       Objective:      Physical Exam   Constitutional: She appears well-developed and well-nourished.   HENT:   Head: Normocephalic.   Mouth/Throat: Uvula is midline, oropharynx is clear and moist and mucous membranes are normal.   Eyes: Pupils are equal, round, and reactive to light.   Cardiovascular: Normal rate and regular rhythm. Exam reveals no friction rub.   Murmur heard.   Systolic murmur is present with a grade of 2/6.  Pulmonary/Chest: Effort normal.   Neurological: She is alert.       Assessment:       1. Rash    2. Allergic reaction, initial encounter        Plan:       Per orders and D/C instructions.  Zyrtec 10 mg was given in the office and a steroid shot for an allergic reaction of unclear cause.  She is not in any respiratory distress.

## 2019-12-04 DIAGNOSIS — F41.9 ANXIETY: ICD-10-CM

## 2019-12-04 RX ORDER — ALPRAZOLAM 1 MG/1
TABLET ORAL
Qty: 30 TABLET | Refills: 2 | Status: SHIPPED | OUTPATIENT
Start: 2019-12-04 | End: 2020-03-13

## 2020-02-13 NOTE — PATIENT INSTRUCTIONS
Take Amitriptyline 10 mg nightly.  Try Donnatal 1 tab as needed.  Get Echo next week.  
Implemented All Universal Safety Interventions:  [place] to call system. Call bell, personal items and telephone within reach. Instruct patient to call for assistance. Room bathroom lighting operational. Non-slip footwear when patient is off stretcher. Physically safe environment: no spills, clutter or unnecessary equipment. Stretcher in lowest position, wheels locked, appropriate side rails in place.

## 2020-03-12 DIAGNOSIS — F41.9 ANXIETY: ICD-10-CM

## 2020-03-13 RX ORDER — ALPRAZOLAM 1 MG/1
TABLET ORAL
Qty: 30 TABLET | Refills: 2 | Status: SHIPPED | OUTPATIENT
Start: 2020-03-13 | End: 2020-05-28 | Stop reason: SDUPTHER

## 2020-04-22 RX ORDER — EPINEPHRINE 0.3 MG/.3ML
1 INJECTION SUBCUTANEOUS ONCE
Qty: 2 DEVICE | Refills: 5 | OUTPATIENT
Start: 2020-04-22 | End: 2023-08-30

## 2020-05-12 DIAGNOSIS — R50.9 FEVER, UNSPECIFIED FEVER CAUSE: Primary | ICD-10-CM

## 2020-05-28 DIAGNOSIS — F41.9 ANXIETY: ICD-10-CM

## 2020-05-28 RX ORDER — ALPRAZOLAM 1 MG/1
TABLET ORAL
Qty: 30 TABLET | Refills: 0 | Status: SHIPPED | OUTPATIENT
Start: 2020-05-28 | End: 2020-08-24

## 2020-06-30 DIAGNOSIS — Z20.822 CLOSE EXPOSURE TO COVID-19 VIRUS: Primary | ICD-10-CM

## 2020-07-09 DIAGNOSIS — E78.9 DISORDER OF LIPID METABOLISM: ICD-10-CM

## 2020-07-09 DIAGNOSIS — D50.8 OTHER IRON DEFICIENCY ANEMIA: ICD-10-CM

## 2020-07-09 DIAGNOSIS — E03.9 HYPOTHYROIDISM, UNSPECIFIED TYPE: ICD-10-CM

## 2020-07-09 DIAGNOSIS — R79.89 OTHER SPECIFIED ABNORMAL FINDINGS OF BLOOD CHEMISTRY: ICD-10-CM

## 2020-07-09 DIAGNOSIS — Z00.00 ROUTINE ADULT HEALTH MAINTENANCE: Primary | ICD-10-CM

## 2020-07-09 DIAGNOSIS — D51.0 PERNICIOUS ANEMIA: ICD-10-CM

## 2020-07-09 DIAGNOSIS — E55.9 VITAMIN D DEFICIENCY: ICD-10-CM

## 2020-07-09 DIAGNOSIS — U07.1 COVID-19: ICD-10-CM

## 2020-09-24 DIAGNOSIS — Z12.39 BREAST CANCER SCREENING: ICD-10-CM

## 2020-11-10 ENCOUNTER — OFFICE VISIT (OUTPATIENT)
Dept: INTERNAL MEDICINE | Facility: CLINIC | Age: 55
End: 2020-11-10
Attending: INTERNAL MEDICINE
Payer: COMMERCIAL

## 2020-11-10 VITALS
OXYGEN SATURATION: 97 % | HEIGHT: 67 IN | DIASTOLIC BLOOD PRESSURE: 62 MMHG | SYSTOLIC BLOOD PRESSURE: 98 MMHG | WEIGHT: 115 LBS | HEART RATE: 72 BPM | BODY MASS INDEX: 18.05 KG/M2

## 2020-11-10 DIAGNOSIS — Z13.31 SCREENING FOR DEPRESSION: ICD-10-CM

## 2020-11-10 DIAGNOSIS — Q23.1 BICUSPID AORTIC VALVE: ICD-10-CM

## 2020-11-10 DIAGNOSIS — Z13.39 SCREENING FOR ALCOHOLISM: ICD-10-CM

## 2020-11-10 DIAGNOSIS — J45.909 ASTHMA, CURRENTLY DORMANT: ICD-10-CM

## 2020-11-10 DIAGNOSIS — I08.0 MILD MITRAL AND AORTIC REGURGITATION: Primary | ICD-10-CM

## 2020-11-10 DIAGNOSIS — Z12.39 BREAST CANCER SCREENING: ICD-10-CM

## 2020-11-10 PROCEDURE — 99396 PREV VISIT EST AGE 40-64: CPT | Mod: 25,S$GLB,, | Performed by: INTERNAL MEDICINE

## 2020-11-10 PROCEDURE — G0442 ANNUAL ALCOHOL SCREEN 15 MIN: HCPCS | Mod: S$GLB,,, | Performed by: INTERNAL MEDICINE

## 2020-11-10 PROCEDURE — 3017F COLORECTAL CA SCREEN DOC REV: CPT | Mod: S$GLB,,, | Performed by: INTERNAL MEDICINE

## 2020-11-10 PROCEDURE — 3017F PR COLORECTAL CANCER SCREEN RESULTS DOCUMENT/REVIEW: ICD-10-PCS | Mod: S$GLB,,, | Performed by: INTERNAL MEDICINE

## 2020-11-10 PROCEDURE — 3008F BODY MASS INDEX DOCD: CPT | Mod: CPTII,S$GLB,, | Performed by: INTERNAL MEDICINE

## 2020-11-10 PROCEDURE — G0442 PR  ALCOHOL SCREENING: ICD-10-PCS | Mod: S$GLB,,, | Performed by: INTERNAL MEDICINE

## 2020-11-10 PROCEDURE — 1160F PR REVIEW ALL MEDS BY PRESCRIBER/CLIN PHARMACIST DOCUMENTED: ICD-10-PCS | Mod: S$GLB,,, | Performed by: INTERNAL MEDICINE

## 2020-11-10 PROCEDURE — 99396 PR PREVENTIVE VISIT,EST,40-64: ICD-10-PCS | Mod: 25,S$GLB,, | Performed by: INTERNAL MEDICINE

## 2020-11-10 PROCEDURE — 1160F RVW MEDS BY RX/DR IN RCRD: CPT | Mod: S$GLB,,, | Performed by: INTERNAL MEDICINE

## 2020-11-10 PROCEDURE — 3008F PR BODY MASS INDEX (BMI) DOCUMENTED: ICD-10-PCS | Mod: CPTII,S$GLB,, | Performed by: INTERNAL MEDICINE

## 2020-11-10 NOTE — PROGRESS NOTES
Subjective:       Patient ID: Hailey Sullivan is a 55 y.o. female.    Chief Complaint: Annual Exam (stoped taking Dexilant)    She discontinued Elavil and Dexilant.  She is able to manage her GERD with diet.      Adult Wellness Exam:    Mental Conditions: None  Depression Risk Factors: None  BMI: See Vital signs   Colon screen:    See Health Maintenance Report      Females: Mammogram and PAP: per Gyn                                 Vaccines (Flu, Adacel, Shingrix): See Health Maintenance Report  Routine labs (Cholesterol, Glucose/Hgb A1C, and TSH):ordered.     The patient's current health status is: Good   Patient was educated on all medical problems and routine health maintanence. See Patient Instructions.                               Review of Systems   Constitutional: Negative.    Respiratory: Negative for shortness of breath.    Cardiovascular: Negative for chest pain.         Objective:      Physical Exam  Constitutional:       Appearance: She is well-developed.   HENT:      Head: Normocephalic.      Mouth/Throat:      Pharynx: Uvula midline.   Eyes:      Pupils: Pupils are equal, round, and reactive to light.   Cardiovascular:      Rate and Rhythm: Normal rate and regular rhythm.      Heart sounds: Murmur present. Systolic murmur present with a grade of 1/6. No friction rub.   Pulmonary:      Effort: Pulmonary effort is normal.   Neurological:      Mental Status: She is alert.         Assessment:       1. Mild mitral and aortic regurgitation    2. Asthma, currently dormant    3. Bicuspid aortic valve    4. Breast cancer screening    5. Screening for alcoholism    6. Screening for depression        Plan:       Per orders and D/C instructions.  Continue meds/diet for asthma and GERD, which are stable.     she already has lab orders for blood work.  Echocardiogram to evaluate mitral and aortic regurg and bicuspid aortic valve.    Screening: The patient was screened for depression with the PHQ2  questionnaire and possible health consequences were discussed with the patient, who understands (15 minutes spent). The patient was screened for the misuse of alcohol, by asking the number of drinks per average week, and if pt has had more than 4 drinks (more than 3 for women and elderly) in 1 day within the past year. The health and legal consequences of misuse were discussed (15 minutes spent). The patient was screened for obesity (BMI>30), If the current BMI > 30, then the possible consequences of obesity, as well as the benefits of diet, exercise, and weight loss were discussed. Any behavioral risks were identified, and methods to achieve appropriate treatment goals were discussed (15 minutes spent).

## 2020-11-13 RX ORDER — TOBRAMYCIN AND DEXAMETHASONE 3; 1 MG/ML; MG/ML
1 SUSPENSION/ DROPS OPHTHALMIC EVERY 6 HOURS
Qty: 2.5 ML | Refills: 0 | Status: SHIPPED | OUTPATIENT
Start: 2020-11-13 | End: 2021-10-27

## 2020-12-08 ENCOUNTER — LAB VISIT (OUTPATIENT)
Dept: LAB | Facility: OTHER | Age: 55
End: 2020-12-08
Attending: INTERNAL MEDICINE
Payer: COMMERCIAL

## 2020-12-08 ENCOUNTER — CLINICAL SUPPORT (OUTPATIENT)
Dept: INTERNAL MEDICINE | Facility: CLINIC | Age: 55
End: 2020-12-08
Attending: INTERNAL MEDICINE
Payer: COMMERCIAL

## 2020-12-08 DIAGNOSIS — Z00.00 ROUTINE ADULT HEALTH MAINTENANCE: ICD-10-CM

## 2020-12-08 DIAGNOSIS — R00.2 HEART PALPITATIONS: Primary | ICD-10-CM

## 2020-12-08 DIAGNOSIS — I08.0 MILD MITRAL AND AORTIC REGURGITATION: ICD-10-CM

## 2020-12-08 DIAGNOSIS — D50.8 OTHER IRON DEFICIENCY ANEMIA: ICD-10-CM

## 2020-12-08 DIAGNOSIS — U07.1 COVID-19: ICD-10-CM

## 2020-12-08 DIAGNOSIS — Q23.1 BICUSPID AORTIC VALVE: ICD-10-CM

## 2020-12-08 DIAGNOSIS — E55.9 VITAMIN D DEFICIENCY: ICD-10-CM

## 2020-12-08 DIAGNOSIS — D51.0 PERNICIOUS ANEMIA: ICD-10-CM

## 2020-12-08 DIAGNOSIS — E78.9 DISORDER OF LIPID METABOLISM: ICD-10-CM

## 2020-12-08 DIAGNOSIS — R79.89 OTHER SPECIFIED ABNORMAL FINDINGS OF BLOOD CHEMISTRY: ICD-10-CM

## 2020-12-08 DIAGNOSIS — E03.9 HYPOTHYROIDISM, UNSPECIFIED TYPE: ICD-10-CM

## 2020-12-08 LAB
ALBUMIN SERPL BCP-MCNC: 4.3 G/DL (ref 3.5–5.2)
ALP SERPL-CCNC: 79 U/L (ref 55–135)
ALT SERPL W/O P-5'-P-CCNC: 14 U/L (ref 10–44)
ANION GAP SERPL CALC-SCNC: 3 MMOL/L (ref 8–16)
AST SERPL-CCNC: 23 U/L (ref 10–40)
BASOPHILS # BLD AUTO: 0.02 K/UL (ref 0–0.2)
BASOPHILS NFR BLD: 0.4 % (ref 0–1.9)
BILIRUB SERPL-MCNC: 0.4 MG/DL (ref 0.1–1)
BUN SERPL-MCNC: 11 MG/DL (ref 6–20)
CALCIUM SERPL-MCNC: 9.6 MG/DL (ref 8.7–10.5)
CHLORIDE SERPL-SCNC: 106 MMOL/L (ref 95–110)
CO2 SERPL-SCNC: 33 MMOL/L (ref 23–29)
CREAT SERPL-MCNC: 0.8 MG/DL (ref 0.5–1.4)
DIFFERENTIAL METHOD: ABNORMAL
EOSINOPHIL # BLD AUTO: 0.2 K/UL (ref 0–0.5)
EOSINOPHIL NFR BLD: 3.8 % (ref 0–8)
ERYTHROCYTE [DISTWIDTH] IN BLOOD BY AUTOMATED COUNT: 11.7 % (ref 11.5–14.5)
EST. GFR  (AFRICAN AMERICAN): >60 ML/MIN/1.73 M^2
EST. GFR  (NON AFRICAN AMERICAN): >60 ML/MIN/1.73 M^2
GLUCOSE SERPL-MCNC: 89 MG/DL (ref 70–110)
HCT VFR BLD AUTO: 37.8 % (ref 37–48.5)
HGB BLD-MCNC: 12.5 G/DL (ref 12–16)
IMM GRANULOCYTES # BLD AUTO: 0.01 K/UL (ref 0–0.04)
IMM GRANULOCYTES NFR BLD AUTO: 0.2 % (ref 0–0.5)
LYMPHOCYTES # BLD AUTO: 1.5 K/UL (ref 1–4.8)
LYMPHOCYTES NFR BLD: 28.2 % (ref 18–48)
MCH RBC QN AUTO: 30.6 PG (ref 27–31)
MCHC RBC AUTO-ENTMCNC: 33.1 G/DL (ref 32–36)
MCV RBC AUTO: 93 FL (ref 82–98)
MONOCYTES # BLD AUTO: 0.3 K/UL (ref 0.3–1)
MONOCYTES NFR BLD: 6.5 % (ref 4–15)
NEUTROPHILS # BLD AUTO: 3.2 K/UL (ref 1.8–7.7)
NEUTROPHILS NFR BLD: 60.9 % (ref 38–73)
NRBC BLD-RTO: 0 /100 WBC
PLATELET # BLD AUTO: 165 K/UL (ref 150–350)
PMV BLD AUTO: 9 FL (ref 9.2–12.9)
POTASSIUM SERPL-SCNC: 3.8 MMOL/L (ref 3.5–5.1)
PROT SERPL-MCNC: 7.4 G/DL (ref 6–8.4)
RBC # BLD AUTO: 4.08 M/UL (ref 4–5.4)
SODIUM SERPL-SCNC: 142 MMOL/L (ref 136–145)
TSH SERPL DL<=0.005 MIU/L-ACNC: 3.75 UIU/ML (ref 0.4–4)
WBC # BLD AUTO: 5.24 K/UL (ref 3.9–12.7)

## 2020-12-08 PROCEDURE — 86769 SARS-COV-2 COVID-19 ANTIBODY: CPT

## 2020-12-08 PROCEDURE — 80061 LIPID PANEL: CPT

## 2020-12-08 PROCEDURE — 83036 HEMOGLOBIN GLYCOSYLATED A1C: CPT

## 2020-12-08 PROCEDURE — 80053 COMPREHEN METABOLIC PANEL: CPT

## 2020-12-08 PROCEDURE — 93000 ELECTROCARDIOGRAM COMPLETE: CPT | Mod: S$GLB,,, | Performed by: INTERNAL MEDICINE

## 2020-12-08 PROCEDURE — 85025 COMPLETE CBC W/AUTO DIFF WBC: CPT

## 2020-12-08 PROCEDURE — 93306 TTE W/DOPPLER COMPLETE: CPT | Mod: S$GLB,,, | Performed by: INTERNAL MEDICINE

## 2020-12-08 PROCEDURE — 82607 VITAMIN B-12: CPT

## 2020-12-08 PROCEDURE — 93000 PR ELECTROCARDIOGRAM, COMPLETE: ICD-10-PCS | Mod: S$GLB,,, | Performed by: INTERNAL MEDICINE

## 2020-12-08 PROCEDURE — 93306 PR ECHO HEART XTHORACIC,COMPLETE W DOPPLER: ICD-10-PCS | Mod: S$GLB,,, | Performed by: INTERNAL MEDICINE

## 2020-12-08 PROCEDURE — 82306 VITAMIN D 25 HYDROXY: CPT

## 2020-12-08 PROCEDURE — 84443 ASSAY THYROID STIM HORMONE: CPT

## 2020-12-09 PROBLEM — I51.89 GRADE I DIASTOLIC DYSFUNCTION: Status: ACTIVE | Noted: 2020-12-09

## 2020-12-09 LAB
25(OH)D3+25(OH)D2 SERPL-MCNC: 19 NG/ML (ref 30–96)
CHOLEST SERPL-MCNC: 170 MG/DL (ref 120–199)
CHOLEST/HDLC SERPL: 3.5 {RATIO} (ref 2–5)
ESTIMATED AVG GLUCOSE: 91 MG/DL (ref 68–131)
HBA1C MFR BLD HPLC: 4.8 % (ref 4–5.6)
HDLC SERPL-MCNC: 49 MG/DL (ref 40–75)
HDLC SERPL: 28.8 % (ref 20–50)
LDLC SERPL CALC-MCNC: 95.6 MG/DL (ref 63–159)
NONHDLC SERPL-MCNC: 121 MG/DL
SARS-COV-2 IGG SERPLBLD QL IA.RAPID: NEGATIVE
TRIGL SERPL-MCNC: 127 MG/DL (ref 30–150)
VIT B12 SERPL-MCNC: 315 PG/ML (ref 210–950)

## 2021-07-23 DIAGNOSIS — F41.9 ANXIETY: ICD-10-CM

## 2021-07-23 RX ORDER — ALPRAZOLAM 1 MG/1
TABLET ORAL
Qty: 30 TABLET | Refills: 3 | Status: SHIPPED | OUTPATIENT
Start: 2021-07-23 | End: 2022-01-09

## 2021-08-02 DIAGNOSIS — R05.9 COUGH: ICD-10-CM

## 2021-08-02 RX ORDER — PROMETHAZINE HYDROCHLORIDE AND DEXTROMETHORPHAN HYDROBROMIDE 6.25; 15 MG/5ML; MG/5ML
5 SYRUP ORAL NIGHTLY PRN
Qty: 180 ML | Refills: 0 | Status: SHIPPED | OUTPATIENT
Start: 2021-08-02 | End: 2021-11-15

## 2021-08-12 DIAGNOSIS — Z12.31 VISIT FOR SCREENING MAMMOGRAM: Primary | ICD-10-CM

## 2021-10-27 ENCOUNTER — OFFICE VISIT (OUTPATIENT)
Dept: INTERNAL MEDICINE | Facility: CLINIC | Age: 56
End: 2021-10-27
Attending: INTERNAL MEDICINE
Payer: COMMERCIAL

## 2021-10-27 VITALS
OXYGEN SATURATION: 98 % | HEIGHT: 67 IN | WEIGHT: 117 LBS | BODY MASS INDEX: 18.36 KG/M2 | DIASTOLIC BLOOD PRESSURE: 70 MMHG | HEART RATE: 67 BPM | SYSTOLIC BLOOD PRESSURE: 112 MMHG

## 2021-10-27 DIAGNOSIS — K04.7 TOOTH ABSCESS: ICD-10-CM

## 2021-10-27 DIAGNOSIS — D51.0 PERNICIOUS ANEMIA: ICD-10-CM

## 2021-10-27 DIAGNOSIS — R79.89 OTHER SPECIFIED ABNORMAL FINDINGS OF BLOOD CHEMISTRY: ICD-10-CM

## 2021-10-27 DIAGNOSIS — Z00.00 ROUTINE ADULT HEALTH MAINTENANCE: Primary | ICD-10-CM

## 2021-10-27 DIAGNOSIS — R07.9 CHEST PAIN, UNSPECIFIED TYPE: ICD-10-CM

## 2021-10-27 DIAGNOSIS — E78.9 DISORDER OF LIPID METABOLISM: ICD-10-CM

## 2021-10-27 DIAGNOSIS — Q23.1 BICUSPID AORTIC VALVE: ICD-10-CM

## 2021-10-27 DIAGNOSIS — E03.9 HYPOTHYROIDISM, UNSPECIFIED TYPE: ICD-10-CM

## 2021-10-27 DIAGNOSIS — I08.0 MILD MITRAL AND AORTIC REGURGITATION: Primary | ICD-10-CM

## 2021-10-27 DIAGNOSIS — E55.9 VITAMIN D DEFICIENCY: ICD-10-CM

## 2021-10-27 DIAGNOSIS — D50.8 OTHER IRON DEFICIENCY ANEMIA: ICD-10-CM

## 2021-10-27 PROCEDURE — 3074F SYST BP LT 130 MM HG: CPT | Mod: CPTII,S$GLB,, | Performed by: INTERNAL MEDICINE

## 2021-10-27 PROCEDURE — 1160F RVW MEDS BY RX/DR IN RCRD: CPT | Mod: CPTII,S$GLB,, | Performed by: INTERNAL MEDICINE

## 2021-10-27 PROCEDURE — 3008F PR BODY MASS INDEX (BMI) DOCUMENTED: ICD-10-PCS | Mod: CPTII,S$GLB,, | Performed by: INTERNAL MEDICINE

## 2021-10-27 PROCEDURE — 96372 PR INJECTION,THERAP/PROPH/DIAG2ST, IM OR SUBCUT: ICD-10-PCS | Mod: S$GLB,,, | Performed by: INTERNAL MEDICINE

## 2021-10-27 PROCEDURE — 1159F PR MEDICATION LIST DOCUMENTED IN MEDICAL RECORD: ICD-10-PCS | Mod: CPTII,S$GLB,, | Performed by: INTERNAL MEDICINE

## 2021-10-27 PROCEDURE — 96372 THER/PROPH/DIAG INJ SC/IM: CPT | Mod: S$GLB,,, | Performed by: INTERNAL MEDICINE

## 2021-10-27 PROCEDURE — 99214 PR OFFICE/OUTPT VISIT, EST, LEVL IV, 30-39 MIN: ICD-10-PCS | Mod: 25,S$GLB,, | Performed by: INTERNAL MEDICINE

## 2021-10-27 PROCEDURE — 3078F DIAST BP <80 MM HG: CPT | Mod: CPTII,S$GLB,, | Performed by: INTERNAL MEDICINE

## 2021-10-27 PROCEDURE — 3078F PR MOST RECENT DIASTOLIC BLOOD PRESSURE < 80 MM HG: ICD-10-PCS | Mod: CPTII,S$GLB,, | Performed by: INTERNAL MEDICINE

## 2021-10-27 PROCEDURE — 3074F PR MOST RECENT SYSTOLIC BLOOD PRESSURE < 130 MM HG: ICD-10-PCS | Mod: CPTII,S$GLB,, | Performed by: INTERNAL MEDICINE

## 2021-10-27 PROCEDURE — 1159F MED LIST DOCD IN RCRD: CPT | Mod: CPTII,S$GLB,, | Performed by: INTERNAL MEDICINE

## 2021-10-27 PROCEDURE — 99214 OFFICE O/P EST MOD 30 MIN: CPT | Mod: 25,S$GLB,, | Performed by: INTERNAL MEDICINE

## 2021-10-27 PROCEDURE — 1160F PR REVIEW ALL MEDS BY PRESCRIBER/CLIN PHARMACIST DOCUMENTED: ICD-10-PCS | Mod: CPTII,S$GLB,, | Performed by: INTERNAL MEDICINE

## 2021-10-27 PROCEDURE — 3008F BODY MASS INDEX DOCD: CPT | Mod: CPTII,S$GLB,, | Performed by: INTERNAL MEDICINE

## 2021-10-27 RX ORDER — CEFTRIAXONE 1 G/1
1 INJECTION, POWDER, FOR SOLUTION INTRAMUSCULAR; INTRAVENOUS
Status: COMPLETED | OUTPATIENT
Start: 2021-10-27 | End: 2021-10-27

## 2021-10-27 RX ADMIN — CEFTRIAXONE 1 G: 1 INJECTION, POWDER, FOR SOLUTION INTRAMUSCULAR; INTRAVENOUS at 01:10

## 2021-11-15 ENCOUNTER — HOSPITAL ENCOUNTER (OUTPATIENT)
Dept: RADIOLOGY | Facility: OTHER | Age: 56
Discharge: HOME | End: 2021-11-15
Attending: INTERNAL MEDICINE
Payer: COMMERCIAL

## 2021-11-15 ENCOUNTER — HOSPITAL ENCOUNTER (EMERGENCY)
Facility: OTHER | Age: 56
Discharge: LEFT BEFORE DISCHARGE | End: 2021-11-15
Attending: INTERNAL MEDICINE
Payer: COMMERCIAL

## 2021-11-15 ENCOUNTER — OFFICE VISIT (OUTPATIENT)
Dept: INTERNAL MEDICINE | Facility: CLINIC | Age: 56
End: 2021-11-15
Attending: INTERNAL MEDICINE
Payer: COMMERCIAL

## 2021-11-15 VITALS
DIASTOLIC BLOOD PRESSURE: 82 MMHG | WEIGHT: 117 LBS | OXYGEN SATURATION: 97 % | HEIGHT: 67 IN | SYSTOLIC BLOOD PRESSURE: 142 MMHG | BODY MASS INDEX: 18.36 KG/M2 | HEART RATE: 53 BPM

## 2021-11-15 DIAGNOSIS — M54.2 NECK PAIN: ICD-10-CM

## 2021-11-15 DIAGNOSIS — M79.601 PAIN OF RIGHT UPPER EXTREMITY: ICD-10-CM

## 2021-11-15 DIAGNOSIS — R11.0 NAUSEA: ICD-10-CM

## 2021-11-15 DIAGNOSIS — M54.2 NECK PAIN: Primary | ICD-10-CM
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
## 2021-11-15 DIAGNOSIS — R42 DIZZY SPELLS: ICD-10-CM

## 2021-11-15 DIAGNOSIS — M54.9 BACK PAIN, UNSPECIFIED BACK LOCATION, UNSPECIFIED BACK PAIN LATERALITY, UNSPECIFIED CHRONICITY: ICD-10-CM

## 2021-11-15 PROCEDURE — 3008F BODY MASS INDEX DOCD: CPT | Mod: CPTII,S$GLB,, | Performed by: INTERNAL MEDICINE

## 2021-11-15 PROCEDURE — 72080 XR THORACOLUMBAR SPINE AP LATERAL: ICD-10-PCS | Mod: 26,,, | Performed by: RADIOLOGY

## 2021-11-15 PROCEDURE — 73090 XR FOREARM RIGHT: ICD-10-PCS | Mod: 26,RT,, | Performed by: RADIOLOGY

## 2021-11-15 PROCEDURE — 3077F SYST BP >= 140 MM HG: CPT | Mod: CPTII,S$GLB,, | Performed by: INTERNAL MEDICINE

## 2021-11-15 PROCEDURE — 99900041 HC LEFT WITHOUT BEING SEEN- EMERGENCY

## 2021-11-15 PROCEDURE — 3077F PR MOST RECENT SYSTOLIC BLOOD PRESSURE >= 140 MM HG: ICD-10-PCS | Mod: CPTII,S$GLB,, | Performed by: INTERNAL MEDICINE

## 2021-11-15 PROCEDURE — 3079F PR MOST RECENT DIASTOLIC BLOOD PRESSURE 80-89 MM HG: ICD-10-PCS | Mod: CPTII,S$GLB,, | Performed by: INTERNAL MEDICINE

## 2021-11-15 PROCEDURE — 72080 X-RAY EXAM THORACOLMB 2/> VW: CPT | Mod: TC

## 2021-11-15 PROCEDURE — 1159F MED LIST DOCD IN RCRD: CPT | Mod: CPTII,S$GLB,, | Performed by: INTERNAL MEDICINE

## 2021-11-15 PROCEDURE — 3008F PR BODY MASS INDEX (BMI) DOCUMENTED: ICD-10-PCS | Mod: CPTII,S$GLB,, | Performed by: INTERNAL MEDICINE

## 2021-11-15 PROCEDURE — 3079F DIAST BP 80-89 MM HG: CPT | Mod: CPTII,S$GLB,, | Performed by: INTERNAL MEDICINE

## 2021-11-15 PROCEDURE — 73090 X-RAY EXAM OF FOREARM: CPT | Mod: TC,FY,RT

## 2021-11-15 PROCEDURE — 72050 XR CERVICAL SPINE COMPLETE 5 VIEW: ICD-10-PCS | Mod: 26,,, | Performed by: RADIOLOGY

## 2021-11-15 PROCEDURE — 1160F PR REVIEW ALL MEDS BY PRESCRIBER/CLIN PHARMACIST DOCUMENTED: ICD-10-PCS | Mod: CPTII,S$GLB,, | Performed by: INTERNAL MEDICINE

## 2021-11-15 PROCEDURE — 73090 X-RAY EXAM OF FOREARM: CPT | Mod: 26,RT,, | Performed by: RADIOLOGY

## 2021-11-15 PROCEDURE — 72050 X-RAY EXAM NECK SPINE 4/5VWS: CPT | Mod: 26,,, | Performed by: RADIOLOGY

## 2021-11-15 PROCEDURE — 99214 OFFICE O/P EST MOD 30 MIN: CPT | Mod: S$GLB,,, | Performed by: INTERNAL MEDICINE

## 2021-11-15 PROCEDURE — 1160F RVW MEDS BY RX/DR IN RCRD: CPT | Mod: CPTII,S$GLB,, | Performed by: INTERNAL MEDICINE

## 2021-11-15 PROCEDURE — 99214 PR OFFICE/OUTPT VISIT, EST, LEVL IV, 30-39 MIN: ICD-10-PCS | Mod: S$GLB,,, | Performed by: INTERNAL MEDICINE

## 2021-11-15 PROCEDURE — 1159F PR MEDICATION LIST DOCUMENTED IN MEDICAL RECORD: ICD-10-PCS | Mod: CPTII,S$GLB,, | Performed by: INTERNAL MEDICINE

## 2021-11-15 PROCEDURE — 72080 X-RAY EXAM THORACOLMB 2/> VW: CPT | Mod: 26,,, | Performed by: RADIOLOGY

## 2021-11-15 PROCEDURE — 72050 X-RAY EXAM NECK SPINE 4/5VWS: CPT | Mod: TC,FY

## 2021-11-15 RX ORDER — MELOXICAM 7.5 MG/1
7.5 TABLET ORAL DAILY PRN
Qty: 30 TABLET | Refills: 0 | Status: SHIPPED | OUTPATIENT
Start: 2021-11-15 | End: 2021-12-12

## 2021-11-15 RX ORDER — DIAZEPAM 5 MG/1
2.5 TABLET ORAL EVERY 12 HOURS PRN
Qty: 15 TABLET | Refills: 0 | Status: SHIPPED | OUTPATIENT
Start: 2021-11-15 | End: 2022-06-10

## 2021-11-15 RX ORDER — ONDANSETRON 8 MG/1
TABLET, ORALLY DISINTEGRATING ORAL
Qty: 15 TABLET | Refills: 0 | Status: SHIPPED | OUTPATIENT
Start: 2021-11-15 | End: 2023-02-24 | Stop reason: SDUPTHER

## 2021-12-21 ENCOUNTER — PATIENT MESSAGE (OUTPATIENT)
Dept: NEUROLOGY | Facility: CLINIC | Age: 56
End: 2021-12-21
Payer: COMMERCIAL

## 2022-02-23 DIAGNOSIS — D84.9 IMMUNOSUPPRESSED STATUS: ICD-10-CM

## 2022-02-28 ENCOUNTER — PATIENT MESSAGE (OUTPATIENT)
Dept: PSYCHIATRY | Facility: CLINIC | Age: 57
End: 2022-02-28
Payer: COMMERCIAL

## 2022-03-14 ENCOUNTER — HOSPITAL ENCOUNTER (OUTPATIENT)
Dept: RADIOLOGY | Facility: OTHER | Age: 57
Discharge: HOME | End: 2022-03-14
Attending: INTERNAL MEDICINE
Payer: COMMERCIAL

## 2022-03-14 DIAGNOSIS — Z12.31 VISIT FOR SCREENING MAMMOGRAM: ICD-10-CM

## 2022-03-14 DIAGNOSIS — N63.10 LUMP OF BREAST, RIGHT: ICD-10-CM

## 2022-03-14 DIAGNOSIS — N63.20 LUMP OF BREAST, LEFT: Primary | ICD-10-CM

## 2022-03-14 DIAGNOSIS — N63.20 LUMP OF BREAST, LEFT: ICD-10-CM

## 2022-03-14 PROCEDURE — 77066 MAMMO DIGITAL DIAGNOSTIC BILAT WITH TOMO: ICD-10-PCS | Mod: 26,,, | Performed by: RADIOLOGY

## 2022-03-14 PROCEDURE — 76642 US BREAST RIGHT LIMITED: ICD-10-PCS | Mod: 26,RT,, | Performed by: RADIOLOGY

## 2022-03-14 PROCEDURE — 77066 DX MAMMO INCL CAD BI: CPT | Mod: TC

## 2022-03-14 PROCEDURE — 76642 ULTRASOUND BREAST LIMITED: CPT | Mod: TC,RT

## 2022-03-14 PROCEDURE — 77062 BREAST TOMOSYNTHESIS BI: CPT | Mod: 26,,, | Performed by: RADIOLOGY

## 2022-03-14 PROCEDURE — 76642 ULTRASOUND BREAST LIMITED: CPT | Mod: 26,RT,, | Performed by: RADIOLOGY

## 2022-03-14 PROCEDURE — 77062 MAMMO DIGITAL DIAGNOSTIC BILAT WITH TOMO: ICD-10-PCS | Mod: 26,,, | Performed by: RADIOLOGY

## 2022-03-14 PROCEDURE — 77066 DX MAMMO INCL CAD BI: CPT | Mod: 26,,, | Performed by: RADIOLOGY

## 2022-06-24 ENCOUNTER — PATIENT MESSAGE (OUTPATIENT)
Dept: PSYCHIATRY | Facility: CLINIC | Age: 57
End: 2022-06-24
Payer: COMMERCIAL

## 2022-07-18 ENCOUNTER — OFFICE VISIT (OUTPATIENT)
Dept: INTERNAL MEDICINE | Facility: CLINIC | Age: 57
End: 2022-07-18
Attending: INTERNAL MEDICINE
Payer: COMMERCIAL

## 2022-07-18 DIAGNOSIS — N34.2 INFECTIVE URETHRITIS: Primary | ICD-10-CM

## 2022-07-18 PROCEDURE — 1160F PR REVIEW ALL MEDS BY PRESCRIBER/CLIN PHARMACIST DOCUMENTED: ICD-10-PCS | Mod: CPTII,S$GLB,, | Performed by: INTERNAL MEDICINE

## 2022-07-18 PROCEDURE — 99213 PR OFFICE/OUTPT VISIT, EST, LEVL III, 20-29 MIN: ICD-10-PCS | Mod: 95,S$GLB,, | Performed by: INTERNAL MEDICINE

## 2022-07-18 PROCEDURE — 1159F MED LIST DOCD IN RCRD: CPT | Mod: CPTII,S$GLB,, | Performed by: INTERNAL MEDICINE

## 2022-07-18 PROCEDURE — 1159F PR MEDICATION LIST DOCUMENTED IN MEDICAL RECORD: ICD-10-PCS | Mod: CPTII,S$GLB,, | Performed by: INTERNAL MEDICINE

## 2022-07-18 PROCEDURE — 99213 OFFICE O/P EST LOW 20 MIN: CPT | Mod: 95,S$GLB,, | Performed by: INTERNAL MEDICINE

## 2022-07-18 PROCEDURE — 1160F RVW MEDS BY RX/DR IN RCRD: CPT | Mod: CPTII,S$GLB,, | Performed by: INTERNAL MEDICINE

## 2022-07-18 RX ORDER — AZITHROMYCIN 250 MG/1
TABLET, FILM COATED ORAL
Qty: 4 TABLET | Refills: 0 | Status: SHIPPED | OUTPATIENT
Start: 2022-07-18 | End: 2022-11-21

## 2022-07-18 NOTE — PROGRESS NOTES
Telemedicine Visit    The patient verbally consented to proceed with a telemedicine visit, following discussion of the options of face-to-face or telemedicine visit. The telemedicine visit was completed with real-time interactive audio and video using [email or web address] without complication.    The visit was conducted with a limited physical exam (visual exam), and was medically appropriate to meet the patient's needs.    The patient is at their home.  The patient appeared to be in the usual physical state, with normal responses and affect.  I did not notice any abnormal skin tone or abnormal breathing patterns.    I requested that the patient contact my office at [phone] to make an appointment for routine follow-up care, unless more specific instructions were given under A/P.    Chief Complaint: burning with urination    HPI:  She does not want to take doxycycline because she going on vacation this week and will be in the sun all day for several days.  She did have some hives from erythromycin in the past, but believes she can take azithromycin.        A/P:  Azithromycin 1 g with Benadryl 50 mg and a full meal for urethritis

## 2022-11-19 ENCOUNTER — NURSE TRIAGE (OUTPATIENT)
Dept: ADMINISTRATIVE | Facility: CLINIC | Age: 57
End: 2022-11-19
Payer: COMMERCIAL

## 2022-11-19 ENCOUNTER — HOSPITAL ENCOUNTER (EMERGENCY)
Facility: HOSPITAL | Age: 57
Discharge: HOME | End: 2022-11-19
Attending: EMERGENCY MEDICINE
Payer: COMMERCIAL

## 2022-11-19 VITALS
TEMPERATURE: 98 F | RESPIRATION RATE: 18 BRPM | SYSTOLIC BLOOD PRESSURE: 139 MMHG | BODY MASS INDEX: 20.4 KG/M2 | OXYGEN SATURATION: 99 % | WEIGHT: 130 LBS | HEIGHT: 67 IN | HEART RATE: 82 BPM | DIASTOLIC BLOOD PRESSURE: 95 MMHG

## 2022-11-19 DIAGNOSIS — J06.9 VIRAL URI WITH COUGH: Primary | ICD-10-CM

## 2022-11-19 LAB
INFLUENZA A, MOLECULAR: NEGATIVE
INFLUENZA B, MOLECULAR: NEGATIVE
SARS-COV-2 RDRP RESP QL NAA+PROBE: NEGATIVE
SPECIMEN SOURCE: NORMAL

## 2022-11-19 PROCEDURE — 25000242 PHARM REV CODE 250 ALT 637 W/ HCPCS: Performed by: EMERGENCY MEDICINE

## 2022-11-19 PROCEDURE — 99283 EMERGENCY DEPT VISIT LOW MDM: CPT | Mod: 25

## 2022-11-19 PROCEDURE — 87502 INFLUENZA DNA AMP PROBE: CPT | Performed by: EMERGENCY MEDICINE

## 2022-11-19 PROCEDURE — 94640 AIRWAY INHALATION TREATMENT: CPT

## 2022-11-19 PROCEDURE — 99284 EMERGENCY DEPT VISIT MOD MDM: CPT | Mod: CS,,, | Performed by: EMERGENCY MEDICINE

## 2022-11-19 PROCEDURE — U0002 COVID-19 LAB TEST NON-CDC: HCPCS | Performed by: EMERGENCY MEDICINE

## 2022-11-19 PROCEDURE — 99900035 HC TECH TIME PER 15 MIN (STAT)

## 2022-11-19 PROCEDURE — 99284 PR EMERGENCY DEPT VISIT,LEVEL IV: ICD-10-PCS | Mod: CS,,, | Performed by: EMERGENCY MEDICINE

## 2022-11-19 RX ORDER — ALBUTEROL SULFATE 90 UG/1
2 AEROSOL, METERED RESPIRATORY (INHALATION) EVERY 6 HOURS PRN
Status: DISCONTINUED | OUTPATIENT
Start: 2022-11-19 | End: 2022-11-19 | Stop reason: HOSPADM

## 2022-11-19 RX ORDER — ALBUTEROL SULFATE 90 UG/1
1-2 AEROSOL, METERED RESPIRATORY (INHALATION) EVERY 6 HOURS PRN
Qty: 1 G | Refills: 0 | Status: SHIPPED | OUTPATIENT
Start: 2022-11-19 | End: 2023-11-19

## 2022-11-19 RX ADMIN — ALBUTEROL SULFATE 2 PUFF: 108 INHALANT RESPIRATORY (INHALATION) at 05:11

## 2022-11-19 NOTE — TELEPHONE ENCOUNTER
Pt reports was seen in ED today and was swabbed for Covid and the Flu, but system went down where her results could not be given to her while she was there, doctor told her to check her [employer] halie in a few hours to see the results, but the halie is not working, pt wanting to know the results to know if she will need to be quarantined or not. Pt advised we can not discuss lab results thru our service, but will connect her to the ED to see if they can assist her, pt was warm transferred to the ED at this time.    Reason for Disposition   [1] Follow-up call from patient regarding patient's clinical status AND [2] information urgent    Additional Information   Lab result questions    Protocols used: Information Only Call - No Triage-A-AH, PCP Call - No Triage-A-AH

## 2022-11-19 NOTE — ED TRIAGE NOTES
Hailey Snyder Laurie, an 57 y.o. female presents to the ED via home. Pt c/o nasal congestion, productive yellow/green cough, and SOB x 4 days.    Chief Complaint   Patient presents with    Shortness of Breath     C/o SOB since earlier tonight. + nasal congestion, vomiting. Took covid test and it was negative. PMH mitral valve prolapse and MS.      Review of patient's allergies indicates:   Allergen Reactions    Amoxicillin Nausea Only and Hives    Insect venom Anaphylaxis     Ant    Bee sting [allergen ext-venom-honey bee]     Codeine     Latex, natural rubber Swelling    Zithromax [azithromycin] Hives    Prednisone      psychosis    Zolpidem      HA     Past Medical History:   Diagnosis Date    Asthma, currently dormant 5/13/2013    Constipation, chronic 5/16/2014    IBS - D    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 Jensen

## 2022-11-19 NOTE — DISCHARGE INSTRUCTIONS
Use albuterol inhaler if needed for shortness of breath and/or wheezing  Follow up with your doctor  Return to ED for fevers, worsening cough or shortness of breath, chest pain or any other concerns

## 2022-11-19 NOTE — ED PROVIDER NOTES
Encounter Date: 11/19/2022       History     Chief Complaint   Patient presents with    Shortness of Breath     C/o SOB since earlier tonight. + nasal congestion, vomiting. Took covid test and it was negative. PMH mitral valve prolapse and MS.      HPI  56 y/o F with medical history of hypothyroidism and MS presents with complaint of sob since tonight. Notes that she has had nasal congestion and cough for the past day or two. Today more sob- felt like she could not lie back. [place] worse and the nhad an episode of emesis. Has had a cough but minimal production of sputum. Has felt feverish but no fever. No chest pain.  Took covid test at home and negative.  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 Jensen     Past Surgical History:   Procedure Laterality Date    APPENDECTOMY      AUGMENTATION OF BREAST      BREAST SURGERY      CHOLECYSTECTOMY       Family History   Problem Relation Age of Onset    Diabetes Mother     Cancer Mother 35        colon    Deep vein thrombosis Mother         during pregnancy    Multiple myeloma Mother     Stroke Father     Breast cancer Maternal Aunt 30    Breast cancer Maternal Aunt 50     Social History      Tobacco Use    Smoking status: Never    Smokeless tobacco: Never   Substance Use Topics    Alcohol use: Yes     Alcohol/week: 7.0 standard drinks     Types: 7 Glasses of wine per week     Comment: champagne only/ occasional    Drug use: No     Review of Systems   Constitutional:  Negative for fatigue and fever.   HENT:  Positive for congestion and postnasal drip. Negative for sore throat.    Eyes:  Negative for redness.   Respiratory:  Positive for cough and shortness of breath.    Cardiovascular:  Negative for chest pain and leg swelling.   Gastrointestinal:  Positive for nausea and vomiting. Negative for abdominal pain.   Genitourinary:  Negative for dysuria.   Musculoskeletal:  Negative for back pain and neck stiffness.   Skin:  Negative for rash.   Neurological:  Negative for weakness, light-headedness and headaches.     Physical Exam     Initial Vitals [11/19/22 0355]   BP Pulse Resp Temp SpO2   (!) 139/95 82 18 98 °F (36.7 °C) 99 %      MAP       --         Physical Exam    Nursing note and vitals reviewed.  Constitutional: She appears well-developed and well-nourished. She is not diaphoretic. No distress.   HENT:   Head: Normocephalic.   Mouth/Throat: Oropharynx is clear and moist. No oropharyngeal exudate.   Eyes: Conjunctivae are normal.   Neck: Neck supple.   Cardiovascular:  Normal rate, regular rhythm and normal heart sounds.           No murmur heard.  Pulmonary/Chest: No respiratory distress.   Diffuse wheezing   Abdominal: Abdomen is soft. She exhibits no distension. There is no abdominal tenderness.   Musculoskeletal:         General: No tenderness or edema.      Cervical back: Neck supple.     Lymphadenopathy:     She has no cervical adenopathy.   Neurological: She is alert and oriented to person, place, and time. GCS score is 15. GCS eye subscore is 4. GCS verbal subscore is 5. GCS motor subscore is 6.   Skin: Skin is warm and dry.       ED Course   Procedures  Labs Reviewed   INFLUENZA A & B  BY MOLECULAR   SARS-COV-2 RNA AMPLIFICATION, QUAL    Narrative:     Is the patient symptomatic?->Yes  Is testing needed for patient travel?->No  Is this needed for pre-procedure or pre-op testing?->No          Imaging Results    None          Medications - No data to display    Medical Decision Making:   History:   Old Medical Records: I decided to obtain old medical records.  Initial Assessment:   56 y/o F with URI like symptom and worsened SOB, clearly bronchospastic on exam with known history of asthma but no problems for years  Ddx: covid, viral pneumonia, bronchitis with reactive airway  Have considered but do not suspect bacterial pneumonia, PE  Treat albuterol neb and reassess  Clinical Tests:   Lab Tests: Ordered and Reviewed  ED Management:  Pt does not want to wait for alb monika. Given alb inh. Pt feels fine to go home. Given albuterol in but left prior to reassessment. Eloped prior to receiving discharge instructions.                        Clinical Impression:   Final diagnoses:  [J06.9] Viral URI with cough (Primary)      ED Disposition Condition    Eloped                 Leila Elizondo MD  11/19/22 1116

## 2022-11-21 ENCOUNTER — OFFICE VISIT (OUTPATIENT)
Dept: INTERNAL MEDICINE | Facility: CLINIC | Age: 57
End: 2022-11-21
Attending: INTERNAL MEDICINE
Payer: COMMERCIAL

## 2022-11-21 VITALS
WEIGHT: 128 LBS | HEIGHT: 67 IN | SYSTOLIC BLOOD PRESSURE: 114 MMHG | HEART RATE: 80 BPM | TEMPERATURE: 99 F | DIASTOLIC BLOOD PRESSURE: 69 MMHG | OXYGEN SATURATION: 96 % | BODY MASS INDEX: 20.09 KG/M2

## 2022-11-21 DIAGNOSIS — R06.2 WHEEZES: ICD-10-CM

## 2022-11-21 DIAGNOSIS — R50.9 FEVER, UNSPECIFIED FEVER CAUSE: ICD-10-CM

## 2022-11-21 DIAGNOSIS — J32.9 SINUSITIS, UNSPECIFIED CHRONICITY, UNSPECIFIED LOCATION: Primary | ICD-10-CM

## 2022-11-21 DIAGNOSIS — R05.9 COUGH, UNSPECIFIED TYPE: ICD-10-CM

## 2022-11-21 PROCEDURE — 3074F SYST BP LT 130 MM HG: CPT | Mod: CPTII,S$GLB,, | Performed by: INTERNAL MEDICINE

## 2022-11-21 PROCEDURE — 3078F DIAST BP <80 MM HG: CPT | Mod: CPTII,S$GLB,, | Performed by: INTERNAL MEDICINE

## 2022-11-21 PROCEDURE — 99214 PR OFFICE/OUTPT VISIT, EST, LEVL IV, 30-39 MIN: ICD-10-PCS | Mod: 25,S$GLB,, | Performed by: INTERNAL MEDICINE

## 2022-11-21 PROCEDURE — 3008F BODY MASS INDEX DOCD: CPT | Mod: CPTII,S$GLB,, | Performed by: INTERNAL MEDICINE

## 2022-11-21 PROCEDURE — 3008F PR BODY MASS INDEX (BMI) DOCUMENTED: ICD-10-PCS | Mod: CPTII,S$GLB,, | Performed by: INTERNAL MEDICINE

## 2022-11-21 PROCEDURE — 1160F PR REVIEW ALL MEDS BY PRESCRIBER/CLIN PHARMACIST DOCUMENTED: ICD-10-PCS | Mod: CPTII,S$GLB,, | Performed by: INTERNAL MEDICINE

## 2022-11-21 PROCEDURE — 99214 OFFICE O/P EST MOD 30 MIN: CPT | Mod: 25,S$GLB,, | Performed by: INTERNAL MEDICINE

## 2022-11-21 PROCEDURE — 1160F RVW MEDS BY RX/DR IN RCRD: CPT | Mod: CPTII,S$GLB,, | Performed by: INTERNAL MEDICINE

## 2022-11-21 PROCEDURE — 3074F PR MOST RECENT SYSTOLIC BLOOD PRESSURE < 130 MM HG: ICD-10-PCS | Mod: CPTII,S$GLB,, | Performed by: INTERNAL MEDICINE

## 2022-11-21 PROCEDURE — 1159F PR MEDICATION LIST DOCUMENTED IN MEDICAL RECORD: ICD-10-PCS | Mod: CPTII,S$GLB,, | Performed by: INTERNAL MEDICINE

## 2022-11-21 PROCEDURE — 1159F MED LIST DOCD IN RCRD: CPT | Mod: CPTII,S$GLB,, | Performed by: INTERNAL MEDICINE

## 2022-11-21 PROCEDURE — 96372 PR INJECTION,THERAP/PROPH/DIAG2ST, IM OR SUBCUT: ICD-10-PCS | Mod: S$GLB,,, | Performed by: INTERNAL MEDICINE

## 2022-11-21 PROCEDURE — 3078F PR MOST RECENT DIASTOLIC BLOOD PRESSURE < 80 MM HG: ICD-10-PCS | Mod: CPTII,S$GLB,, | Performed by: INTERNAL MEDICINE

## 2022-11-21 PROCEDURE — 96372 THER/PROPH/DIAG INJ SC/IM: CPT | Mod: S$GLB,,, | Performed by: INTERNAL MEDICINE

## 2022-11-21 RX ORDER — CEFTRIAXONE 1 G/1
1 INJECTION, POWDER, FOR SOLUTION INTRAMUSCULAR; INTRAVENOUS
Status: COMPLETED | OUTPATIENT
Start: 2022-11-21 | End: 2022-11-21

## 2022-11-21 RX ORDER — METHYLPREDNISOLONE ACETATE 80 MG/ML
80 INJECTION, SUSPENSION INTRA-ARTICULAR; INTRALESIONAL; INTRAMUSCULAR; SOFT TISSUE ONCE
Status: COMPLETED | OUTPATIENT
Start: 2022-11-21 | End: 2022-11-21

## 2022-11-21 RX ORDER — TRIAMCINOLONE ACETONIDE 40 MG/ML
40 INJECTION, SUSPENSION INTRA-ARTICULAR; INTRAMUSCULAR ONCE
Status: COMPLETED | OUTPATIENT
Start: 2022-11-21 | End: 2022-11-21

## 2022-11-21 RX ADMIN — METHYLPREDNISOLONE ACETATE 80 MG: 80 INJECTION, SUSPENSION INTRA-ARTICULAR; INTRALESIONAL; INTRAMUSCULAR; SOFT TISSUE at 03:11

## 2022-11-21 RX ADMIN — CEFTRIAXONE 1 G: 1 INJECTION, POWDER, FOR SOLUTION INTRAMUSCULAR; INTRAVENOUS at 03:11

## 2022-11-21 RX ADMIN — TRIAMCINOLONE ACETONIDE 40 MG: 40 INJECTION, SUSPENSION INTRA-ARTICULAR; INTRAMUSCULAR at 03:11

## 2022-11-21 NOTE — PATIENT INSTRUCTIONS
Take Benadryl 25-50 mg as needed for sleep and rash.  Notify my office if you do not feel better by Wednesday.

## 2022-11-21 NOTE — PROGRESS NOTES
Subjective:       Patient ID: Hailey Sullivan is a 57 y.o. female.    Chief Complaint: Shortness of Breath (Covid/Neg ), Cough, Fever, and Sinus Problem    She went to the ED on November 19th for cough and shortness of breath.  She tested negative for COVID and influenza.  She was given albuterol HFA with improvement.  She now has low-grade fever and cough productive of thick green sputum.    Shortness of Breath  This is a recurrent problem. The current episode started in the past 7 days. The problem has been gradually worsening. Associated symptoms include a fever. Pertinent negatives include no chest pain.   Cough  Associated symptoms include a fever and shortness of breath. Pertinent negatives include no chest pain.   Fever   Associated symptoms include coughing. Pertinent negatives include no chest pain.   Sinus Problem  Associated symptoms include coughing and shortness of breath.   Review of Systems   Constitutional:  Positive for fever.   Respiratory:  Positive for cough and shortness of breath.    Cardiovascular:  Negative for chest pain.       Objective:      Physical Exam  Vitals and nursing note reviewed.   Constitutional:       Appearance: She is well-developed.   HENT:      Head: Normocephalic.   Eyes:      Pupils: Pupils are equal, round, and reactive to light.   Cardiovascular:      Rate and Rhythm: Normal rate and regular rhythm.      Heart sounds: Murmur heard.   Crescendo decrescendo systolic murmur is present with a grade of 2/6.      Comments: @RUSB  Pulmonary:      Effort: Pulmonary effort is normal.      Breath sounds: Wheezing and rhonchi present.   Neurological:      Mental Status: She is alert.       Assessment:       Problem List Items Addressed This Visit    None  Visit Diagnoses       Sinusitis, unspecified chronicity, unspecified location    -  Primary    Cough, unspecified type        Wheezes        Fever, unspecified fever cause                  Plan:       Per orders and  D/C instructions.   She does not react well to antibiotic or steroid pills.  Rocephin 1 g IM and steroid shot today for cough with wheezing and fever.  Take Benadryl as needed.   She defers labs until next visit.    Between 30 and 39 min of total time for evaluation and management services were spent on the patient today.  The medical problems and treatment options were discussed, and all questions were answered.

## 2022-12-01 ENCOUNTER — PATIENT MESSAGE (OUTPATIENT)
Dept: PSYCHIATRY | Facility: CLINIC | Age: 57
End: 2022-12-01
Payer: COMMERCIAL

## 2023-01-18 ENCOUNTER — PATIENT MESSAGE (OUTPATIENT)
Dept: ADMINISTRATIVE | Facility: HOSPITAL | Age: 58
End: 2023-01-18
Payer: COMMERCIAL

## 2023-01-19 ENCOUNTER — TELEPHONE (OUTPATIENT)
Dept: UROGYNECOLOGY | Facility: CLINIC | Age: 58
End: 2023-01-19
Payer: COMMERCIAL

## 2023-01-19 NOTE — TELEPHONE ENCOUNTER
----- Message from Wilmar Christie sent at 1/19/2023  4:52 PM CST -----  Name of Who is Calling: SHUKRI CASTILLO [[id number]]           What is the request in detail: PT stated that she is having to leave her vacation earlier due to the fact that she is having severe vaginal pain. She stated that April is too far and she is needing a=immediate assistance.Please contact to further discuss and advise.            Can the clinic reply by [facility]: NO           What Number to Call Back if not in [facility]: [phone]

## 2023-01-19 NOTE — TELEPHONE ENCOUNTER
"Returned patient call. Patient states she is currently on vacation in [place]. States she has MS and often has difficulty using the restroom and today she suddenly felt a lot of pressure and pain in the vaginal area while using the restroom. She now feels something protruding out of her vagina. Patient is coming back to the [place] tomorrow and is requesting an appointment with Dr. Causey. Informed patient that Dr. Causey's first availability isn't until April, however we have many other capable providers able to see her earlier. Patient refused to see another provider, so I informed her I could schedule her for April and put her on the wait list for an earlier appointment. Patient stated "so the doctor thinks its a good idea for my uterus or my bladder to be hanging out of my vagina until April?", after which I explained to patient that there are many other patients who have the same condition who have also been waiting for a long time to see Dr. Causey. Patient ended call.  "

## 2023-02-13 ENCOUNTER — PATIENT MESSAGE (OUTPATIENT)
Dept: ADMINISTRATIVE | Facility: HOSPITAL | Age: 58
End: 2023-02-13

## 2023-02-13 ENCOUNTER — PATIENT OUTREACH (OUTPATIENT)
Dept: ADMINISTRATIVE | Facility: HOSPITAL | Age: 58
End: 2023-02-13

## 2023-02-20 ENCOUNTER — PATIENT MESSAGE (OUTPATIENT)
Dept: PSYCHIATRY | Facility: CLINIC | Age: 58
End: 2023-02-20

## 2023-02-24 ENCOUNTER — OFFICE VISIT (OUTPATIENT)
Dept: INTERNAL MEDICINE | Facility: CLINIC | Age: 58
End: 2023-02-24
Attending: INTERNAL MEDICINE
Payer: COMMERCIAL

## 2023-02-24 VITALS
SYSTOLIC BLOOD PRESSURE: 143 MMHG | OXYGEN SATURATION: 97 % | DIASTOLIC BLOOD PRESSURE: 92 MMHG | HEART RATE: 103 BPM | HEIGHT: 67 IN | WEIGHT: 129 LBS | BODY MASS INDEX: 20.25 KG/M2

## 2023-02-24 DIAGNOSIS — R07.9 CHEST PAIN, UNSPECIFIED TYPE: Primary | ICD-10-CM

## 2023-02-24 DIAGNOSIS — Q23.1 BICUSPID AORTIC VALVE: ICD-10-CM

## 2023-02-24 DIAGNOSIS — R11.2 NAUSEA AND VOMITING, UNSPECIFIED VOMITING TYPE: ICD-10-CM

## 2023-02-24 DIAGNOSIS — R42 DIZZY SPELLS: ICD-10-CM

## 2023-02-24 DIAGNOSIS — I08.0 MILD MITRAL AND AORTIC REGURGITATION: ICD-10-CM

## 2023-02-24 LAB
EKG 12-LEAD: ABNORMAL
PR INTERVAL: ABNORMAL
PRT AXES: ABNORMAL
QRS DURATION: ABNORMAL
QT/QTC: ABNORMAL
VENTRICULAR RATE: ABNORMAL

## 2023-02-24 PROCEDURE — 3077F PR MOST RECENT SYSTOLIC BLOOD PRESSURE >= 140 MM HG: ICD-10-PCS | Mod: CPTII,S$GLB,, | Performed by: INTERNAL MEDICINE

## 2023-02-24 PROCEDURE — 1159F MED LIST DOCD IN RCRD: CPT | Mod: CPTII,S$GLB,, | Performed by: INTERNAL MEDICINE

## 2023-02-24 PROCEDURE — 3008F BODY MASS INDEX DOCD: CPT | Mod: CPTII,S$GLB,, | Performed by: INTERNAL MEDICINE

## 2023-02-24 PROCEDURE — 3080F DIAST BP >= 90 MM HG: CPT | Mod: CPTII,S$GLB,, | Performed by: INTERNAL MEDICINE

## 2023-02-24 PROCEDURE — 1160F PR REVIEW ALL MEDS BY PRESCRIBER/CLIN PHARMACIST DOCUMENTED: ICD-10-PCS | Mod: CPTII,S$GLB,, | Performed by: INTERNAL MEDICINE

## 2023-02-24 PROCEDURE — 93000 PR ELECTROCARDIOGRAM, COMPLETE: ICD-10-PCS | Mod: S$GLB,,, | Performed by: INTERNAL MEDICINE

## 2023-02-24 PROCEDURE — 3008F PR BODY MASS INDEX (BMI) DOCUMENTED: ICD-10-PCS | Mod: CPTII,S$GLB,, | Performed by: INTERNAL MEDICINE

## 2023-02-24 PROCEDURE — 3080F PR MOST RECENT DIASTOLIC BLOOD PRESSURE >= 90 MM HG: ICD-10-PCS | Mod: CPTII,S$GLB,, | Performed by: INTERNAL MEDICINE

## 2023-02-24 PROCEDURE — 1159F PR MEDICATION LIST DOCUMENTED IN MEDICAL RECORD: ICD-10-PCS | Mod: CPTII,S$GLB,, | Performed by: INTERNAL MEDICINE

## 2023-02-24 PROCEDURE — 93000 ELECTROCARDIOGRAM COMPLETE: CPT | Mod: S$GLB,,, | Performed by: INTERNAL MEDICINE

## 2023-02-24 PROCEDURE — 99214 PR OFFICE/OUTPT VISIT, EST, LEVL IV, 30-39 MIN: ICD-10-PCS | Mod: S$GLB,,, | Performed by: INTERNAL MEDICINE

## 2023-02-24 PROCEDURE — 1160F RVW MEDS BY RX/DR IN RCRD: CPT | Mod: CPTII,S$GLB,, | Performed by: INTERNAL MEDICINE

## 2023-02-24 PROCEDURE — 3077F SYST BP >= 140 MM HG: CPT | Mod: CPTII,S$GLB,, | Performed by: INTERNAL MEDICINE

## 2023-02-24 PROCEDURE — 99214 OFFICE O/P EST MOD 30 MIN: CPT | Mod: S$GLB,,, | Performed by: INTERNAL MEDICINE

## 2023-02-24 RX ORDER — ONDANSETRON 8 MG/1
TABLET, ORALLY DISINTEGRATING ORAL
Qty: 15 TABLET | Refills: 0 | Status: SHIPPED | OUTPATIENT
Start: 2023-02-24 | End: 2024-03-11

## 2023-02-24 RX ORDER — DIAZEPAM 5 MG/1
5 TABLET ORAL EVERY 6 HOURS PRN
Qty: 10 TABLET | Refills: 0 | Status: SHIPPED | OUTPATIENT
Start: 2023-02-24 | End: 2023-04-27

## 2023-02-24 NOTE — PROGRESS NOTES
Subjective:       Patient ID: Hailey Sullivan is a 57 y.o. female.    Chief Complaint: Nausea, Emesis, Chest Pain, and Dizziness    She stayed up with friends all night.  She began feeling bad a few hours ago.  She developed chest pain, dizziness, and nausea and vomiting.    Nausea  This is a new problem. The current episode started today. Associated symptoms include chest pain, nausea and vomiting.   Emesis   This is a new problem. The current episode started today. Associated symptoms include chest pain and dizziness.   Chest Pain   This is a new problem. The current episode started today. The onset quality is gradual. The problem has been unchanged. Associated symptoms include dizziness, nausea and vomiting. Pertinent negatives include no shortness of breath.   Dizziness:    Associated symptoms: nausea, vomiting and chest pain.  Review of Systems   Constitutional: Negative.    Respiratory:  Negative for shortness of breath.    Cardiovascular:  Positive for chest pain.   Gastrointestinal:  Positive for nausea and vomiting.   Neurological:  Positive for dizziness.       Objective:      Physical Exam  Vitals and nursing note reviewed.   Constitutional:       Appearance: She is well-developed.   HENT:      Head: Normocephalic.   Eyes:      Pupils: Pupils are equal, round, and reactive to light.   Cardiovascular:      Rate and Rhythm: Normal rate and regular rhythm.      Heart sounds: Murmur heard.   Crescendo decrescendo systolic murmur is present with a grade of 2/6.      Comments: @RUSB  Pulmonary:      Effort: Pulmonary effort is normal.   Neurological:      Mental Status: She is alert.       Assessment:       Problem List Items Addressed This Visit          Unprioritized    Mild mitral and aortic regurgitation    Bicuspid aortic valve     Other Visit Diagnoses       Chest pain, unspecified type    -  Primary    Relevant Orders    POCT EKG 12-LEAD (Manually Resulted by Ordering Provider) (Completed)     Dizzy spells        Nausea and vomiting, unspecified vomiting type                  Plan:       Per orders and D/C instructions.    Her bicuspid aortic valve and mitral and aortic valve regurgitation are stable.  Her EKG is unchanged from December 8, 2020.  Zofran as needed for nausea and vomiting.  Drink lots of liquids, especially Gatorade for dehydration.  Valium as needed for chest pain and dizziness.    Between 30 and 39 min of total time for evaluation and management services were spent on the patient today.  The medical problems and treatment options were discussed, and all questions were answered.

## 2023-04-18 ENCOUNTER — PATIENT MESSAGE (OUTPATIENT)
Dept: ADMINISTRATIVE | Facility: HOSPITAL | Age: 58
End: 2023-04-18
Payer: COMMERCIAL

## 2023-04-27 DIAGNOSIS — F41.9 ANXIETY: ICD-10-CM

## 2023-04-27 RX ORDER — ALPRAZOLAM 1 MG/1
TABLET ORAL
Qty: 30 TABLET | Refills: 2 | Status: SHIPPED | OUTPATIENT
Start: 2023-04-27 | End: 2023-10-27

## 2023-05-11 ENCOUNTER — OFFICE VISIT (OUTPATIENT)
Dept: INTERNAL MEDICINE | Facility: CLINIC | Age: 58
End: 2023-05-11
Attending: INTERNAL MEDICINE
Payer: COMMERCIAL

## 2023-05-11 VITALS
DIASTOLIC BLOOD PRESSURE: 74 MMHG | SYSTOLIC BLOOD PRESSURE: 102 MMHG | HEART RATE: 87 BPM | OXYGEN SATURATION: 98 % | BODY MASS INDEX: 20.09 KG/M2 | HEIGHT: 67 IN | WEIGHT: 128 LBS

## 2023-05-11 DIAGNOSIS — E55.9 VITAMIN D DEFICIENCY: ICD-10-CM

## 2023-05-11 DIAGNOSIS — Q23.1 BICUSPID AORTIC VALVE: ICD-10-CM

## 2023-05-11 DIAGNOSIS — E03.9 HYPOTHYROIDISM, UNSPECIFIED TYPE: ICD-10-CM

## 2023-05-11 DIAGNOSIS — D51.0 PERNICIOUS ANEMIA: ICD-10-CM

## 2023-05-11 DIAGNOSIS — R79.89 OTHER SPECIFIED ABNORMAL FINDINGS OF BLOOD CHEMISTRY: ICD-10-CM

## 2023-05-11 DIAGNOSIS — Z12.31 BREAST CANCER SCREENING BY MAMMOGRAM: ICD-10-CM

## 2023-05-11 DIAGNOSIS — Z00.00 ROUTINE ADULT HEALTH MAINTENANCE: Primary | ICD-10-CM

## 2023-05-11 DIAGNOSIS — R42 DIZZY SPELLS: ICD-10-CM

## 2023-05-11 DIAGNOSIS — I08.0 MILD MITRAL AND AORTIC REGURGITATION: Primary | ICD-10-CM

## 2023-05-11 DIAGNOSIS — D50.8 OTHER IRON DEFICIENCY ANEMIA: ICD-10-CM

## 2023-05-11 DIAGNOSIS — Z12.11 COLON CANCER SCREENING: ICD-10-CM

## 2023-05-11 DIAGNOSIS — Z00.00 ROUTINE ADULT HEALTH MAINTENANCE: ICD-10-CM

## 2023-05-11 DIAGNOSIS — E78.9 DISORDER OF LIPID METABOLISM: ICD-10-CM

## 2023-05-11 PROCEDURE — 3074F PR MOST RECENT SYSTOLIC BLOOD PRESSURE < 130 MM HG: ICD-10-PCS | Mod: CPTII,S$GLB,, | Performed by: INTERNAL MEDICINE

## 2023-05-11 PROCEDURE — 99396 PR PREVENTIVE VISIT,EST,40-64: ICD-10-PCS | Mod: S$GLB,,, | Performed by: INTERNAL MEDICINE

## 2023-05-11 PROCEDURE — 3008F BODY MASS INDEX DOCD: CPT | Mod: CPTII,S$GLB,, | Performed by: INTERNAL MEDICINE

## 2023-05-11 PROCEDURE — 3078F PR MOST RECENT DIASTOLIC BLOOD PRESSURE < 80 MM HG: ICD-10-PCS | Mod: CPTII,S$GLB,, | Performed by: INTERNAL MEDICINE

## 2023-05-11 PROCEDURE — 3008F PR BODY MASS INDEX (BMI) DOCUMENTED: ICD-10-PCS | Mod: CPTII,S$GLB,, | Performed by: INTERNAL MEDICINE

## 2023-05-11 PROCEDURE — 1160F PR REVIEW ALL MEDS BY PRESCRIBER/CLIN PHARMACIST DOCUMENTED: ICD-10-PCS | Mod: CPTII,S$GLB,, | Performed by: INTERNAL MEDICINE

## 2023-05-11 PROCEDURE — 1160F RVW MEDS BY RX/DR IN RCRD: CPT | Mod: CPTII,S$GLB,, | Performed by: INTERNAL MEDICINE

## 2023-05-11 PROCEDURE — 99396 PREV VISIT EST AGE 40-64: CPT | Mod: S$GLB,,, | Performed by: INTERNAL MEDICINE

## 2023-05-11 PROCEDURE — 3078F DIAST BP <80 MM HG: CPT | Mod: CPTII,S$GLB,, | Performed by: INTERNAL MEDICINE

## 2023-05-11 PROCEDURE — 1159F PR MEDICATION LIST DOCUMENTED IN MEDICAL RECORD: ICD-10-PCS | Mod: CPTII,S$GLB,, | Performed by: INTERNAL MEDICINE

## 2023-05-11 PROCEDURE — 3074F SYST BP LT 130 MM HG: CPT | Mod: CPTII,S$GLB,, | Performed by: INTERNAL MEDICINE

## 2023-05-11 PROCEDURE — 1159F MED LIST DOCD IN RCRD: CPT | Mod: CPTII,S$GLB,, | Performed by: INTERNAL MEDICINE

## 2023-05-11 NOTE — PATIENT INSTRUCTIONS
Go to an [facility] lab for blood work.        Tips for Healthy Living and Routine Preventative Care - 2023                                                            (These guidelines are intended for healthy adults)      1. Exercise  Exercise aerobically with a target heart rate of (220-age) x 0.8  Exercise 30-45 minutes on most days of the week    2. Diet and Supplements- All supplements can be obtained through a varied, healthy diet   Calcium: 1,000 - 1,200 mg each day   8 oz milk or Calcium fortified O.J. = 300, 8 oz Yogurt = 400 mg, 1 oz of cheese =100-200 mg              8 oz Oatmeal = 215 mg, 3 oz [place] = 240 mg  Vitamin D: 800 iu each day- Can probably be obtained by 30 min. of direct sunlight    each day             3 oz. [place] = 800 iu,  3 oz. Tuna =150 iu, Milk or fortified O.J. = 120 iu  Fish oil: 1-2 grams each day or about 840 mg of EPA and DHA (Omega-3 fatty acids) each day             3 oz. [place]=2 grams,  3 oz. Tuna=1.3 grams,  3 oz. drained light Tuna= 0.25 grams  Folic acid 800 mcg each day for all women planning or capable of pregnancy    3. Lifestyle  Alcohol: 1 drink = 12 oz. domestic beer, 4 oz. wine, or 1 oz. hard (80 proof) liquor             Males: </= 14 drinks per week with no more than 4 in any one day             Females: </= 7 drinks per week with no more than 3 in any one day  Salt: 1.2 - 3 grams of Sodium each day.  Tobacco: Dont smoke, or quit smoking (discuss with your doctor)  Depression: If you feel depressed discuss with your doctor  Weight: Maintain a healthy body weight. Stay within 10% of:             Males: 106 lbs. + 6 lbs per inch height above 5 feet             Females: 100 lbs + 5 lbs per inch height above 5 feet    4. Routine tests  Blood pressure check at each visit, or at least once each year  HIV screening (one time) if less than 65 years old  Hepatitis C screen (one time) if less than 80 years old  Cholesterol screening every 3 years starting at age  21  Glucose/Hemaglobin A1C check every 3 years starting at age 35.  TSH (thyroid screen) every 2 years starting at age 50  Colonoscopy at age 45, and repeat every 10 years until age 75. Consider screening until age 85. DNA stool test (Cologuard) every 3 years is an acceptable alternative.  Vision screen at age 65    Females:  Gyn exam with cervical HPV test every 3 years or Pap smear every three years starting at age 25                  Stop screening at age 65 if past 3 exams were normal                  No screening for women who have had a hysterectomy with removal of cervix  Mammogram every 1-2 years starting at age 40 until age 75  Consider continuing Mammograms every other year for those older than 75 with a life expectancy of more than 10 years  Bone density scan at about age 65    Males:  PSA screening annually at age 50, age 45 for  Americans, until age 75. Consider annual screening after age 75                   5. Immunizations  Influenza vaccine every year in the fall, especially if >50 or with a chronic disease  Tetanus/Diphtheria/Pertussis (Tdap) vaccine once (after the age of 18), then Tetanus/Diphtheria (Td) or Tdap vaccine every 10 years  Shingles (Shingrix) vaccine after age 50 and get a 2nd dose after 2-6 months  Pneumonia vaccine (Prevnar-20) at age 65       6. Advanced Directive/End of life care planning  You should consider having a signed document which informs physicians and family of your end of life care wishes.  You can go to [employer]/DNR/[place]. Under Step 1 click download AdobePDF and print.  This is the [place] physician order for scope of Treatment ([place]) form.  You can also request a blank copy of the [place] form from my office.  Bring a copy of the signed document to my office so we can scan it in your medical chart.

## 2023-05-11 NOTE — PROGRESS NOTES
Subjective     Patient ID: Hailey Sullivan is a 57 y.o. female.    Chief Complaint: Annual Exam (Feels like she is having sugar issues and would like A1C checked )    Recently she has episodes of feeling flushed.  She feels dizzy today.  She does have irregular menstrual periods.      Adult Wellness Exam:    Mental Conditions: None  Depression Risk Factors: None  BMI: See Vital signs   Colon screen:    See Health Maintenance Report      Females: Mammogram and PAP: per Gyn                                 Vaccines (Flu, Adacel, Shingrix): See Health Maintenance Report  Routine labs (Cholesterol, Glucose/Hgb A1C, and TSH): ordered.     The patient's current health status is: Good   Patient was educated on routine health maintenance. See Patient Instructions.                               Review of Systems   Constitutional: Negative.    Respiratory:  Negative for shortness of breath.    Cardiovascular:  Negative for chest pain.   Neurological:  Positive for dizziness.        Objective     Physical Exam  Vitals and nursing note reviewed.   Constitutional:       Appearance: She is well-developed.   HENT:      Head: Normocephalic.   Eyes:      Pupils: Pupils are equal, round, and reactive to light.   Cardiovascular:      Rate and Rhythm: Normal rate and regular rhythm.      Heart sounds: Murmur heard.   Crescendo decrescendo systolic murmur is present with a grade of 2/6.      Comments: @RUSB  Pulmonary:      Effort: Pulmonary effort is normal.   Neurological:      Mental Status: She is alert.          Assessment and Plan     Problem List Items Addressed This Visit          Unprioritized    Mild mitral and aortic regurgitation - Primary    Relevant Orders    Echo Saline Bubble? No    Bicuspid aortic valve    Relevant Orders    Echo Saline Bubble? No     Other Visit Diagnoses       Colon cancer screening        Relevant Orders    Ambulatory referral/consult to Gastroenterology    Breast cancer screening by  mammogram        Relevant Orders    Mammo Digital Screening Bilat w/ Willy    Routine adult health maintenance        Dizzy spells                Per orders and D/C instructions.  Echocardiogram to evaluate bicuspid aortic valve and mitral and aortic valve regurgitation.  Check routine labs with FSH and estradiol level to evaluate flushing and dizziness.  Refer to GI for colonoscopy.  Mammogram ordered.

## 2023-05-12 ENCOUNTER — LAB VISIT (OUTPATIENT)
Dept: LAB | Facility: OTHER | Age: 58
End: 2023-05-12
Attending: INTERNAL MEDICINE
Payer: COMMERCIAL

## 2023-05-12 DIAGNOSIS — R42 DIZZY SPELLS: ICD-10-CM

## 2023-05-12 DIAGNOSIS — E78.9 DISORDER OF LIPID METABOLISM: ICD-10-CM

## 2023-05-12 DIAGNOSIS — Z00.00 ROUTINE ADULT HEALTH MAINTENANCE: ICD-10-CM

## 2023-05-12 DIAGNOSIS — R79.89 OTHER SPECIFIED ABNORMAL FINDINGS OF BLOOD CHEMISTRY: ICD-10-CM

## 2023-05-12 DIAGNOSIS — E55.9 VITAMIN D DEFICIENCY: ICD-10-CM

## 2023-05-12 DIAGNOSIS — D50.8 OTHER IRON DEFICIENCY ANEMIA: ICD-10-CM

## 2023-05-12 DIAGNOSIS — E03.9 HYPOTHYROIDISM, UNSPECIFIED TYPE: ICD-10-CM

## 2023-05-12 DIAGNOSIS — D51.0 PERNICIOUS ANEMIA: ICD-10-CM

## 2023-05-12 LAB
25(OH)D3+25(OH)D2 SERPL-MCNC: 32 NG/ML (ref 30–96)
ALBUMIN SERPL BCP-MCNC: 4.6 G/DL (ref 3.5–5.2)
ALP SERPL-CCNC: 79 U/L (ref 55–135)
ALT SERPL W/O P-5'-P-CCNC: 26 U/L (ref 10–44)
ANION GAP SERPL CALC-SCNC: 10 MMOL/L (ref 8–16)
AST SERPL-CCNC: 38 U/L (ref 10–40)
BASOPHILS # BLD AUTO: 0.04 K/UL (ref 0–0.2)
BASOPHILS NFR BLD: 0.8 % (ref 0–1.9)
BILIRUB SERPL-MCNC: 0.9 MG/DL (ref 0.1–1)
BUN SERPL-MCNC: 11 MG/DL (ref 6–20)
CALCIUM SERPL-MCNC: 9.6 MG/DL (ref 8.7–10.5)
CHLORIDE SERPL-SCNC: 102 MMOL/L (ref 95–110)
CHOLEST SERPL-MCNC: 185 MG/DL (ref 120–199)
CHOLEST/HDLC SERPL: 3.9 {RATIO} (ref 2–5)
CO2 SERPL-SCNC: 28 MMOL/L (ref 23–29)
CREAT SERPL-MCNC: 0.8 MG/DL (ref 0.5–1.4)
CRP SERPL-MCNC: 0.2 MG/L (ref 0–8.2)
DIFFERENTIAL METHOD: ABNORMAL
EOSINOPHIL # BLD AUTO: 0.2 K/UL (ref 0–0.5)
EOSINOPHIL NFR BLD: 3 % (ref 0–8)
ERYTHROCYTE [DISTWIDTH] IN BLOOD BY AUTOMATED COUNT: 11.6 % (ref 11.5–14.5)
EST. GFR  (NO RACE VARIABLE): >60 ML/MIN/1.73 M^2
ESTIMATED AVG GLUCOSE: 97 MG/DL (ref 68–131)
ESTRADIOL SERPL-MCNC: <10 PG/ML
FSH SERPL-ACNC: 106.63 MIU/ML
GLUCOSE SERPL-MCNC: 103 MG/DL (ref 70–110)
HBA1C MFR BLD: 5 % (ref 4–5.6)
HCT VFR BLD AUTO: 41.1 % (ref 37–48.5)
HCV AB SERPL QL IA: NORMAL
HDLC SERPL-MCNC: 47 MG/DL (ref 40–75)
HDLC SERPL: 25.4 % (ref 20–50)
HGB BLD-MCNC: 13.6 G/DL (ref 12–16)
HIV 1+2 AB+HIV1 P24 AG SERPL QL IA: NORMAL
IMM GRANULOCYTES # BLD AUTO: 0.01 K/UL (ref 0–0.04)
IMM GRANULOCYTES NFR BLD AUTO: 0.2 % (ref 0–0.5)
LDLC SERPL CALC-MCNC: 103.8 MG/DL (ref 63–159)
LYMPHOCYTES # BLD AUTO: 1.7 K/UL (ref 1–4.8)
LYMPHOCYTES NFR BLD: 32.7 % (ref 18–48)
MCH RBC QN AUTO: 31.7 PG (ref 27–31)
MCHC RBC AUTO-ENTMCNC: 33.1 G/DL (ref 32–36)
MCV RBC AUTO: 96 FL (ref 82–98)
MONOCYTES # BLD AUTO: 0.3 K/UL (ref 0.3–1)
MONOCYTES NFR BLD: 6.7 % (ref 4–15)
NEUTROPHILS # BLD AUTO: 2.9 K/UL (ref 1.8–7.7)
NEUTROPHILS NFR BLD: 56.6 % (ref 38–73)
NONHDLC SERPL-MCNC: 138 MG/DL
NRBC BLD-RTO: 0 /100 WBC
PLATELET # BLD AUTO: 176 K/UL (ref 150–450)
PMV BLD AUTO: 8.8 FL (ref 9.2–12.9)
POTASSIUM SERPL-SCNC: 4.1 MMOL/L (ref 3.5–5.1)
PROT SERPL-MCNC: 7.9 G/DL (ref 6–8.4)
RBC # BLD AUTO: 4.29 M/UL (ref 4–5.4)
SODIUM SERPL-SCNC: 140 MMOL/L (ref 136–145)
TRIGL SERPL-MCNC: 171 MG/DL (ref 30–150)
TSH SERPL DL<=0.005 MIU/L-ACNC: 1.79 UIU/ML (ref 0.4–4)
VIT B12 SERPL-MCNC: 441 PG/ML (ref 210–950)
WBC # BLD AUTO: 5.04 K/UL (ref 3.9–12.7)

## 2023-05-12 PROCEDURE — 82607 VITAMIN B-12: CPT | Performed by: INTERNAL MEDICINE

## 2023-05-12 PROCEDURE — 84443 ASSAY THYROID STIM HORMONE: CPT | Performed by: INTERNAL MEDICINE

## 2023-05-12 PROCEDURE — 82670 ASSAY OF TOTAL ESTRADIOL: CPT | Performed by: INTERNAL MEDICINE

## 2023-05-12 PROCEDURE — 82306 VITAMIN D 25 HYDROXY: CPT | Performed by: INTERNAL MEDICINE

## 2023-05-12 PROCEDURE — 87389 HIV-1 AG W/HIV-1&-2 AB AG IA: CPT | Performed by: INTERNAL MEDICINE

## 2023-05-12 PROCEDURE — 85025 COMPLETE CBC W/AUTO DIFF WBC: CPT | Performed by: INTERNAL MEDICINE

## 2023-05-12 PROCEDURE — 86803 HEPATITIS C AB TEST: CPT | Performed by: INTERNAL MEDICINE

## 2023-05-12 PROCEDURE — 80053 COMPREHEN METABOLIC PANEL: CPT | Performed by: INTERNAL MEDICINE

## 2023-05-12 PROCEDURE — 83036 HEMOGLOBIN GLYCOSYLATED A1C: CPT | Performed by: INTERNAL MEDICINE

## 2023-05-12 PROCEDURE — 80061 LIPID PANEL: CPT | Performed by: INTERNAL MEDICINE

## 2023-05-12 PROCEDURE — 86140 C-REACTIVE PROTEIN: CPT | Performed by: INTERNAL MEDICINE

## 2023-05-12 PROCEDURE — 36415 COLL VENOUS BLD VENIPUNCTURE: CPT | Performed by: INTERNAL MEDICINE

## 2023-05-12 PROCEDURE — 83001 ASSAY OF GONADOTROPIN (FSH): CPT | Performed by: INTERNAL MEDICINE

## 2023-06-12 DIAGNOSIS — R73.9 HYPERGLYCEMIA: Primary | ICD-10-CM

## 2023-06-28 RX ORDER — AZITHROMYCIN 250 MG/1
TABLET, FILM COATED ORAL
Qty: 4 TABLET | Refills: 0 | Status: SHIPPED | OUTPATIENT
Start: 2023-06-28 | End: 2023-07-24

## 2023-07-21 ENCOUNTER — PATIENT MESSAGE (OUTPATIENT)
Dept: PSYCHIATRY | Facility: CLINIC | Age: 58
End: 2023-07-21
Payer: COMMERCIAL

## 2023-07-24 ENCOUNTER — OFFICE VISIT (OUTPATIENT)
Dept: INTERNAL MEDICINE | Facility: CLINIC | Age: 58
End: 2023-07-24
Attending: INTERNAL MEDICINE
Payer: COMMERCIAL

## 2023-07-24 VITALS
BODY MASS INDEX: 19.46 KG/M2 | WEIGHT: 124 LBS | DIASTOLIC BLOOD PRESSURE: 72 MMHG | HEART RATE: 92 BPM | HEIGHT: 67 IN | SYSTOLIC BLOOD PRESSURE: 102 MMHG | OXYGEN SATURATION: 98 %

## 2023-07-24 DIAGNOSIS — T63.461A WASP STING, ACCIDENTAL OR UNINTENTIONAL, INITIAL ENCOUNTER: ICD-10-CM

## 2023-07-24 DIAGNOSIS — K21.9 GASTROESOPHAGEAL REFLUX DISEASE, UNSPECIFIED WHETHER ESOPHAGITIS PRESENT: ICD-10-CM

## 2023-07-24 DIAGNOSIS — L03.113 CELLULITIS OF RIGHT UPPER EXTREMITY: ICD-10-CM

## 2023-07-24 DIAGNOSIS — Q23.1 BICUSPID AORTIC VALVE: Primary | ICD-10-CM

## 2023-07-24 DIAGNOSIS — I08.0 MILD MITRAL AND AORTIC REGURGITATION: ICD-10-CM

## 2023-07-24 PROCEDURE — 3074F PR MOST RECENT SYSTOLIC BLOOD PRESSURE < 130 MM HG: ICD-10-PCS | Mod: CPTII,S$GLB,, | Performed by: INTERNAL MEDICINE

## 2023-07-24 PROCEDURE — 1159F MED LIST DOCD IN RCRD: CPT | Mod: CPTII,S$GLB,, | Performed by: INTERNAL MEDICINE

## 2023-07-24 PROCEDURE — 3044F PR MOST RECENT HEMOGLOBIN A1C LEVEL <7.0%: ICD-10-PCS | Mod: CPTII,S$GLB,, | Performed by: INTERNAL MEDICINE

## 2023-07-24 PROCEDURE — 96372 PR INJECTION,THERAP/PROPH/DIAG2ST, IM OR SUBCUT: ICD-10-PCS | Mod: S$GLB,,, | Performed by: INTERNAL MEDICINE

## 2023-07-24 PROCEDURE — 3008F PR BODY MASS INDEX (BMI) DOCUMENTED: ICD-10-PCS | Mod: CPTII,S$GLB,, | Performed by: INTERNAL MEDICINE

## 2023-07-24 PROCEDURE — 99214 PR OFFICE/OUTPT VISIT, EST, LEVL IV, 30-39 MIN: ICD-10-PCS | Mod: 25,S$GLB,, | Performed by: INTERNAL MEDICINE

## 2023-07-24 PROCEDURE — 1160F PR REVIEW ALL MEDS BY PRESCRIBER/CLIN PHARMACIST DOCUMENTED: ICD-10-PCS | Mod: CPTII,S$GLB,, | Performed by: INTERNAL MEDICINE

## 2023-07-24 PROCEDURE — 3008F BODY MASS INDEX DOCD: CPT | Mod: CPTII,S$GLB,, | Performed by: INTERNAL MEDICINE

## 2023-07-24 PROCEDURE — 1160F RVW MEDS BY RX/DR IN RCRD: CPT | Mod: CPTII,S$GLB,, | Performed by: INTERNAL MEDICINE

## 2023-07-24 PROCEDURE — 96372 THER/PROPH/DIAG INJ SC/IM: CPT | Mod: S$GLB,,, | Performed by: INTERNAL MEDICINE

## 2023-07-24 PROCEDURE — 99214 OFFICE O/P EST MOD 30 MIN: CPT | Mod: 25,S$GLB,, | Performed by: INTERNAL MEDICINE

## 2023-07-24 PROCEDURE — 1159F PR MEDICATION LIST DOCUMENTED IN MEDICAL RECORD: ICD-10-PCS | Mod: CPTII,S$GLB,, | Performed by: INTERNAL MEDICINE

## 2023-07-24 PROCEDURE — 3044F HG A1C LEVEL LT 7.0%: CPT | Mod: CPTII,S$GLB,, | Performed by: INTERNAL MEDICINE

## 2023-07-24 PROCEDURE — 3074F SYST BP LT 130 MM HG: CPT | Mod: CPTII,S$GLB,, | Performed by: INTERNAL MEDICINE

## 2023-07-24 PROCEDURE — 3078F DIAST BP <80 MM HG: CPT | Mod: CPTII,S$GLB,, | Performed by: INTERNAL MEDICINE

## 2023-07-24 PROCEDURE — 3078F PR MOST RECENT DIASTOLIC BLOOD PRESSURE < 80 MM HG: ICD-10-PCS | Mod: CPTII,S$GLB,, | Performed by: INTERNAL MEDICINE

## 2023-07-24 RX ORDER — MINOCYCLINE HYDROCHLORIDE 100 MG/1
100 CAPSULE ORAL EVERY 12 HOURS
Qty: 10 CAPSULE | Refills: 0 | Status: SHIPPED | OUTPATIENT
Start: 2023-07-24 | End: 2023-09-11

## 2023-07-24 RX ORDER — METHYLPREDNISOLONE ACETATE 80 MG/ML
80 INJECTION, SUSPENSION INTRA-ARTICULAR; INTRALESIONAL; INTRAMUSCULAR; SOFT TISSUE ONCE
Status: COMPLETED | OUTPATIENT
Start: 2023-07-24 | End: 2023-07-24

## 2023-07-24 RX ORDER — TRIAMCINOLONE ACETONIDE 40 MG/ML
40 INJECTION, SUSPENSION INTRA-ARTICULAR; INTRAMUSCULAR ONCE
Status: COMPLETED | OUTPATIENT
Start: 2023-07-24 | End: 2023-07-24

## 2023-07-24 RX ORDER — CEFTRIAXONE 1 G/1
1 INJECTION, POWDER, FOR SOLUTION INTRAMUSCULAR; INTRAVENOUS
Status: COMPLETED | OUTPATIENT
Start: 2023-07-24 | End: 2023-07-24

## 2023-07-24 RX ADMIN — METHYLPREDNISOLONE ACETATE 80 MG: 80 INJECTION, SUSPENSION INTRA-ARTICULAR; INTRALESIONAL; INTRAMUSCULAR; SOFT TISSUE at 03:07

## 2023-07-24 RX ADMIN — CEFTRIAXONE 1 G: 1 INJECTION, POWDER, FOR SOLUTION INTRAMUSCULAR; INTRAVENOUS at 03:07

## 2023-07-24 RX ADMIN — TRIAMCINOLONE ACETONIDE 40 MG: 40 INJECTION, SUSPENSION INTRA-ARTICULAR; INTRAMUSCULAR at 03:07

## 2023-07-24 NOTE — PATIENT INSTRUCTIONS
Take Zyrtec 10 mg each morning and Benadryl 50 mg each evening for several days.  Take Zantac 150 mg twice each day for several days.

## 2023-07-24 NOTE — PROGRESS NOTES
Subjective     Patient ID: Hailey Sullivan is a 57 y.o. female.    Chief Complaint: Insect Bite (Bee sting swollen R hand )    She was stung by an insect on the dorsal right hand 2 days ago.  Has progressively gotten more swollen, painful, and red.  She has been taking Benadryl with no improvement.    Insect Bite  This is a new problem. The current episode started in the past 7 days. The problem has been gradually worsening. Associated symptoms include nausea, a rash and vomiting. Pertinent negatives include no chest pain.   Review of Systems   Constitutional: Negative.    Respiratory:  Negative for shortness of breath.    Cardiovascular:  Negative for chest pain.   Gastrointestinal:  Positive for nausea and vomiting.   Integumentary:  Positive for rash.        Objective     Physical Exam  Vitals and nursing note reviewed.   Constitutional:       Appearance: She is well-developed.   HENT:      Head: Normocephalic.   Eyes:      Pupils: Pupils are equal, round, and reactive to light.   Cardiovascular:      Rate and Rhythm: Normal rate and regular rhythm.      Heart sounds: Murmur heard.   Crescendo decrescendo systolic murmur is present with a grade of 2/6.      Comments: @RUSB  Pulmonary:      Effort: Pulmonary effort is normal.   Musculoskeletal:      Right hand: Swelling and tenderness present.        Arms:       Comments: She has swelling, tenderness, and erythema of the entire hand up to the mid forearm.   Neurological:      Mental Status: She is alert.          Assessment and Plan     1. Bicuspid aortic valve  Overview:  Mild AoVR - 8/16  Mild/Mod AoVR and mild AS - 8/18, 5/19, 12/20      2. Wasp sting, accidental or unintentional, initial encounter    3. Mild mitral and aortic regurgitation  Overview:  Echo 9/12, 9/14, 8/16  with MVP  Echo 8/18, 5/19, 12/20 - Mild MVR, Mild/Mod AoVR      4. Gastroesophageal reflux disease, unspecified whether esophagitis present    5. Cellulitis of right upper  extremity    Other orders  -     cefTRIAXone injection 1 g  -     methylPREDNISolone acetate injection 80 mg  -     triamcinolone acetonide injection 40 mg  -     minocycline (MINOCIN,DYNACIN) 100 MG capsule; Take 1 capsule (100 mg total) by mouth every 12 (twelve) hours. Take with food.  Dispense: 10 capsule; Refill: 0        Per orders and D/C instructions.  Continue diet and/or meds for GERD, bicuspid aortic valve and mitral valve regurgitation, which are stable.  Rocephin 1 gram IM and steroid shot now for cellulitis of the right hand.  She will take medicine, Zantac, Zyrtec during the day and Benadryl at night.    Between 30 and 39 min of total time for evaluation and management services were spent on the patient today.  The medical problems and treatment options were discussed, and all questions were answered.             Follow up in about 9 months (around 4/24/2024).

## 2023-08-30 ENCOUNTER — HOSPITAL ENCOUNTER (EMERGENCY)
Facility: HOSPITAL | Age: 58
Discharge: HOME | End: 2023-08-30
Attending: STUDENT IN AN ORGANIZED HEALTH CARE EDUCATION/TRAINING PROGRAM
Payer: COMMERCIAL

## 2023-08-30 VITALS
BODY MASS INDEX: 19.29 KG/M2 | SYSTOLIC BLOOD PRESSURE: 115 MMHG | DIASTOLIC BLOOD PRESSURE: 61 MMHG | HEART RATE: 64 BPM | OXYGEN SATURATION: 97 % | WEIGHT: 120 LBS | RESPIRATION RATE: 17 BRPM | HEIGHT: 66 IN | TEMPERATURE: 98 F

## 2023-08-30 DIAGNOSIS — R07.9 CHEST PAIN: ICD-10-CM

## 2023-08-30 DIAGNOSIS — T78.40XA ALLERGIC REACTION, INITIAL ENCOUNTER: Primary | ICD-10-CM

## 2023-08-30 LAB
ALBUMIN SERPL BCP-MCNC: 4.1 G/DL (ref 3.5–5.2)
ALP SERPL-CCNC: 67 U/L (ref 55–135)
ALT SERPL W/O P-5'-P-CCNC: 17 U/L (ref 10–44)
ANION GAP SERPL CALC-SCNC: 10 MMOL/L (ref 8–16)
AST SERPL-CCNC: 29 U/L (ref 10–40)
BASOPHILS # BLD AUTO: 0.03 K/UL (ref 0–0.2)
BASOPHILS NFR BLD: 0.5 % (ref 0–1.9)
BILIRUB SERPL-MCNC: 1 MG/DL (ref 0.1–1)
BILIRUB UR QL STRIP: NEGATIVE
BUN SERPL-MCNC: 14 MG/DL (ref 6–20)
CALCIUM SERPL-MCNC: 9.2 MG/DL (ref 8.7–10.5)
CHLORIDE SERPL-SCNC: 104 MMOL/L (ref 95–110)
CLARITY UR REFRACT.AUTO: CLEAR
CO2 SERPL-SCNC: 25 MMOL/L (ref 23–29)
COLOR UR AUTO: COLORLESS
CREAT SERPL-MCNC: 0.8 MG/DL (ref 0.5–1.4)
DIFFERENTIAL METHOD: ABNORMAL
EOSINOPHIL # BLD AUTO: 0.1 K/UL (ref 0–0.5)
EOSINOPHIL NFR BLD: 1.4 % (ref 0–8)
ERYTHROCYTE [DISTWIDTH] IN BLOOD BY AUTOMATED COUNT: 12.1 % (ref 11.5–14.5)
EST. GFR  (NO RACE VARIABLE): >60 ML/MIN/1.73 M^2
GLUCOSE SERPL-MCNC: 120 MG/DL (ref 70–110)
GLUCOSE UR QL STRIP: NEGATIVE
HCT VFR BLD AUTO: 39 % (ref 37–48.5)
HGB BLD-MCNC: 13 G/DL (ref 12–16)
HGB UR QL STRIP: NEGATIVE
IMM GRANULOCYTES # BLD AUTO: 0.01 K/UL (ref 0–0.04)
IMM GRANULOCYTES NFR BLD AUTO: 0.2 % (ref 0–0.5)
INFLUENZA A, MOLECULAR: NOT DETECTED
INFLUENZA B, MOLECULAR: NOT DETECTED
KETONES UR QL STRIP: NEGATIVE
LEUKOCYTE ESTERASE UR QL STRIP: NEGATIVE
LYMPHOCYTES # BLD AUTO: 2.8 K/UL (ref 1–4.8)
LYMPHOCYTES NFR BLD: 42.7 % (ref 18–48)
MCH RBC QN AUTO: 31 PG (ref 27–31)
MCHC RBC AUTO-ENTMCNC: 33.3 G/DL (ref 32–36)
MCV RBC AUTO: 93 FL (ref 82–98)
MONOCYTES # BLD AUTO: 0.5 K/UL (ref 0.3–1)
MONOCYTES NFR BLD: 8.2 % (ref 4–15)
NEUTROPHILS # BLD AUTO: 3.1 K/UL (ref 1.8–7.7)
NEUTROPHILS NFR BLD: 47 % (ref 38–73)
NITRITE UR QL STRIP: NEGATIVE
NRBC BLD-RTO: 0 /100 WBC
PH UR STRIP: 7 [PH] (ref 5–8)
PLATELET # BLD AUTO: 195 K/UL (ref 150–450)
PMV BLD AUTO: 8.8 FL (ref 9.2–12.9)
POTASSIUM SERPL-SCNC: 3.8 MMOL/L (ref 3.5–5.1)
PROT SERPL-MCNC: 6.9 G/DL (ref 6–8.4)
PROT UR QL STRIP: NEGATIVE
RBC # BLD AUTO: 4.19 M/UL (ref 4–5.4)
RSV AG BY MOLECULAR METHOD: NOT DETECTED
SARS-COV-2 RNA RESP QL NAA+PROBE: NOT DETECTED
SODIUM SERPL-SCNC: 139 MMOL/L (ref 136–145)
SP GR UR STRIP: 1.01 (ref 1–1.03)
TROPONIN I SERPL DL<=0.01 NG/ML-MCNC: <0.006 NG/ML (ref 0–0.03)
URN SPEC COLLECT METH UR: ABNORMAL
WBC # BLD AUTO: 6.48 K/UL (ref 3.9–12.7)

## 2023-08-30 PROCEDURE — 99285 EMERGENCY DEPT VISIT HI MDM: CPT | Mod: 25

## 2023-08-30 PROCEDURE — 0241U SARS-COV2 (COVID) WITH FLU/RSV BY PCR: CPT | Performed by: STUDENT IN AN ORGANIZED HEALTH CARE EDUCATION/TRAINING PROGRAM

## 2023-08-30 PROCEDURE — 80053 COMPREHEN METABOLIC PANEL: CPT | Performed by: STUDENT IN AN ORGANIZED HEALTH CARE EDUCATION/TRAINING PROGRAM

## 2023-08-30 PROCEDURE — 84484 ASSAY OF TROPONIN QUANT: CPT | Performed by: STUDENT IN AN ORGANIZED HEALTH CARE EDUCATION/TRAINING PROGRAM

## 2023-08-30 PROCEDURE — 96374 THER/PROPH/DIAG INJ IV PUSH: CPT

## 2023-08-30 PROCEDURE — 93010 ELECTROCARDIOGRAM REPORT: CPT | Mod: ,,, | Performed by: INTERNAL MEDICINE

## 2023-08-30 PROCEDURE — 63600175 PHARM REV CODE 636 W HCPCS: Performed by: STUDENT IN AN ORGANIZED HEALTH CARE EDUCATION/TRAINING PROGRAM

## 2023-08-30 PROCEDURE — 96361 HYDRATE IV INFUSION ADD-ON: CPT

## 2023-08-30 PROCEDURE — 93005 ELECTROCARDIOGRAM TRACING: CPT

## 2023-08-30 PROCEDURE — 81003 URINALYSIS AUTO W/O SCOPE: CPT | Performed by: STUDENT IN AN ORGANIZED HEALTH CARE EDUCATION/TRAINING PROGRAM

## 2023-08-30 PROCEDURE — 85025 COMPLETE CBC W/AUTO DIFF WBC: CPT | Performed by: STUDENT IN AN ORGANIZED HEALTH CARE EDUCATION/TRAINING PROGRAM

## 2023-08-30 PROCEDURE — 93010 EKG 12-LEAD: ICD-10-PCS | Mod: ,,, | Performed by: INTERNAL MEDICINE

## 2023-08-30 RX ORDER — EPINEPHRINE 0.3 MG/.3ML
1 INJECTION SUBCUTANEOUS ONCE
Qty: 0.3 ML | Refills: 0 | Status: SHIPPED | OUTPATIENT
Start: 2023-08-30 | End: 2023-08-30

## 2023-08-30 RX ORDER — ONDANSETRON 2 MG/ML
4 INJECTION INTRAMUSCULAR; INTRAVENOUS
Status: COMPLETED | OUTPATIENT
Start: 2023-08-30 | End: 2023-08-30

## 2023-08-30 RX ORDER — ONDANSETRON 4 MG/1
4 TABLET, ORALLY DISINTEGRATING ORAL EVERY 12 HOURS PRN
Qty: 10 TABLET | Refills: 0 | Status: SHIPPED | OUTPATIENT
Start: 2023-08-30 | End: 2023-09-04

## 2023-08-30 RX ADMIN — ONDANSETRON 4 MG: 2 INJECTION INTRAMUSCULAR; INTRAVENOUS at 01:08

## 2023-08-30 RX ADMIN — SODIUM CHLORIDE, POTASSIUM CHLORIDE, SODIUM LACTATE AND CALCIUM CHLORIDE 1000 ML: 600; 310; 30; 20 INJECTION, SOLUTION INTRAVENOUS at 11:08

## 2023-08-30 NOTE — ED TRIAGE NOTES
Pt arrives to ED via EMS with suspected allergic reaction> PT received solumedrol, epi and benadryl while in route. Pt has hx of MS. Pt was able to maintain airway but endorses SOB.

## 2023-08-30 NOTE — ED PROVIDER NOTES
Encounter Date: 8/30/2023       History     Chief Complaint   Patient presents with    Allergic Reaction     Arrives via EMS from home. C/o right eye swelling. Unsure of cause, no known bites/ stings from insects. No unknown medications ingested. Pt stated that she ate tacos about 30 minutes ago. Pt reported emesis. Pt arrives on 15L NRB and tremulous.      HPI    57-year-old female past medical history asthma, MS, multiple allergies who presents to the ER via EMS for evaluation of a possible allergic reaction.  Patient states that she was outside walking her dog and then came inside.  She suddenly developed lower lip swelling which progressed to a throat tightening sensation.  Two episodes of vomiting, and dizziness.  EMS arrived and administered Benadryl, Solu-Medrol, and a dose of IM epi.  In the ER she reports feeling better, however is slightly tremulous.  She does not recall any insect bites, or new foods/meds today.  She denies any fevers, chills, chest pain, syncope, headache, or any dysuria.      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 Jensen     Past Surgical History:   Procedure Laterality Date    APPENDECTOMY      AUGMENTATION OF BREAST       BREAST SURGERY      CHOLECYSTECTOMY       Family History   Problem Relation Age of Onset    Diabetes Mother     Cancer Mother 35        colon    Deep vein thrombosis Mother         during pregnancy    Multiple myeloma Mother     Stroke Father     Breast cancer Maternal Aunt 30    Breast cancer Maternal Aunt 50     Social History     Tobacco Use    Smoking status: Never    Smokeless tobacco: Never   Substance Use Topics    Alcohol use: Yes     Alcohol/week: 7.0 standard drinks of alcohol     Types: 7 Glasses of wine per week     Comment: champagne only/ occasional    Drug use: No     Review of Systems   Constitutional:  Negative for fever.   HENT:  Positive for sore throat and trouble swallowing.    Respiratory:  Negative for shortness of breath.    Cardiovascular:  Negative for chest pain.   Gastrointestinal:  Negative for nausea.   Genitourinary:  Negative for dysuria.   Musculoskeletal:  Negative for back pain.   Skin:  Negative for rash.   Neurological:  Positive for weakness.   Hematological:  Does not bruise/bleed easily.       Physical Exam     Initial Vitals [08/30/23 1127]   BP Pulse Resp Temp SpO2   (!) 170/95 60 20 97.7 °F (36.5 °C) 100 %      MAP       --         Physical Exam    Nursing note and vitals reviewed.  Constitutional: She appears well-developed and well-nourished.   HENT:   Head: Normocephalic and atraumatic.   Minimal swelling of the lips, no edema of the posterior oropharynx   Eyes: EOM are normal.   Neck: Neck supple.   Normal range of motion.  Cardiovascular:  Normal rate and regular rhythm.           Pulmonary/Chest: Breath sounds normal. No respiratory distress.   No stridor, airway is intact   Abdominal: Abdomen is soft. There is no abdominal tenderness.   Musculoskeletal:         General: No edema. Normal range of motion.      Cervical back: Normal range of motion and neck supple.     Neurological: She is alert and oriented to person, place, and time.   Skin: Skin is warm and dry.    Psychiatric:   Anxious, cooperative         ED Course   Procedures  Labs Reviewed   CBC W/ AUTO DIFFERENTIAL - Abnormal; Notable for the following components:       Result Value    MPV 8.8 (*)     All other components within normal limits   COMPREHENSIVE METABOLIC PANEL - Abnormal; Notable for the following components:    Glucose 120 (*)     All other components within normal limits   URINALYSIS, REFLEX TO URINE CULTURE - Abnormal; Notable for the following components:    Color, UA Colorless (*)     All other components within normal limits    Narrative:     Specimen Source->Urine   TROPONIN I   SARS-COV2 (COVID) WITH FLU/RSV BY PCR        ECG Results              EKG 12-lead (Final result)  Result time 08/30/23 12:42:04      Final result by Interface, Lab In [facility] (08/30/23 12:42:04)                   Narrative:    Test Reason : R07.9,    Vent. Rate : 075 BPM     Atrial Rate : 075 BPM     P-R Int : 120 ms          QRS Dur : 040 ms      QT Int : 280 ms       P-R-T Axes : 000 -42 173 degrees     QTc Int : 313 ms    artifact  Normal sinus rhythm  Low QRS voltage  Left axis deviation  Cannot rule out Inferior infarct (cited on or before 21-MAY-2019)  Cannot rule out Anterior infarct (cited on or before 12-DEC-2018)  Nonspecific ST and/or T wave abnormalities  Abnormal ECG  When compared with ECG of 21-MAY-2019 14:21,  Criteria for anterior infarct now present  Confirmed by Macrina Washington MD ([id number]) on 8/30/2023 12:41:55 PM    Referred By: [email or web address]   SELF           Confirmed By:Macrina Washington MD                                  Imaging Results              X-Ray Chest AP Portable (Final result)  Result time 08/30/23 12:22:38      Final result by Jarvis Ramon MD (08/30/23 12:22:38)                   Impression:      See above      Electronically signed by: Jarvis Ramon MD  Date:    08/30/2023  Time:    12:22               Narrative:    EXAMINATION:  XR CHEST AP PORTABLE    CLINICAL HISTORY:  near  syncope;    TECHNIQUE:  Single frontal view of the chest was performed.    COMPARISON:  N 05/21/2019 one    FINDINGS:  Heart size normal.  The lungs are clear.  No pleural effusion                                       Medications   lactated ringers bolus 1,000 mL (0 mLs Intravenous Stopped 8/30/23 1323)   ondansetron injection 4 mg (4 mg Intravenous Given 8/30/23 1342)     Medical Decision Making  57-year-old female presenting with an allergic reaction by EMS.  On arrival slightly tremulous, otherwise vitals are intact, airway intact, no significant angioedema.  She already received Benadryl, IV steroids, and IM epi prior to arrival.  Stable and no need for further epi at this time.  Labs with no leukocytosis, electrolytes were normal, troponin normal.  EKG normal, some motion artifact.  She was treated with IV fluids, and Zofran; observed for 4 hours without worsening allergic rxn or anaphylaxis. On re-evaluation she was feeling much better.  Amenable for discharge, follow up with primary care doctor, strict return to ER precautions given; all questions answered, she expressed understanding.  Patient was re-prescribed an EpiPen for home.  HTN and tachycardia normalized.    Amount and/or Complexity of Data Reviewed  Labs: ordered. Decision-making details documented in ED Course.  Radiology: ordered.  ECG/medicine tests: ordered and independent interpretation performed. Decision-making details documented in ED Course.    Risk  Prescription drug management.                               EKG  Rate 120s   Sinus tachycardia  Significant motion artifact limiting interpretation      Clinical Impression:   Final diagnoses:  [T78.40XA] Allergic reaction, initial encounter (Primary)        ED Disposition Condition    Discharge Stable          ED Prescriptions       Medication Sig Dispense Start Date End Date Auth. Provider    ondansetron (ZOFRAN-ODT) 4 MG TbDL Take 1 tablet (4 mg total) by mouth every 12 (twelve) hours as  needed (Nausea). 10 tablet 8/30/2023 9/4/2023 Michael Jones MD          Follow-up Information       Follow up With Specialties Details Why Contact Info    TAMANNA Guallpa MD Internal Medicine   [address]  [facility] [address]  [phone]      [facility] - Emergency Dept Emergency Medicine  As needed, If symptoms worsen [address] [facility] [address] [phone]             Michael Jones MD  08/30/23 1600       Michael Jones MD  08/30/23 [id number]

## 2023-08-30 NOTE — DISCHARGE INSTRUCTIONS
Follow-up with your doctor, return for any new or worsening vomiting, swelling, trouble breathing, or rash. Use your epipen as needed, this was prescribed as well.

## 2023-09-09 ENCOUNTER — HOSPITAL ENCOUNTER (EMERGENCY)
Facility: HOSPITAL | Age: 58
Discharge: HOME | End: 2023-09-09
Attending: EMERGENCY MEDICINE
Payer: COMMERCIAL

## 2023-09-09 VITALS
SYSTOLIC BLOOD PRESSURE: 144 MMHG | TEMPERATURE: 98 F | DIASTOLIC BLOOD PRESSURE: 79 MMHG | BODY MASS INDEX: 19.29 KG/M2 | HEIGHT: 66 IN | OXYGEN SATURATION: 100 % | RESPIRATION RATE: 18 BRPM | HEART RATE: 64 BPM | WEIGHT: 120 LBS

## 2023-09-09 DIAGNOSIS — I77.810 ASCENDING AORTA DILATION: Primary | ICD-10-CM

## 2023-09-09 DIAGNOSIS — R11.2 NAUSEA AND VOMITING, UNSPECIFIED VOMITING TYPE: ICD-10-CM

## 2023-09-09 DIAGNOSIS — N39.0 URINARY TRACT INFECTION WITHOUT HEMATURIA, SITE UNSPECIFIED: ICD-10-CM

## 2023-09-09 DIAGNOSIS — R29.898 WEAKNESS OF LOWER EXTREMITY, UNSPECIFIED LATERALITY: ICD-10-CM

## 2023-09-09 DIAGNOSIS — G35 MS (MULTIPLE SCLEROSIS): ICD-10-CM
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
## 2023-09-09 DIAGNOSIS — M51.27 HERNIATION OF INTERVERTEBRAL DISC BETWEEN L5 AND S1: ICD-10-CM

## 2023-09-09 LAB
ABO + RH BLD: NORMAL
ALBUMIN SERPL BCP-MCNC: 4.2 G/DL (ref 3.5–5.2)
ALP SERPL-CCNC: 58 U/L (ref 55–135)
ALT SERPL W/O P-5'-P-CCNC: 18 U/L (ref 10–44)
ANION GAP SERPL CALC-SCNC: 9 MMOL/L (ref 8–16)
APTT PPP: 26.5 SEC (ref 21–32)
AST SERPL-CCNC: 23 U/L (ref 10–40)
BACTERIA #/AREA URNS AUTO: ABNORMAL /HPF
BASOPHILS # BLD AUTO: 0.02 K/UL (ref 0–0.2)
BASOPHILS NFR BLD: 0.4 % (ref 0–1.9)
BILIRUB SERPL-MCNC: 0.9 MG/DL (ref 0.1–1)
BILIRUB UR QL STRIP: NEGATIVE
BLD GP AB SCN CELLS X3 SERPL QL: NORMAL
BUN SERPL-MCNC: 13 MG/DL (ref 6–20)
CALCIUM SERPL-MCNC: 9.5 MG/DL (ref 8.7–10.5)
CHLORIDE SERPL-SCNC: 104 MMOL/L (ref 95–110)
CK SERPL-CCNC: 53 U/L (ref 20–180)
CLARITY UR REFRACT.AUTO: CLEAR
CO2 SERPL-SCNC: 26 MMOL/L (ref 23–29)
COLOR UR AUTO: YELLOW
CREAT SERPL-MCNC: 0.8 MG/DL (ref 0.5–1.4)
CRP SERPL-MCNC: <0.3 MG/L (ref 0–8.2)
DIFFERENTIAL METHOD: ABNORMAL
EOSINOPHIL # BLD AUTO: 0.1 K/UL (ref 0–0.5)
EOSINOPHIL NFR BLD: 1.6 % (ref 0–8)
ERYTHROCYTE [DISTWIDTH] IN BLOOD BY AUTOMATED COUNT: 12 % (ref 11.5–14.5)
ERYTHROCYTE [SEDIMENTATION RATE] IN BLOOD BY PHOTOMETRIC METHOD: 2 MM/HR (ref 0–36)
EST. GFR  (NO RACE VARIABLE): >60 ML/MIN/1.73 M^2
GLUCOSE SERPL-MCNC: 80 MG/DL (ref 70–110)
GLUCOSE UR QL STRIP: NEGATIVE
HCT VFR BLD AUTO: 39.1 % (ref 37–48.5)
HGB BLD-MCNC: 13.2 G/DL (ref 12–16)
HGB UR QL STRIP: NEGATIVE
IMM GRANULOCYTES # BLD AUTO: 0.01 K/UL (ref 0–0.04)
IMM GRANULOCYTES NFR BLD AUTO: 0.2 % (ref 0–0.5)
INR PPP: 1 (ref 0.8–1.2)
KETONES UR QL STRIP: NEGATIVE
LEUKOCYTE ESTERASE UR QL STRIP: ABNORMAL
LYMPHOCYTES # BLD AUTO: 1.4 K/UL (ref 1–4.8)
LYMPHOCYTES NFR BLD: 24.2 % (ref 18–48)
MCH RBC QN AUTO: 31.7 PG (ref 27–31)
MCHC RBC AUTO-ENTMCNC: 33.8 G/DL (ref 32–36)
MCV RBC AUTO: 94 FL (ref 82–98)
MICROSCOPIC COMMENT: ABNORMAL
MONOCYTES # BLD AUTO: 0.5 K/UL (ref 0.3–1)
MONOCYTES NFR BLD: 8.9 % (ref 4–15)
NEUTROPHILS # BLD AUTO: 3.6 K/UL (ref 1.8–7.7)
NEUTROPHILS NFR BLD: 64.7 % (ref 38–73)
NITRITE UR QL STRIP: NEGATIVE
NRBC BLD-RTO: 0 /100 WBC
PH UR STRIP: 6 [PH] (ref 5–8)
PLATELET # BLD AUTO: 159 K/UL (ref 150–450)
PMV BLD AUTO: 8.7 FL (ref 9.2–12.9)
POTASSIUM SERPL-SCNC: 4.4 MMOL/L (ref 3.5–5.1)
PROT SERPL-MCNC: 7.1 G/DL (ref 6–8.4)
PROT UR QL STRIP: NEGATIVE
PROTHROMBIN TIME: 11.1 SEC (ref 9–12.5)
RBC # BLD AUTO: 4.17 M/UL (ref 4–5.4)
RBC #/AREA URNS AUTO: 2 /HPF (ref 0–4)
SODIUM SERPL-SCNC: 139 MMOL/L (ref 136–145)
SP GR UR STRIP: 1.01 (ref 1–1.03)
SPECIMEN OUTDATE: NORMAL
SQUAMOUS #/AREA URNS AUTO: 1 /HPF
URN SPEC COLLECT METH UR: ABNORMAL
WBC # BLD AUTO: 5.61 K/UL (ref 3.9–12.7)
WBC #/AREA URNS AUTO: 13 /HPF (ref 0–5)

## 2023-09-09 PROCEDURE — 96376 TX/PRO/DX INJ SAME DRUG ADON: CPT

## 2023-09-09 PROCEDURE — 86140 C-REACTIVE PROTEIN: CPT | Performed by: PHYSICIAN ASSISTANT

## 2023-09-09 PROCEDURE — 63600175 PHARM REV CODE 636 W HCPCS: Performed by: EMERGENCY MEDICINE

## 2023-09-09 PROCEDURE — 99284 PR EMERGENCY DEPT VISIT,LEVEL IV: ICD-10-PCS | Mod: ,,, | Performed by: PSYCHIATRY & NEUROLOGY

## 2023-09-09 PROCEDURE — 85730 THROMBOPLASTIN TIME PARTIAL: CPT | Performed by: STUDENT IN AN ORGANIZED HEALTH CARE EDUCATION/TRAINING PROGRAM

## 2023-09-09 PROCEDURE — 96374 THER/PROPH/DIAG INJ IV PUSH: CPT

## 2023-09-09 PROCEDURE — 82550 ASSAY OF CK (CPK): CPT | Performed by: PHYSICIAN ASSISTANT

## 2023-09-09 PROCEDURE — 87086 URINE CULTURE/COLONY COUNT: CPT | Performed by: PHYSICIAN ASSISTANT

## 2023-09-09 PROCEDURE — 85652 RBC SED RATE AUTOMATED: CPT | Performed by: PHYSICIAN ASSISTANT

## 2023-09-09 PROCEDURE — 86850 RBC ANTIBODY SCREEN: CPT | Performed by: STUDENT IN AN ORGANIZED HEALTH CARE EDUCATION/TRAINING PROGRAM

## 2023-09-09 PROCEDURE — 80053 COMPREHEN METABOLIC PANEL: CPT | Performed by: PHYSICIAN ASSISTANT

## 2023-09-09 PROCEDURE — 99285 EMERGENCY DEPT VISIT HI MDM: CPT | Mod: 25

## 2023-09-09 PROCEDURE — 63600175 PHARM REV CODE 636 W HCPCS: Performed by: PHYSICIAN ASSISTANT

## 2023-09-09 PROCEDURE — 85025 COMPLETE CBC W/AUTO DIFF WBC: CPT | Performed by: PHYSICIAN ASSISTANT

## 2023-09-09 PROCEDURE — 85610 PROTHROMBIN TIME: CPT | Performed by: STUDENT IN AN ORGANIZED HEALTH CARE EDUCATION/TRAINING PROGRAM

## 2023-09-09 PROCEDURE — 25500020 PHARM REV CODE 255: Performed by: EMERGENCY MEDICINE

## 2023-09-09 PROCEDURE — 99284 EMERGENCY DEPT VISIT MOD MDM: CPT | Mod: ,,, | Performed by: PSYCHIATRY & NEUROLOGY

## 2023-09-09 PROCEDURE — 25000003 PHARM REV CODE 250: Performed by: PHYSICIAN ASSISTANT

## 2023-09-09 PROCEDURE — A9585 GADOBUTROL INJECTION: HCPCS | Performed by: EMERGENCY MEDICINE

## 2023-09-09 PROCEDURE — 81001 URINALYSIS AUTO W/SCOPE: CPT | Performed by: PHYSICIAN ASSISTANT

## 2023-09-09 PROCEDURE — 96375 TX/PRO/DX INJ NEW DRUG ADDON: CPT

## 2023-09-09 RX ORDER — MORPHINE SULFATE 2 MG/ML
2 INJECTION, SOLUTION INTRAMUSCULAR; INTRAVENOUS
Status: COMPLETED | OUTPATIENT
Start: 2023-09-09 | End: 2023-09-09

## 2023-09-09 RX ORDER — ONDANSETRON 2 MG/ML
4 INJECTION INTRAMUSCULAR; INTRAVENOUS
Status: COMPLETED | OUTPATIENT
Start: 2023-09-09 | End: 2023-09-09

## 2023-09-09 RX ORDER — ONDANSETRON 4 MG/1
4 TABLET, ORALLY DISINTEGRATING ORAL
Status: COMPLETED | OUTPATIENT
Start: 2023-09-09 | End: 2023-09-09

## 2023-09-09 RX ORDER — GADOBUTROL 604.72 MG/ML
6 INJECTION INTRAVENOUS
Status: COMPLETED | OUTPATIENT
Start: 2023-09-09 | End: 2023-09-09

## 2023-09-09 RX ORDER — OMEPRAZOLE 10 MG/1
10 CAPSULE, DELAYED RELEASE ORAL DAILY
COMMUNITY

## 2023-09-09 RX ORDER — CIPROFLOXACIN 250 MG/1
250 TABLET, FILM COATED ORAL 2 TIMES DAILY
Qty: 6 TABLET | Refills: 0 | Status: SHIPPED | OUTPATIENT
Start: 2023-09-09 | End: 2023-09-12

## 2023-09-09 RX ORDER — METHOCARBAMOL 750 MG/1
750 TABLET, FILM COATED ORAL
Status: COMPLETED | OUTPATIENT
Start: 2023-09-09 | End: 2023-09-09

## 2023-09-09 RX ORDER — ONDANSETRON 4 MG/1
4 TABLET, ORALLY DISINTEGRATING ORAL EVERY 8 HOURS PRN
Qty: 16 TABLET | Refills: 0 | Status: SHIPPED | OUTPATIENT
Start: 2023-09-09 | End: 2024-03-11

## 2023-09-09 RX ORDER — METHOCARBAMOL 500 MG/1
500 TABLET, FILM COATED ORAL 3 TIMES DAILY
Qty: 21 TABLET | Refills: 0 | Status: SHIPPED | OUTPATIENT
Start: 2023-09-09 | End: 2023-09-16

## 2023-09-09 RX ADMIN — METHOCARBAMOL 750 MG: 750 TABLET ORAL at 06:09

## 2023-09-09 RX ADMIN — ONDANSETRON 4 MG: 2 INJECTION INTRAMUSCULAR; INTRAVENOUS at 12:09

## 2023-09-09 RX ADMIN — MORPHINE SULFATE 2 MG: 2 INJECTION, SOLUTION INTRAMUSCULAR; INTRAVENOUS at 08:09

## 2023-09-09 RX ADMIN — ONDANSETRON 4 MG: 2 INJECTION INTRAMUSCULAR; INTRAVENOUS at 09:09

## 2023-09-09 RX ADMIN — ONDANSETRON 4 MG: 4 TABLET, ORALLY DISINTEGRATING ORAL at 06:09

## 2023-09-09 RX ADMIN — ONDANSETRON 4 MG: 2 INJECTION INTRAMUSCULAR; INTRAVENOUS at 05:09

## 2023-09-09 RX ADMIN — MORPHINE SULFATE 2 MG: 2 INJECTION, SOLUTION INTRAMUSCULAR; INTRAVENOUS at 12:09

## 2023-09-09 RX ADMIN — GADOBUTROL 6 ML: 604.72 INJECTION INTRAVENOUS at 10:09

## 2023-09-09 NOTE — HPI
Hailey Sullivan is a 57 year old female with history of RRMS (not being actively followed or treated by neurologist), mild aortic/mitral regurgitation, GERD, bladder prolapse, asthma, and hypothyroidism who presents with bilateral leg weakness and pain. She recently went to the ED 08/30 for anaphylaxis secondary to insect bite. Since leaving the ED she has had consistent pain and weakness in both of her legs. She requires a cane at home for ambulation. She has had several falls in the interim, one in the bathroom and another walking down the stairs, after being tripped by dog; she fell on her buttocks, not her head. She describes the pain as burning 10/10. Morphine helped in the emergency department though she feels very nauseous requiring zofran. Neurology was consulted for bilateral lower extremity pain and weakness with history of MS.

## 2023-09-09 NOTE — PROVIDER PROGRESS NOTES - EMERGENCY DEPT.
Encounter Date: 9/9/2023    ED Physician Progress Notes        Physician Note:   I received signout of this patient from Abdiel Cancino PA-C due to shift change.  Pending neurology evaluation. Neurology evaluated patient and does not believe symptoms are due to MS.  Recommends close follow-up with Neurology outpatient as patient has not followed up in several years.  L5-S1 disc herniation noted on MRI today.  Neurosurgery consulted earlier and does not believe her symptoms are explained by this finding.  I offered admission to this patient as no cause of lower extremity weakness identified at this time.  Patient declined admission and would like to be discharged home.  Patient agreeable to sign AMA form as it was recommended by the ED and neurosurgery department to be admitted for further evaluation and PT/OT.  Form signed and witnessed by nursing staff. Referral placed with Neurology and PT/OT outpatient.  Symptomatic treatment provided with muscle relaxer, Zofran. Strict ED precautions were given to return for new, concerning, worsening symptoms.

## 2023-09-09 NOTE — ED TRIAGE NOTES
""MS " exacerbation  x 2 weeks since an anaphylaxis  episode  about 2 wks. Hx  of falls   , uses a cane to walk. Having pain in lower back/hips. Pain is getting worse.   "

## 2023-09-09 NOTE — SUBJECTIVE & OBJECTIVE
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 Jensen     Past Surgical History:   Procedure Laterality Date    APPENDECTOMY      AUGMENTATION OF BREAST      BREAST SURGERY      CHOLECYSTECTOMY       Family History       Problem Relation (Age of Onset)    Breast cancer Maternal Aunt (30), Maternal Aunt (50)    Cancer Mother (35)    Deep vein thrombosis Mother    Diabetes Mother    Multiple myeloma Mother    Stroke Father          Tobacco Use    Smoking status: Never    Smokeless tobacco: Never   Substance and Sexual Activity    Alcohol use: Yes     Alcohol/week: 7.0 standard drinks of alcohol     Types: 7 Glasses of wine per week     Comment: champagne only/ occasional    Drug use: No    Sexual activity: Yes     Partners: Male     Review of Systems   Constitutional:  Negative for chills and fever.   HENT:  Negative for trouble swallowing and voice change.    Eyes:  Negative for photophobia and visual disturbance.   Respiratory:  Negative for chest tightness and shortness of breath.    Cardiovascular:  Negative for chest pain and leg swelling.   Gastrointestinal:  Negative for nausea and vomiting.   Genitourinary:  Positive for  frequency.   Musculoskeletal:  Positive for arthralgias, back pain, gait problem and myalgias. Negative for neck pain.   Neurological:  Positive for weakness.     Objective:     Weight: 54.4 kg (120 lb)  Body mass index is 19.37 kg/m².  Vital Signs (Most Recent):  Temp: 98 °F (36.7 °C) (09/09/23 1224)  Pulse: 60 (09/09/23 1224)  Resp: 15 (09/09/23 1256)  BP: (!) 103/56 (09/09/23 1224)  SpO2: 98 % (09/09/23 1224) Vital Signs (24h Range):  Temp:  [98 °F (36.7 °C)-98.2 °F (36.8 °C)] 98 °F (36.7 °C)  Pulse:  [60-65] 60  Resp:  [15-20] 15  SpO2:  [98 %-100 %] 98 %  BP: (103-143)/(56-79) 103/56                                 Physical Exam         Neurosurgery Physical Exam  General: mild distress  Head: Non-traumatic, normocephalic  Eyes: Pupils equal, EOMi  Neck: Supple, normal ROM, no tenderness to palpation  CVS: Normal rate and rhythm, distal pulses present  Pulm: Symmetric expansion, no respiratory distress  GI: Abdomen nondistended, nontender  MSK: Moves all extremities without restriction, atraumatic  Skin: Dry, intact  Psych: Normal thought content and cognition; tearful  Neuro: AOx3, GCS [id number]  CNII-XII: Intact on fine exam, PERRL, EOMi, facial sensation preserved, no facial asymmetry, tongue/uvula/palate midline, shoulder shrug equal, No pronator drift  Extremities:  Motor:  Upper Extremity:                             Deltoids        Triceps        Biceps        WE        WF                Interosseous           R        5/5              5/5              5/5            5/5         5/5         5/5                5/5           L        5/5              5/5              5/5            5/5         5/5         5/5                5/5  Lower Extremity: pain limited BLE movement R>L, low back/hip pain w/movement                                      HF       KE        KF        DF        PF        EHL           R       4-/5      4/5        4+/5     4-/5        4+/5     4/5           L       5/5        4/5         4+/5       4+/5     4+/5      4/5    Reflexes:     DTR: 2+ and symmetrically throughout     Daniel's: Negative     Babinski's: Negative     Clonus: Negative    Sensory:      Sensorium intact throughout, no sensory level present    Coordination:      Coordination intact throughout       Cervical Spine:      ROM: Full with flexion, extension, lateral rotation and ear-to-shoulder bend.      Midline TTP: Negative     Thoracic Spine:     Midline TTP: Negative     Lumbar Spine:     Midline TTP: Negative     Straight Leg Test: +R        Significant Labs:  Recent Labs   Lab 09/09/23 0820   GLU 80      K 4.4      CO2 26   BUN 13   CREATININE 0.8   CALCIUM 9.5     Recent Labs   Lab 09/09/23 0820   WBC 5.61   HGB 13.2   HCT 39.1        Recent Labs   Lab 09/09/23  1302   INR 1.0   APTT 26.5     Microbiology Results (last 7 days)       Procedure Component Value Units Date/Time    Urine culture [[id number]] Collected: 09/09/23 0838    Order Status: No result Specimen: Urine Updated: 09/09/23 0903          All pertinent labs from the last 24 hours have been reviewed.    Significant Diagnostics:  I have reviewed and interpreted all pertinent imaging results/findings within the past 24 hours.  MRI Cervical Spine Demyelinating W W/O Contrast    Result Date: 9/9/2023  Allowing for artifact from motion no definite edema signal abnormality within the spinal cord to suggest sequela of demyelination or active myelitis.  There is no abnormal cord enhancement There are degenerative changes as detailed above with L5/S1 posterior disc osteophyte and superimposed right paracentral disc extrusion.  Mild resulting central canal stenosis with probable impression on the descending right S1 nerve root clinical correlation for right S1 nerve root radiculopathy advised Incidental probable enlargement ascending aorta partially visualized measuring approximately 4.1 cm. Please see above for additional details. Electronically signed  by: Eric Montoya DO Date:    09/09/2023 Time:    11:38    MRI Thoracic Spine Demyelinating W W/O Contrast    Result Date: 9/9/2023  Allowing for artifact from motion no definite edema signal abnormality within the spinal cord to suggest sequela of demyelination or active myelitis.  There is no abnormal cord enhancement There are degenerative changes as detailed above with L5/S1 posterior disc osteophyte and superimposed right paracentral disc extrusion.  Mild resulting central canal stenosis with probable impression on the descending right S1 nerve root clinical correlation for right S1 nerve root radiculopathy advised Incidental probable enlargement ascending aorta partially visualized measuring approximately 4.1 cm. Please see above for additional details. Electronically signed by: Eric Montoya DO Date:    09/09/2023 Time:    11:38    MRI Lumbar Spine W WO Cont    Result Date: 9/9/2023  Allowing for artifact from motion no definite edema signal abnormality within the spinal cord to suggest sequela of demyelination or active myelitis.  There is no abnormal cord enhancement There are degenerative changes as detailed above with L5/S1 posterior disc osteophyte and superimposed right paracentral disc extrusion.  Mild resulting central canal stenosis with probable impression on the descending right S1 nerve root clinical correlation for right S1 nerve root radiculopathy advised Incidental probable enlargement ascending aorta partially visualized measuring approximately 4.1 cm. Please see above for additional details. Electronically signed by: Eric Montoya DO Date:    09/09/2023 Time:    11:38    MRI Brain Demyelinating W W/O Contrast    Result Date: 9/9/2023  Few scattered small sized foci of T2 FLAIR signal hyperintensity within the supratentorial white matter most prominent clustered in the left posterior temporoparietal subcortical white matter.  These are nonspecific and prior areas of demyelination to be  considered in differential in light of history. No evidence for acute infarction or enhancing lesion to suggest active demyelination. Clinical correlation advised. Electronically signed by: Eric Montoya DO Date:    09/09/2023 Time:    11:35 .

## 2023-09-09 NOTE — ED PROVIDER NOTES
"Encounter Date: 9/9/2023       History     Chief Complaint   Patient presents with    Leg Pain     C/o pain from waist down since hx of falls approx x2 weeks, pain increasingly worsening, severe today, also reports hx of MS and afraid she may have spinal lesions now     57-year-old female with history of GERD, MS presents to the ED complaining of bilateral leg pain.  She was seen in this ED 8/30 for anaphylaxis.  Since then, has had progressive pain and weakness to the bilateral legs.  For the past week has been ambulating with a cane which is not her baseline.  This unsteady gait and weakness has resulted in multiple falls.  Pain has been progressively worsening, significantly worse last night, 10/10 "burning" to the bilateral upper legs.  She is taken ibuprofen and applied a heating pad with no relief.  She reports nausea secondary to pain, improved with Zofran.  She does have some dysuria but has known bladder prolapse, working to have surgery scheduled.  She is not currently followed by Neurology, was discharged from the clinic because she did not want to take medications for her MS.  Symptoms have been diet controlled.  Denies fever, chest pain, shortness of breath, abdominal pain, swelling.     The history is provided by the patient and medical records. No  was used.     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 Jensen     Past Surgical History:   Procedure Laterality Date    APPENDECTOMY      AUGMENTATION OF BREAST      BREAST SURGERY      CHOLECYSTECTOMY       Family History   Problem Relation Age of Onset    Diabetes Mother     Cancer Mother 35        colon    Deep vein thrombosis Mother         during pregnancy    Multiple myeloma Mother     Stroke Father     Breast cancer Maternal Aunt 30    Breast cancer Maternal Aunt 50     Social History     Tobacco Use    Smoking status: Never    Smokeless tobacco: Never   Substance Use Topics    Alcohol use: Yes     Alcohol/week: 7.0 standard drinks of alcohol     Types: 7 Glasses of wine per week     Comment: champagne only/ occasional    Drug use: No     Review of Systems   Constitutional:  Negative for chills and fever.   Respiratory:  Negative for shortness of breath.    Cardiovascular:  Negative for chest pain.   Gastrointestinal:  Positive for nausea and vomiting. Negative for abdominal pain.   Genitourinary:  Positive for dysuria. Negative for hematuria.   Musculoskeletal:  Positive for arthralgias, gait problem and myalgias.   Skin:  Negative for rash.   Neurological:  Positive for weakness. Negative for headaches.   Psychiatric/Behavioral:  Negative for confusion.        Physical Exam     Initial Vitals [09/09/23 0658]   BP Pulse Resp Temp SpO2   (!) 143/76 65 18 98.1 °F (36.7 °C) 100 %      MAP       --         Physical Exam    Nursing note and vitals reviewed.  Constitutional: She appears well-developed and well-nourished. She is not diaphoretic. No distress.   HENT:   Head: Normocephalic and atraumatic.   Cardiovascular:  Normal rate, regular rhythm and normal heart sounds.     Exam reveals no gallop and no friction rub.       No murmur heard.  Pulmonary/Chest: Breath sounds normal. She  has no wheezes. She has no rhonchi. She has no rales.   Abdominal: Abdomen is soft. There is no abdominal tenderness. There is no rebound and no guarding.     Neurological: She is alert and oriented to person, place, and time. No sensory deficit. GCS score is 15. GCS eye subscore is 4. GCS verbal subscore is 5. GCS motor subscore is 6.   Weakness noted to the bilateral LE 3/5, equal bilaterally. Pedal pulses 2+. Normal sensation   Skin: Skin is warm and dry. No rash noted. No erythema.   Psychiatric: She has a normal mood and affect.         ED Course   Procedures  Labs Reviewed   CBC W/ AUTO DIFFERENTIAL - Abnormal; Notable for the following components:       Result Value    MCH 31.7 (*)     MPV 8.7 (*)     All other components within normal limits   URINALYSIS, REFLEX TO URINE CULTURE - Abnormal; Notable for the following components:    Leukocytes, UA 2+ (*)     All other components within normal limits    Narrative:     Specimen Source->Urine   URINALYSIS MICROSCOPIC - Abnormal; Notable for the following components:    WBC, UA 13 (*)     All other components within normal limits    Narrative:     Specimen Source->Urine   CULTURE, URINE   COMPREHENSIVE METABOLIC PANEL   CK   SEDIMENTATION RATE   C-REACTIVE PROTEIN   PROTIME-INR   APTT   TYPE & SCREEN          Imaging Results              X-Ray Sacrum And Coccyx (Final result)  Result time 09/09/23 14:50:21      Final result by Michael Nicholas Jr., MD (09/09/23 14:50:21)                   Impression:      See above      Electronically signed by: Michael Nicholas MD  Date:    09/09/2023  Time:    14:50               Narrative:    EXAMINATION:  XR SACRUM AND COCCYX    CLINICAL HISTORY:  Unspecified fall, initial encounter    TECHNIQUE:  Two or three views of the sacrum and coccyx were performed.    COMPARISON:  None    FINDINGS:  Bones are fairly well mineralized.  Stool and bowel gas overlies the sacrum and coccyx.  No convincing fracture.                                        X-Ray Hips Bilateral 2 View Incl AP Pelvis (Final result)  Result time 09/09/23 14:48:29      Final result by Michael Nicholas Jr., MD (09/09/23 14:48:29)                   Impression:      See above      Electronically signed by: Michael Nicholas MD  Date:    09/09/2023  Time:    14:48               Narrative:    EXAMINATION:  XR HIPS BILATERAL 2 VIEW INCL AP PELVIS    CLINICAL HISTORY:  Unspecified fall, initial encounter    TECHNIQUE:  AP view of the pelvis and frogleg lateral views of both hips were performed.    COMPARISON:  None.    FINDINGS:  Bones are well mineralized.  Alignment appears satisfactory.  Scattered stool and bowel gas noted.  No fracture.                                       MRI Brain Demyelinating W W/O Contrast (Final result)  Result time 09/09/23 11:35:56      Final result by Eric Montoya DO (09/09/23 11:35:56)                   Impression:      Few scattered small sized foci of T2 FLAIR signal hyperintensity within the supratentorial white matter most prominent clustered in the left posterior temporoparietal subcortical white matter.  These are nonspecific and prior areas of demyelination to be considered in differential in light of history.    No evidence for acute infarction or enhancing lesion to suggest active demyelination.    Clinical correlation advised.      Electronically signed by: Eric Montoya DO  Date:    09/09/2023  Time:    11:35               Narrative:    EXAMINATION:  MRI BRAIN DEMYELINATING W/ WO CONTRAST    CLINICAL HISTORY:  h/o MS, weakness and pain to legs;    TECHNIQUE:  Sagittal 3D space FLAIR which was reformatted in the coronal and sagittal planes.  Axial T2, axial gradient, axial T1 and axial diffusion imaging of the whole brain without contrast.  Following contrast administration axial T1 and sagittal T1 spoiled gradient which was reformatted in the coronal and axial planes were performed.  Six ML Gadavist intravenous contrast.    COMPARISON:  CT  head 12/12/2018    FINDINGS:  There is no restricted diffusion to suggest acute or recent infarction.  There are few scattered small to punctate size foci of T2 FLAIR signal hyperintensity within the supratentorial parenchyma most numerous clustered in the left parietal and posterior temporal subcortical white matter.  No abnormal parenchymal susceptibility to suggest parenchymal hemorrhage.  Major intracranial skull base T2 flow voids are present.  There is no mass effect or midline shift.  Major intracranial skull base T2 flow voids are present.  No abnormal parenchymal susceptibility to suggest parenchymal hemorrhage. Mild left maxillary mucosal thickening with trace ethmoid air cell opacities bilaterally.                        Final result by Eric Montoya DO (09/09/23 11:35:56)                   Impression:      Few scattered small sized foci of T2 FLAIR signal hyperintensity within the supratentorial white matter most prominent clustered in the left posterior temporoparietal subcortical white matter.  These are nonspecific and prior areas of demyelination to be considered in differential in light of history.    No evidence for acute infarction or enhancing lesion to suggest active demyelination.    Clinical correlation advised.      Electronically signed by: Eric Montoya DO  Date:    09/09/2023  Time:    11:35               Narrative:    EXAMINATION:  MRI BRAIN DEMYELINATING W/ WO CONTRAST    CLINICAL HISTORY:  h/o MS, weakness and pain to legs;    TECHNIQUE:  Sagittal 3D space FLAIR which was reformatted in the coronal and sagittal planes.  Axial T2, axial gradient, axial T1 and axial diffusion imaging of the whole brain without contrast.  Following contrast administration axial T1 and sagittal T1 spoiled gradient which was reformatted in the coronal and axial planes were performed.  Six ML Gadavist intravenous contrast.    COMPARISON:  CT head 12/12/2018    FINDINGS:  There is no restricted diffusion  to suggest acute or recent infarction.  There are few scattered small to punctate size foci of T2 FLAIR signal hyperintensity within the supratentorial parenchyma most numerous clustered in the left parietal and posterior temporal subcortical white matter.  No abnormal parenchymal susceptibility to suggest parenchymal hemorrhage.  Major intracranial skull base T2 flow voids are present.  There is no mass effect or midline shift.  Major intracranial skull base T2 flow voids are present.  No abnormal parenchymal susceptibility to suggest parenchymal hemorrhage. Mild left maxillary mucosal thickening with trace ethmoid air cell opacities bilaterally.                                        MRI Lumbar Spine W WO Cont (Final result)  Result time 09/09/23 11:38:15      Final result by Eric Montoya DO (09/09/23 11:38:15)                   Impression:      Allowing for artifact from motion no definite edema signal abnormality within the spinal cord to suggest sequela of demyelination or active myelitis.  There is no abnormal cord enhancement    There are degenerative changes as detailed above with L5/S1 posterior disc osteophyte and superimposed right paracentral disc extrusion.  Mild resulting central canal stenosis with probable impression on the descending right S1 nerve root clinical correlation for right S1 nerve root radiculopathy advised    Incidental probable enlargement ascending aorta partially visualized measuring approximately 4.1 cm.    Please see above for additional details.      Electronically signed by: Eric Montoya DO  Date:    09/09/2023  Time:    11:38               Narrative:    EXAMINATION:  MRI CERVICAL SPINE DEMYELINATING W W/O CONTRAST; MRI THORACIC SPINE DEMYELINATING W W/O CONTRAST; MRI LUMBAR SPINE W WO CONTRAST    CLINICAL HISTORY:  h/o MS. weakness and pain to lower legs;; h/o MS, weakness and pain to legs;; Demyelinating disease;.    TECHNIQUE:  Technique: sagittal T1, T2 and STIR and  axial T1 and T2 imaging of the cervical, thoracic and lumbarspine without contrast. Additionally axial T1 and sagittal fat sat T1 post contrast imaging of the cervical, thoracic and lumbar spine.  Six ml of Gadavist contrast was infused intravenously.    .    COMPARISON:  Radiograph cervical and thoracolumbar spine 11/15/2021    FINDINGS:  Cervical spine:Cervical sagittal alignment slightly straightened with trace grade 1 retrolisthesis of C5 on C6.  There is degenerative disc disease with disc desiccation height loss and endplate degeneration all levels most pronounced at C6/C7 with moderate height loss.  Allowing for degenerative changes the cervical vertebral body heights and contours are relatively stable without evidence for acute fracture.  Craniocervical junction within normal limits.  Allowing for artifact from motion there is no definite cervical cord signal abnormality to suggest edema.  No abnormal intrathecal enhancement    C2/C3: No significant disc bulge, central canal or neural foraminal stenosis.    C3/C4: Posterior disc osteophyte with uncovertebral joint hypertrophy without significant central canal stenosis with mild neural foraminal narrowing.    C4/C5: Posterior disc osteophyte with uncovertebral joint hypertrophy and facet arthropathy without significant central canal stenosis mild neural foraminal stenosis.    C5/C6: Posterior disc osteophyte with uncovertebral joint hypertrophy and facet arthropathy mild moderate central canal stenosis with bilateral neural foraminal stenosis.    C6/C7: Posterior disc osteophyte with uncovertebral joint hypertrophy and facet arthropathy mild central canal and bilateral neural foraminal stenosis.    C7/T1: No significant disc bulge, central canal or neural foraminal stenosis.    Thoracic spine:The thoracic sagittal alignment is within normal limits. The thoracic vertebral body heights, and contours are within normal limits without evidence for acute fracture  or subluxation.  There is mild moderate endplate degenerative change T6/T7 level.    Thoracic spinal cord normal in signal and contour allowing for motion artifacts without cord signal abnormality to suggest edema.  Tip of the conus approximates the inferior L1 level.    No abnormal intrathecal enhancement.    No significant disc bulge, central canal or neural foraminal stenosis throughout the thoracic canal.    Incidental enlargement of the visualized ascending aorta measuring approximately 4.1 cm image 23 series 19.    Lumbar spine: Lumbar sagittal alignment within normal limits.  The lumbar vertebral body heights, contours and bone marrow signal is within normal limits and without evidence for acute fracture or subluxation.    The distal spinal cord and conus is normal in signal and contour the tip of the conus approximates the inferior [id number].    T12/L1 and L1/L2 levels: No significant disc bulge, central canal or neural foraminal stenosis.    L2/L3: Small bulging disc without significant central canal or neural foraminal stenosis    L3/L4: No significant disc bulge, central canal or neural foraminal stenosis.    L4/L5: Posterior disc osteophyte with ligamentum flavum hypertrophy and facet arthropathy without significant central canal stenosis with mild bilateral neural foraminal stenosis right greater than left.    L5/S1: Posterior disc osteophyte with superimposed right paracentral disc extrusion with disc material extending approximately 7 mm craniocaudal in the ventral epidural space.  Mass effect on the central canal with mild central canal stenosis with probable impression on the descending right S1 nerve root underlying right S1 nerve root radiculopathy to be considered.    Thin margin of probable reactive enhancement along the margin of the disc extrusion.    This report was flagged in [employer] as abnormal.                        Final result by Eric Montoya DO (09/09/23 11:38:15)                    Impression:      Allowing for artifact from motion no definite edema signal abnormality within the spinal cord to suggest sequela of demyelination or active myelitis.  There is no abnormal cord enhancement    There are degenerative changes as detailed above with L5/S1 posterior disc osteophyte and superimposed right paracentral disc extrusion.  Mild resulting central canal stenosis with probable impression on the descending right S1 nerve root clinical correlation for right S1 nerve root radiculopathy advised    Incidental probable enlargement ascending aorta partially visualized measuring approximately 4.1 cm.    Please see above for additional details.      Electronically signed by: Eric Montoya DO  Date:    09/09/2023  Time:    11:38               Narrative:    EXAMINATION:  MRI CERVICAL SPINE DEMYELINATING W W/O CONTRAST; MRI THORACIC SPINE DEMYELINATING W W/O CONTRAST; MRI LUMBAR SPINE W WO CONTRAST    CLINICAL HISTORY:  h/o MS. weakness and pain to lower legs;; h/o MS, weakness and pain to legs;; Demyelinating disease;.    TECHNIQUE:  Technique: sagittal T1, T2 and STIR and axial T1 and T2 imaging of the cervical, thoracic and lumbarspine without contrast. Additionally axial T1 and sagittal fat sat T1 post contrast imaging of the cervical, thoracic and lumbar spine.  Six ml of Gadavist contrast was infused intravenously.    .    COMPARISON:  Radiograph cervical and thoracolumbar spine 11/15/2021    FINDINGS:  Cervical spine:Cervical sagittal alignment slightly straightened with trace grade 1 retrolisthesis of C5 on C6.  There is degenerative disc disease with disc desiccation height loss and endplate degeneration all levels most pronounced at C6/C7 with moderate height loss.  Allowing for degenerative changes the cervical vertebral body heights and contours are relatively stable without evidence for acute fracture.  Craniocervical junction within normal limits.  Allowing for artifact from motion there is no  definite cervical cord signal abnormality to suggest edema.  No abnormal intrathecal enhancement    C2/C3: No significant disc bulge, central canal or neural foraminal stenosis.    C3/C4: Posterior disc osteophyte with uncovertebral joint hypertrophy without significant central canal stenosis with mild neural foraminal narrowing.    C4/C5: Posterior disc osteophyte with uncovertebral joint hypertrophy and facet arthropathy without significant central canal stenosis mild neural foraminal stenosis.    C5/C6: Posterior disc osteophyte with uncovertebral joint hypertrophy and facet arthropathy mild moderate central canal stenosis with bilateral neural foraminal stenosis.    C6/C7: Posterior disc osteophyte with uncovertebral joint hypertrophy and facet arthropathy mild central canal and bilateral neural foraminal stenosis.    C7/T1: No significant disc bulge, central canal or neural foraminal stenosis.    Thoracic spine:The thoracic sagittal alignment is within normal limits. The thoracic vertebral body heights, and contours are within normal limits without evidence for acute fracture or subluxation.  There is mild moderate endplate degenerative change T6/T7 level.    Thoracic spinal cord normal in signal and contour allowing for motion artifacts without cord signal abnormality to suggest edema.  Tip of the conus approximates the inferior L1 level.    No abnormal intrathecal enhancement.    No significant disc bulge, central canal or neural foraminal stenosis throughout the thoracic canal.    Incidental enlargement of the visualized ascending aorta measuring approximately 4.1 cm image 23 series 19.    Lumbar spine: Lumbar sagittal alignment within normal limits.  The lumbar vertebral body heights, contours and bone marrow signal is within normal limits and without evidence for acute fracture or subluxation.    The distal spinal cord and conus is normal in signal and contour the tip of the conus approximates the inferior  [id number].    T12/L1 and L1/L2 levels: No significant disc bulge, central canal or neural foraminal stenosis.    L2/L3: Small bulging disc without significant central canal or neural foraminal stenosis    L3/L4: No significant disc bulge, central canal or neural foraminal stenosis.    L4/L5: Posterior disc osteophyte with ligamentum flavum hypertrophy and facet arthropathy without significant central canal stenosis with mild bilateral neural foraminal stenosis right greater than left.    L5/S1: Posterior disc osteophyte with superimposed right paracentral disc extrusion with disc material extending approximately 7 mm craniocaudal in the ventral epidural space.  Mass effect on the central canal with mild central canal stenosis with probable impression on the descending right S1 nerve root underlying right S1 nerve root radiculopathy to be considered.    Thin margin of probable reactive enhancement along the margin of the disc extrusion.    This report was flagged in Epic as abnormal.                        Final result by Eric Montoya DO (09/09/23 11:38:15)                   Impression:      Allowing for artifact from motion no definite edema signal abnormality within the spinal cord to suggest sequela of demyelination or active myelitis.  There is no abnormal cord enhancement    There are degenerative changes as detailed above with L5/S1 posterior disc osteophyte and superimposed right paracentral disc extrusion.  Mild resulting central canal stenosis with probable impression on the descending right S1 nerve root clinical correlation for right S1 nerve root radiculopathy advised    Incidental probable enlargement ascending aorta partially visualized measuring approximately 4.1 cm.    Please see above for additional details.      Electronically signed by: Eric Montoya DO  Date:    09/09/2023  Time:    11:38               Narrative:    EXAMINATION:  MRI CERVICAL SPINE DEMYELINATING W W/O CONTRAST; MRI THORACIC  SPINE DEMYELINATING W W/O CONTRAST; MRI LUMBAR SPINE W WO CONTRAST    CLINICAL HISTORY:  h/o MS. weakness and pain to lower legs;; h/o MS, weakness and pain to legs;; Demyelinating disease;.    TECHNIQUE:  Technique: sagittal T1, T2 and STIR and axial T1 and T2 imaging of the cervical, thoracic and lumbarspine without contrast. Additionally axial T1 and sagittal fat sat T1 post contrast imaging of the cervical, thoracic and lumbar spine.  Six ml of Gadavist contrast was infused intravenously.    .    COMPARISON:  Radiograph cervical and thoracolumbar spine 11/15/2021    FINDINGS:  Cervical spine:Cervical sagittal alignment slightly straightened with trace grade 1 retrolisthesis of C5 on C6.  There is degenerative disc disease with disc desiccation height loss and endplate degeneration all levels most pronounced at C6/C7 with moderate height loss.  Allowing for degenerative changes the cervical vertebral body heights and contours are relatively stable without evidence for acute fracture.  Craniocervical junction within normal limits.  Allowing for artifact from motion there is no definite cervical cord signal abnormality to suggest edema.  No abnormal intrathecal enhancement    C2/C3: No significant disc bulge, central canal or neural foraminal stenosis.    C3/C4: Posterior disc osteophyte with uncovertebral joint hypertrophy without significant central canal stenosis with mild neural foraminal narrowing.    C4/C5: Posterior disc osteophyte with uncovertebral joint hypertrophy and facet arthropathy without significant central canal stenosis mild neural foraminal stenosis.    C5/C6: Posterior disc osteophyte with uncovertebral joint hypertrophy and facet arthropathy mild moderate central canal stenosis with bilateral neural foraminal stenosis.    C6/C7: Posterior disc osteophyte with uncovertebral joint hypertrophy and facet arthropathy mild central canal and bilateral neural foraminal stenosis.    C7/T1: No  significant disc bulge, central canal or neural foraminal stenosis.    Thoracic spine:The thoracic sagittal alignment is within normal limits. The thoracic vertebral body heights, and contours are within normal limits without evidence for acute fracture or subluxation.  There is mild moderate endplate degenerative change T6/T7 level.    Thoracic spinal cord normal in signal and contour allowing for motion artifacts without cord signal abnormality to suggest edema.  Tip of the conus approximates the inferior L1 level.    No abnormal intrathecal enhancement.    No significant disc bulge, central canal or neural foraminal stenosis throughout the thoracic canal.    Incidental enlargement of the visualized ascending aorta measuring approximately 4.1 cm image 23 series 19.    Lumbar spine: Lumbar sagittal alignment within normal limits.  The lumbar vertebral body heights, contours and bone marrow signal is within normal limits and without evidence for acute fracture or subluxation.    The distal spinal cord and conus is normal in signal and contour the tip of the conus approximates the inferior [id number].    T12/L1 and L1/L2 levels: No significant disc bulge, central canal or neural foraminal stenosis.    L2/L3: Small bulging disc without significant central canal or neural foraminal stenosis    L3/L4: No significant disc bulge, central canal or neural foraminal stenosis.    L4/L5: Posterior disc osteophyte with ligamentum flavum hypertrophy and facet arthropathy without significant central canal stenosis with mild bilateral neural foraminal stenosis right greater than left.    L5/S1: Posterior disc osteophyte with superimposed right paracentral disc extrusion with disc material extending approximately 7 mm craniocaudal in the ventral epidural space.  Mass effect on the central canal with mild central canal stenosis with probable impression on the descending right S1 nerve root underlying right S1 nerve root radiculopathy  to be considered.    Thin margin of probable reactive enhancement along the margin of the disc extrusion.    This report was flagged in Epic as abnormal.                                        MRI Thoracic Spine Demyelinating W W/O Contrast (Final result)  Result time 09/09/23 11:38:15      Final result by Eric Montoya DO (09/09/23 11:38:15)                   Impression:      Allowing for artifact from motion no definite edema signal abnormality within the spinal cord to suggest sequela of demyelination or active myelitis.  There is no abnormal cord enhancement    There are degenerative changes as detailed above with L5/S1 posterior disc osteophyte and superimposed right paracentral disc extrusion.  Mild resulting central canal stenosis with probable impression on the descending right S1 nerve root clinical correlation for right S1 nerve root radiculopathy advised    Incidental probable enlargement ascending aorta partially visualized measuring approximately 4.1 cm.    Please see above for additional details.      Electronically signed by: Eric Montoya DO  Date:    09/09/2023  Time:    11:38               Narrative:    EXAMINATION:  MRI CERVICAL SPINE DEMYELINATING W W/O CONTRAST; MRI THORACIC SPINE DEMYELINATING W W/O CONTRAST; MRI LUMBAR SPINE W WO CONTRAST    CLINICAL HISTORY:  h/o MS. weakness and pain to lower legs;; h/o MS, weakness and pain to legs;; Demyelinating disease;.    TECHNIQUE:  Technique: sagittal T1, T2 and STIR and axial T1 and T2 imaging of the cervical, thoracic and lumbarspine without contrast. Additionally axial T1 and sagittal fat sat T1 post contrast imaging of the cervical, thoracic and lumbar spine.  Six ml of Gadavist contrast was infused intravenously.    .    COMPARISON:  Radiograph cervical and thoracolumbar spine 11/15/2021    FINDINGS:  Cervical spine:Cervical sagittal alignment slightly straightened with trace grade 1 retrolisthesis of C5 on C6.  There is degenerative disc  disease with disc desiccation height loss and endplate degeneration all levels most pronounced at C6/C7 with moderate height loss.  Allowing for degenerative changes the cervical vertebral body heights and contours are relatively stable without evidence for acute fracture.  Craniocervical junction within normal limits.  Allowing for artifact from motion there is no definite cervical cord signal abnormality to suggest edema.  No abnormal intrathecal enhancement    C2/C3: No significant disc bulge, central canal or neural foraminal stenosis.    C3/C4: Posterior disc osteophyte with uncovertebral joint hypertrophy without significant central canal stenosis with mild neural foraminal narrowing.    C4/C5: Posterior disc osteophyte with uncovertebral joint hypertrophy and facet arthropathy without significant central canal stenosis mild neural foraminal stenosis.    C5/C6: Posterior disc osteophyte with uncovertebral joint hypertrophy and facet arthropathy mild moderate central canal stenosis with bilateral neural foraminal stenosis.    C6/C7: Posterior disc osteophyte with uncovertebral joint hypertrophy and facet arthropathy mild central canal and bilateral neural foraminal stenosis.    C7/T1: No significant disc bulge, central canal or neural foraminal stenosis.    Thoracic spine:The thoracic sagittal alignment is within normal limits. The thoracic vertebral body heights, and contours are within normal limits without evidence for acute fracture or subluxation.  There is mild moderate endplate degenerative change T6/T7 level.    Thoracic spinal cord normal in signal and contour allowing for motion artifacts without cord signal abnormality to suggest edema.  Tip of the conus approximates the inferior L1 level.    No abnormal intrathecal enhancement.    No significant disc bulge, central canal or neural foraminal stenosis throughout the thoracic canal.    Incidental enlargement of the visualized ascending aorta measuring  approximately 4.1 cm image 23 series 19.    Lumbar spine: Lumbar sagittal alignment within normal limits.  The lumbar vertebral body heights, contours and bone marrow signal is within normal limits and without evidence for acute fracture or subluxation.    The distal spinal cord and conus is normal in signal and contour the tip of the conus approximates the inferior [id number].    T12/L1 and L1/L2 levels: No significant disc bulge, central canal or neural foraminal stenosis.    L2/L3: Small bulging disc without significant central canal or neural foraminal stenosis    L3/L4: No significant disc bulge, central canal or neural foraminal stenosis.    L4/L5: Posterior disc osteophyte with ligamentum flavum hypertrophy and facet arthropathy without significant central canal stenosis with mild bilateral neural foraminal stenosis right greater than left.    L5/S1: Posterior disc osteophyte with superimposed right paracentral disc extrusion with disc material extending approximately 7 mm craniocaudal in the ventral epidural space.  Mass effect on the central canal with mild central canal stenosis with probable impression on the descending right S1 nerve root underlying right S1 nerve root radiculopathy to be considered.    Thin margin of probable reactive enhancement along the margin of the disc extrusion.    This report was flagged in Epic as abnormal.                        Final result by Eric Montoya DO (09/09/23 11:38:15)                   Impression:      Allowing for artifact from motion no definite edema signal abnormality within the spinal cord to suggest sequela of demyelination or active myelitis.  There is no abnormal cord enhancement    There are degenerative changes as detailed above with L5/S1 posterior disc osteophyte and superimposed right paracentral disc extrusion.  Mild resulting central canal stenosis with probable impression on the descending right S1 nerve root clinical correlation for right S1  nerve root radiculopathy advised    Incidental probable enlargement ascending aorta partially visualized measuring approximately 4.1 cm.    Please see above for additional details.      Electronically signed by: Eric Montoya DO  Date:    09/09/2023  Time:    11:38               Narrative:    EXAMINATION:  MRI CERVICAL SPINE DEMYELINATING W W/O CONTRAST; MRI THORACIC SPINE DEMYELINATING W W/O CONTRAST; MRI LUMBAR SPINE W WO CONTRAST    CLINICAL HISTORY:  h/o MS. weakness and pain to lower legs;; h/o MS, weakness and pain to legs;; Demyelinating disease;.    TECHNIQUE:  Technique: sagittal T1, T2 and STIR and axial T1 and T2 imaging of the cervical, thoracic and lumbarspine without contrast. Additionally axial T1 and sagittal fat sat T1 post contrast imaging of the cervical, thoracic and lumbar spine.  Six ml of Gadavist contrast was infused intravenously.    .    COMPARISON:  Radiograph cervical and thoracolumbar spine 11/15/2021    FINDINGS:  Cervical spine:Cervical sagittal alignment slightly straightened with trace grade 1 retrolisthesis of C5 on C6.  There is degenerative disc disease with disc desiccation height loss and endplate degeneration all levels most pronounced at C6/C7 with moderate height loss.  Allowing for degenerative changes the cervical vertebral body heights and contours are relatively stable without evidence for acute fracture.  Craniocervical junction within normal limits.  Allowing for artifact from motion there is no definite cervical cord signal abnormality to suggest edema.  No abnormal intrathecal enhancement    C2/C3: No significant disc bulge, central canal or neural foraminal stenosis.    C3/C4: Posterior disc osteophyte with uncovertebral joint hypertrophy without significant central canal stenosis with mild neural foraminal narrowing.    C4/C5: Posterior disc osteophyte with uncovertebral joint hypertrophy and facet arthropathy without significant central canal stenosis mild neural  foraminal stenosis.    C5/C6: Posterior disc osteophyte with uncovertebral joint hypertrophy and facet arthropathy mild moderate central canal stenosis with bilateral neural foraminal stenosis.    C6/C7: Posterior disc osteophyte with uncovertebral joint hypertrophy and facet arthropathy mild central canal and bilateral neural foraminal stenosis.    C7/T1: No significant disc bulge, central canal or neural foraminal stenosis.    Thoracic spine:The thoracic sagittal alignment is within normal limits. The thoracic vertebral body heights, and contours are within normal limits without evidence for acute fracture or subluxation.  There is mild moderate endplate degenerative change T6/T7 level.    Thoracic spinal cord normal in signal and contour allowing for motion artifacts without cord signal abnormality to suggest edema.  Tip of the conus approximates the inferior L1 level.    No abnormal intrathecal enhancement.    No significant disc bulge, central canal or neural foraminal stenosis throughout the thoracic canal.    Incidental enlargement of the visualized ascending aorta measuring approximately 4.1 cm image 23 series 19.    Lumbar spine: Lumbar sagittal alignment within normal limits.  The lumbar vertebral body heights, contours and bone marrow signal is within normal limits and without evidence for acute fracture or subluxation.    The distal spinal cord and conus is normal in signal and contour the tip of the conus approximates the inferior [id number].    T12/L1 and L1/L2 levels: No significant disc bulge, central canal or neural foraminal stenosis.    L2/L3: Small bulging disc without significant central canal or neural foraminal stenosis    L3/L4: No significant disc bulge, central canal or neural foraminal stenosis.    L4/L5: Posterior disc osteophyte with ligamentum flavum hypertrophy and facet arthropathy without significant central canal stenosis with mild bilateral neural foraminal stenosis right greater than  left.    L5/S1: Posterior disc osteophyte with superimposed right paracentral disc extrusion with disc material extending approximately 7 mm craniocaudal in the ventral epidural space.  Mass effect on the central canal with mild central canal stenosis with probable impression on the descending right S1 nerve root underlying right S1 nerve root radiculopathy to be considered.    Thin margin of probable reactive enhancement along the margin of the disc extrusion.    This report was flagged in Epic as abnormal.                                        MRI Cervical Spine Demyelinating W W/O Contrast (Final result)  Result time 09/09/23 11:38:15      Final result by Eric Montoya DO (09/09/23 11:38:15)                   Impression:      Allowing for artifact from motion no definite edema signal abnormality within the spinal cord to suggest sequela of demyelination or active myelitis.  There is no abnormal cord enhancement    There are degenerative changes as detailed above with L5/S1 posterior disc osteophyte and superimposed right paracentral disc extrusion.  Mild resulting central canal stenosis with probable impression on the descending right S1 nerve root clinical correlation for right S1 nerve root radiculopathy advised    Incidental probable enlargement ascending aorta partially visualized measuring approximately 4.1 cm.    Please see above for additional details.      Electronically signed by: Eric Montoya DO  Date:    09/09/2023  Time:    11:38               Narrative:    EXAMINATION:  MRI CERVICAL SPINE DEMYELINATING W W/O CONTRAST; MRI THORACIC SPINE DEMYELINATING W W/O CONTRAST; MRI LUMBAR SPINE W WO CONTRAST    CLINICAL HISTORY:  h/o MS. weakness and pain to lower legs;; h/o MS, weakness and pain to legs;; Demyelinating disease;.    TECHNIQUE:  Technique: sagittal T1, T2 and STIR and axial T1 and T2 imaging of the cervical, thoracic and lumbarspine without contrast. Additionally axial T1 and sagittal fat  sat T1 post contrast imaging of the cervical, thoracic and lumbar spine.  Six ml of Gadavist contrast was infused intravenously.    .    COMPARISON:  Radiograph cervical and thoracolumbar spine 11/15/2021    FINDINGS:  Cervical spine:Cervical sagittal alignment slightly straightened with trace grade 1 retrolisthesis of C5 on C6.  There is degenerative disc disease with disc desiccation height loss and endplate degeneration all levels most pronounced at C6/C7 with moderate height loss.  Allowing for degenerative changes the cervical vertebral body heights and contours are relatively stable without evidence for acute fracture.  Craniocervical junction within normal limits.  Allowing for artifact from motion there is no definite cervical cord signal abnormality to suggest edema.  No abnormal intrathecal enhancement    C2/C3: No significant disc bulge, central canal or neural foraminal stenosis.    C3/C4: Posterior disc osteophyte with uncovertebral joint hypertrophy without significant central canal stenosis with mild neural foraminal narrowing.    C4/C5: Posterior disc osteophyte with uncovertebral joint hypertrophy and facet arthropathy without significant central canal stenosis mild neural foraminal stenosis.    C5/C6: Posterior disc osteophyte with uncovertebral joint hypertrophy and facet arthropathy mild moderate central canal stenosis with bilateral neural foraminal stenosis.    C6/C7: Posterior disc osteophyte with uncovertebral joint hypertrophy and facet arthropathy mild central canal and bilateral neural foraminal stenosis.    C7/T1: No significant disc bulge, central canal or neural foraminal stenosis.    Thoracic spine:The thoracic sagittal alignment is within normal limits. The thoracic vertebral body heights, and contours are within normal limits without evidence for acute fracture or subluxation.  There is mild moderate endplate degenerative change T6/T7 level.    Thoracic spinal cord normal in signal  and contour allowing for motion artifacts without cord signal abnormality to suggest edema.  Tip of the conus approximates the inferior L1 level.    No abnormal intrathecal enhancement.    No significant disc bulge, central canal or neural foraminal stenosis throughout the thoracic canal.    Incidental enlargement of the visualized ascending aorta measuring approximately 4.1 cm image 23 series 19.    Lumbar spine: Lumbar sagittal alignment within normal limits.  The lumbar vertebral body heights, contours and bone marrow signal is within normal limits and without evidence for acute fracture or subluxation.    The distal spinal cord and conus is normal in signal and contour the tip of the conus approximates the inferior [id number].    T12/L1 and L1/L2 levels: No significant disc bulge, central canal or neural foraminal stenosis.    L2/L3: Small bulging disc without significant central canal or neural foraminal stenosis    L3/L4: No significant disc bulge, central canal or neural foraminal stenosis.    L4/L5: Posterior disc osteophyte with ligamentum flavum hypertrophy and facet arthropathy without significant central canal stenosis with mild bilateral neural foraminal stenosis right greater than left.    L5/S1: Posterior disc osteophyte with superimposed right paracentral disc extrusion with disc material extending approximately 7 mm craniocaudal in the ventral epidural space.  Mass effect on the central canal with mild central canal stenosis with probable impression on the descending right S1 nerve root underlying right S1 nerve root radiculopathy to be considered.    Thin margin of probable reactive enhancement along the margin of the disc extrusion.    This report was flagged in Epic as abnormal.                        Final result by Eric Montoya DO (09/09/23 11:38:15)                   Impression:      Allowing for artifact from motion no definite edema signal abnormality within the spinal cord to suggest  sequela of demyelination or active myelitis.  There is no abnormal cord enhancement    There are degenerative changes as detailed above with L5/S1 posterior disc osteophyte and superimposed right paracentral disc extrusion.  Mild resulting central canal stenosis with probable impression on the descending right S1 nerve root clinical correlation for right S1 nerve root radiculopathy advised    Incidental probable enlargement ascending aorta partially visualized measuring approximately 4.1 cm.    Please see above for additional details.      Electronically signed by: Eric Montoya DO  Date:    09/09/2023  Time:    11:38               Narrative:    EXAMINATION:  MRI CERVICAL SPINE DEMYELINATING W W/O CONTRAST; MRI THORACIC SPINE DEMYELINATING W W/O CONTRAST; MRI LUMBAR SPINE W WO CONTRAST    CLINICAL HISTORY:  h/o MS. weakness and pain to lower legs;; h/o MS, weakness and pain to legs;; Demyelinating disease;.    TECHNIQUE:  Technique: sagittal T1, T2 and STIR and axial T1 and T2 imaging of the cervical, thoracic and lumbarspine without contrast. Additionally axial T1 and sagittal fat sat T1 post contrast imaging of the cervical, thoracic and lumbar spine.  Six ml of Gadavist contrast was infused intravenously.    .    COMPARISON:  Radiograph cervical and thoracolumbar spine 11/15/2021    FINDINGS:  Cervical spine:Cervical sagittal alignment slightly straightened with trace grade 1 retrolisthesis of C5 on C6.  There is degenerative disc disease with disc desiccation height loss and endplate degeneration all levels most pronounced at C6/C7 with moderate height loss.  Allowing for degenerative changes the cervical vertebral body heights and contours are relatively stable without evidence for acute fracture.  Craniocervical junction within normal limits.  Allowing for artifact from motion there is no definite cervical cord signal abnormality to suggest edema.  No abnormal intrathecal enhancement    C2/C3: No significant  disc bulge, central canal or neural foraminal stenosis.    C3/C4: Posterior disc osteophyte with uncovertebral joint hypertrophy without significant central canal stenosis with mild neural foraminal narrowing.    C4/C5: Posterior disc osteophyte with uncovertebral joint hypertrophy and facet arthropathy without significant central canal stenosis mild neural foraminal stenosis.    C5/C6: Posterior disc osteophyte with uncovertebral joint hypertrophy and facet arthropathy mild moderate central canal stenosis with bilateral neural foraminal stenosis.    C6/C7: Posterior disc osteophyte with uncovertebral joint hypertrophy and facet arthropathy mild central canal and bilateral neural foraminal stenosis.    C7/T1: No significant disc bulge, central canal or neural foraminal stenosis.    Thoracic spine:The thoracic sagittal alignment is within normal limits. The thoracic vertebral body heights, and contours are within normal limits without evidence for acute fracture or subluxation.  There is mild moderate endplate degenerative change T6/T7 level.    Thoracic spinal cord normal in signal and contour allowing for motion artifacts without cord signal abnormality to suggest edema.  Tip of the conus approximates the inferior L1 level.    No abnormal intrathecal enhancement.    No significant disc bulge, central canal or neural foraminal stenosis throughout the thoracic canal.    Incidental enlargement of the visualized ascending aorta measuring approximately 4.1 cm image 23 series 19.    Lumbar spine: Lumbar sagittal alignment within normal limits.  The lumbar vertebral body heights, contours and bone marrow signal is within normal limits and without evidence for acute fracture or subluxation.    The distal spinal cord and conus is normal in signal and contour the tip of the conus approximates the inferior [id number].    T12/L1 and L1/L2 levels: No significant disc bulge, central canal or neural foraminal stenosis.    L2/L3:  Small bulging disc without significant central canal or neural foraminal stenosis    L3/L4: No significant disc bulge, central canal or neural foraminal stenosis.    L4/L5: Posterior disc osteophyte with ligamentum flavum hypertrophy and facet arthropathy without significant central canal stenosis with mild bilateral neural foraminal stenosis right greater than left.    L5/S1: Posterior disc osteophyte with superimposed right paracentral disc extrusion with disc material extending approximately 7 mm craniocaudal in the ventral epidural space.  Mass effect on the central canal with mild central canal stenosis with probable impression on the descending right S1 nerve root underlying right S1 nerve root radiculopathy to be considered.    Thin margin of probable reactive enhancement along the margin of the disc extrusion.    This report was flagged in Epic as abnormal.                                       Medications   morphine injection 2 mg (2 mg Intravenous Given 9/9/23 0832)   ondansetron injection 4 mg (4 mg Intravenous Given 9/9/23 0904)   gadobutroL (GADAVIST) injection 6 mL (6 mLs Intravenous Given 9/9/23 1045)   morphine injection 2 mg (2 mg Intravenous Given 9/9/23 1256)   ondansetron injection 4 mg (4 mg Intravenous Given 9/9/23 1256)   ondansetron injection 4 mg (4 mg Intravenous Given 9/9/23 1702)     Medical Decision Making  Amount and/or Complexity of Data Reviewed  Labs: ordered. Decision-making details documented in ED Course.  Radiology: ordered.  Discussion of management or test interpretation with external provider(s): Neurosurgery and neurology     Risk  Prescription drug management.         APC / Resident Notes:   57-year-old female with history of GERD, MS presents to the ED complaining of bilateral leg pain.  Vital signs stable.  No acute distress.  Regular rate and rhythm.  Lungs are clear.  Abdomen is soft, nontender.  No midline C, T, or L-spine tenderness.  3/5 strength to the bilateral  lower extremities.  No edema.  Bilateral pedal pulses 2+.  Differential diagnosis includes but is not limited to MS flare, worsening MS, dehydration, acute kidney injury, UTI, electrolyte abnormality, rhabdo.  Will get labs, MRI brain/C/T/L-spine.    No leukocytosis or anemia.  CMP unremarkable.  CPK normal.  ESR and CRP normal.  UA with 13 wbc's, urine culture pending.    MRI brain without acute infarction or enhancing lesion to suggest active demyelination.   MRI C/T/L spine no edema signal abnormality to suggest demyelination or active myelitis. Degenerative changes with paracentral disc extrusion resulting in mild central canal stenosis. Incidental enlargement of ascending aorta.     Discussed with neurosurgery and they evaluated in the ED. See consult note for details. No acute surgical intervention. Recommend observation for pain control PT/OT.     Xray pelvis/hip and sacrum/coccyx with no fracture.    4:12 PM  Signed out to Danyelle Cruz PA-C pending neurology evaluation and recs. Discussed observation with patient for pain control PT/PT but she prefers to wait for neurology eval before making a decision.        Attending Attestation:     Physician Attestation Statement for NP/PA:   I have directed and reviewed the workup performed by the PA/NP.  I performed the substantive portion of the medical decision making.     Other NP/PA Attestation Additions:      Medical Decision Makin yo female presenting with lower extremity weakness.  H/o MS.  Reports falling, denies head injury.     Agree with YUMIKO exam above.  No e/o head injury, no C/T/L spinal TTP.  No chest wall, abd TTP, no pelvic bony TTP or instability. LE weakness.   Intact distal pulses.    MRIs reviewed with YUMIKO, appreciate neurosurgery and neurology recommendations.  Signed out to oncoming attending at 2p. Anticipate admission for PT given recent falls/fall risk, pain control.              ED Course as of 23 1813   Sat Sep 09, 2023   2050  WBC: 5.61  No leukocytosis  [AB]   0926 Creatinine: 0.8  Normal  [AB]   1045 Sed Rate: 2 [AB]   1045 Normal inflammatory markers  [AB]      ED Course User Index  [AB] Francisco Kirkpatrick MD                    Clinical Impression:   Final diagnoses:  [I77.810] Ascending aorta dilation (Primary)  [W19.XXXA] Fall  [R29.898] Weakness of lower extremity, unspecified laterality               Genevieve Cancino PA-C  09/09/23 1612       Francisco Kirkpatrick MD  09/09/23 3215

## 2023-09-09 NOTE — ASSESSMENT & PLAN NOTE
Hailey Sullivan is a 57 year old female with history of RRMS (not being actively followed or treated by neurologist), mild aortic/mitral regurgitation, GERD, bladder prolapse, asthma, and hypothyroidism who presents with bilateral leg weakness and pain. She recently went to the ED 08/30 for anaphylaxis secondary to insect bite. Since leaving the ED she has had consistent pain and weakness in both of her legs. Exam consistent with 4/5 strength in the lower extremities with 2+ patellar reflexes (See HPI and Exam above for further detail).She is afebrile and remainder of vitals are within normal limits. UA with 13 wbc. MRI cervical with no active demyelination and L5/S1 posterior disc osteophyte. MRI brain with few scattered small foci T2 FLAIR signal intensity within the supratentorial white matter most prominently clustered in the left posterior temporoparietal subcortical white matter. No signs of enhancing lesions.     Impression:  Do not suspect acute MS flare at this time although the patient has associated back pain and nausea that should be addressed. In addition, the patient has not followed up with a neurologist for a number of years and recommend close outpatient follow up.     Recommendations:    - Give short course of zofran on discharge to help control nausea  - Provide short course muscle relaxer, I.e methocarbamol or tizanidine   - If patient has continued dysuria then treat for UTI  - Schedule close outpatient follow up with neurology, particularly MS specialist (Dr. Melchor, Dr. Wade, or Dr. Marquez)   - We will sign off; please reach out if further concerns with neurological exam

## 2023-09-09 NOTE — CONSULTS
"[facility] - Emergency Dept  Neurosurgery  Consult Note    Inpatient consult to Neurosurgery  Consult performed by: Kaitlynn Lozoya MD  Consult ordered by: Genevieve Cancino PA-C  Reason for consult: back pain        Subjective:     Chief Complaint/Reason for Admission: Back pain    History of Present Illness: 57 F with h/o relapsing/remitting MS presents to the ED complaining of bilateral leg pain.  She was seen in this ED 8/30 for anaphylaxis following an ant bite. Has known allergy with prior ICU admission for bites in past. States she was picking something up behind the grill without her glasses and must have been bit. Noted dizziness and itching in throat following ant bite.  Since this event/discharge, has had progressive pain and weakness to the bilateral legs, using cane to ambulate. Usually can ambulate independently.  Had first trip/fall in bathroom up against dresser with R side of body given weakness once returning home. Had another fall recently bumping down stairs on tailbone after tripped by her dog on stairs. No LOC. Pain has been progressively worsening, significantly worse last night, 10/10 "burning" to the bilateral upper legs. She is taken ibuprofen around the clock and applied a heating pad with no relief.  She reports nausea secondary to pain, improved with Zofran.  She does have some dysuria but has known bladder prolapse, working to have surgery scheduled.  She is not currently followed by Neurology, was discharged from the clinic because she did not want to take medications for her MS.    No ASA/AC.           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 Jensen     Past Surgical History:   Procedure Laterality Date    APPENDECTOMY      AUGMENTATION OF BREAST      BREAST SURGERY      CHOLECYSTECTOMY       Family History       Problem Relation (Age of Onset)    Breast cancer Maternal Aunt (30), Maternal Aunt (50)    Cancer Mother (35)    Deep vein thrombosis Mother    Diabetes Mother    Multiple myeloma Mother    Stroke Father          Tobacco Use    Smoking status: Never    Smokeless tobacco: Never   Substance and Sexual Activity    Alcohol use: Yes     Alcohol/week: 7.0 standard drinks of alcohol     Types: 7 Glasses of wine per week     Comment: champagne only/ occasional    Drug use: No    Sexual activity: Yes     Partners: Male     Review of Systems   Constitutional:  Negative for chills and fever.   HENT:  Negative for trouble swallowing and voice change.    Eyes:  Negative for photophobia and visual disturbance.   Respiratory:  Negative for chest tightness and shortness of breath.    Cardiovascular:  Negative for chest pain and leg swelling.   Gastrointestinal:  Negative for nausea and vomiting.   Genitourinary:  Positive for frequency.   Musculoskeletal:  Positive for arthralgias, back pain, gait problem and myalgias. Negative for neck pain.   Neurological:  Positive for weakness.     Objective:     Weight: 54.4 kg (120 lb)  Body mass index is 19.37 kg/m².  Vital Signs (Most Recent):  Temp: 98 °F (36.7 °C) (09/09/23 1224)  Pulse: 60 (09/09/23 1224)  Resp: 15 (09/09/23 1256)  BP: (!)  103/56 (09/09/23 1224)  SpO2: 98 % (09/09/23 1224) Vital Signs (24h Range):  Temp:  [98 °F (36.7 °C)-98.2 °F (36.8 °C)] 98 °F (36.7 °C)  Pulse:  [60-65] 60  Resp:  [15-20] 15  SpO2:  [98 %-100 %] 98 %  BP: (103-143)/(56-79) 103/56                                 Physical Exam         Neurosurgery Physical Exam  General: mild distress  Head: Non-traumatic, normocephalic  Eyes: Pupils equal, EOMi  Neck: Supple, normal ROM, no tenderness to palpation  CVS: Normal rate and rhythm, distal pulses present  Pulm: Symmetric expansion, no respiratory distress  GI: Abdomen nondistended, nontender  MSK: Moves all extremities without restriction, atraumatic  Skin: Dry, intact  Psych: Normal thought content and cognition; tearful  Neuro: AOx3, GCS [id number]  CNII-XII: Intact on fine exam, PERRL, EOMi, facial sensation preserved, no facial asymmetry, tongue/uvula/palate midline, shoulder shrug equal, No pronator drift  Extremities:  Motor:  Upper Extremity:                             Deltoids        Triceps        Biceps        WE        WF                Interosseous           R        5/5              5/5              5/5            5/5         5/5         5/5                5/5           L        5/5              5/5              5/5            5/5         5/5         5/5                5/5  Lower Extremity: pain limited BLE movement R>L, low back/hip pain w/movement                                      HF       KE        KF        DF        PF        EHL           R       4-/5      4/5        4+/5     4-/5        4+/5     4/5           L       5/5        4/5        4+/5       4+/5     4+/5      4/5    Reflexes:     DTR: 2+ and symmetrically throughout     Daniel's: Negative     Babinski's: Negative     Clonus: Negative    Sensory:      Sensorium intact throughout, no sensory level present    Coordination:      Coordination intact throughout       Cervical Spine:      ROM: Full with flexion, extension, lateral rotation and  ear-to-shoulder bend.      Midline TTP: Negative     Thoracic Spine:     Midline TTP: Negative     Lumbar Spine:     Midline TTP: Negative     Straight Leg Test: +R        Significant Labs:  Recent Labs   Lab 09/09/23 0820   GLU 80      K 4.4      CO2 26   BUN 13   CREATININE 0.8   CALCIUM 9.5     Recent Labs   Lab 09/09/23 0820   WBC 5.61   HGB 13.2   HCT 39.1        Recent Labs   Lab 09/09/23  1302   INR 1.0   APTT 26.5     Microbiology Results (last 7 days)       Procedure Component Value Units Date/Time    Urine culture [[id number]] Collected: 09/09/23 0838    Order Status: No result Specimen: Urine Updated: 09/09/23 0903          All pertinent labs from the last 24 hours have been reviewed.    Significant Diagnostics:  I have reviewed and interpreted all pertinent imaging results/findings within the past 24 hours.  MRI Cervical Spine Demyelinating W W/O Contrast    Result Date: 9/9/2023  Allowing for artifact from motion no definite edema signal abnormality within the spinal cord to suggest sequela of demyelination or active myelitis.  There is no abnormal cord enhancement There are degenerative changes as detailed above with L5/S1 posterior disc osteophyte and superimposed right paracentral disc extrusion.  Mild resulting central canal stenosis with probable impression on the descending right S1 nerve root clinical correlation for right S1 nerve root radiculopathy advised Incidental probable enlargement ascending aorta partially visualized measuring approximately 4.1 cm. Please see above for additional details. Electronically signed by: Eric Montoya DO Date:    09/09/2023 Time:    11:38    MRI Thoracic Spine Demyelinating W W/O Contrast    Result Date: 9/9/2023  Allowing for artifact from motion no definite edema signal abnormality within the spinal cord to suggest sequela of demyelination or active myelitis.  There is no abnormal cord enhancement There are degenerative changes as detailed  above with L5/S1 posterior disc osteophyte and superimposed right paracentral disc extrusion.  Mild resulting central canal stenosis with probable impression on the descending right S1 nerve root clinical correlation for right S1 nerve root radiculopathy advised Incidental probable enlargement ascending aorta partially visualized measuring approximately 4.1 cm. Please see above for additional details. Electronically signed by: Eric Montoya DO Date:    09/09/2023 Time:    11:38    MRI Lumbar Spine W WO Cont    Result Date: 9/9/2023  Allowing for artifact from motion no definite edema signal abnormality within the spinal cord to suggest sequela of demyelination or active myelitis.  There is no abnormal cord enhancement There are degenerative changes as detailed above with L5/S1 posterior disc osteophyte and superimposed right paracentral disc extrusion.  Mild resulting central canal stenosis with probable impression on the descending right S1 nerve root clinical correlation for right S1 nerve root radiculopathy advised Incidental probable enlargement ascending aorta partially visualized measuring approximately 4.1 cm. Please see above for additional details. Electronically signed by: Eric Montoya DO Date:    09/09/2023 Time:    11:38    MRI Brain Demyelinating W W/O Contrast    Result Date: 9/9/2023  Few scattered small sized foci of T2 FLAIR signal hyperintensity within the supratentorial white matter most prominent clustered in the left posterior temporoparietal subcortical white matter.  These are nonspecific and prior areas of demyelination to be considered in differential in light of history. No evidence for acute infarction or enhancing lesion to suggest active demyelination. Clinical correlation advised. Electronically signed by: Eric Montoya DO Date:    09/09/2023 Time:    11:35 .    Assessment/Plan:     MS (multiple sclerosis)  57F with h/o relapsing/remitting MS, recent anaphylactic reaction to ant bite,  BLE weakness with multiple falls presents with c/o progressive b/l hip pain, low back pain and gait difficulty with R L5/S1 disc herniation which does not completely explain all of patient's presenting symtpoms:    --Agree with observation for pain control, PT/OT. D/w ED      - q2h neurochecks in stepdown, q4h neurochecks on floor  --Hip/coccyx XR pending. MRI Brain, C/T/L reviewed as above  --PRN pain control  --Neurology consulted for recommendations regarding MS/MS flare off medications  --Follow-up full labs (CBC/CMP/PT-INR/PTT/T&S)  --PT/OT eval  --Continue to monitor clinically, notify NSGY immediately with any changes in neuro status    Dispo: ongoing    Will continue to follow while in patient. No acute neurosurgical intervention warranted at this time    D/w Dr. Noriega          Thank you for your consult. I will follow-up with patient. Please contact us if you have any additional questions.    Kaitlynn Arguello MD  Neurosurgery  [facility] - Emergency Dept

## 2023-09-09 NOTE — SUBJECTIVE & OBJECTIVE
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 Jensen       Past Surgical History:   Procedure Laterality Date    APPENDECTOMY      AUGMENTATION OF BREAST      BREAST SURGERY      CHOLECYSTECTOMY         Review of patient's allergies indicates:   Allergen Reactions    Allergen ext-venom-honey bee Swelling    Amoxicillin Nausea Only and Hives    Insect venom Anaphylaxis     Ant    Prednisone Anaphylaxis     psychosis  psychosis    Latex, natural rubber Swelling    Zithromax [azithromycin] Hives    Codeine Nausea And Vomiting    Zolpidem Nausea And Vomiting     HA  HA       Current Neurological Medications: see below    No current facility-administered medications on file prior to encounter.     Current Outpatient Medications on File Prior to Encounter   Medication Sig    ALPRAZolam (XANAX) 1 MG tablet TAKE 1/2 TO 1 TABLET BY MOUTH EVERY NIGHT AS NEEDED    cetirizine (ZYRTEC) 10 MG tablet Take 10 mg by mouth daily as needed.    cyanocobalamin (VITAMIN B-12) 1000 MCG tablet Place 1,000 mcg under the tongue once daily.    multivitamin-zinc gluconate (MULTIVITAMINS-FORTIFIED A-D-K) 5 mg/mL Drop Take by mouth once daily.      omeprazole (PRILOSEC) 10 MG capsule Take 10 mg by mouth once daily.    ondansetron (ZOFRAN-ODT) 8 MG TbDL Take 1/2 to 1 tablet every 8 hours as needed for nausea    albuterol (PROVENTIL/VENTOLIN HFA) 90 mcg/actuation inhaler Inhale 1-2 puffs into the lungs every 6 (six) hours as needed for Wheezing. Rescue    EPINEPHrine (EPIPEN 2-MAYKEL) 0.3 mg/0.3 mL AtIn Inject 0.3 mLs (0.3 mg total) into the muscle once. for 1  dose    fluticasone (FLONASE) 50 mcg/actuation nasal spray 2 sprays by Each Nare route once daily.    minocycline (MINOCIN,DYNACIN) 100 MG capsule Take 1 capsule (100 mg total) by mouth every 12 (twelve) hours. Take with food.     Family History       Problem Relation (Age of Onset)    Breast cancer Maternal Aunt (30), Maternal Aunt (50)    Cancer Mother (35)    Deep vein thrombosis Mother    Diabetes Mother    Multiple myeloma Mother    Stroke Father          Tobacco Use    Smoking status: Never    Smokeless tobacco: Never   Substance and Sexual Activity    Alcohol use: Yes     Alcohol/week: 7.0 standard drinks of alcohol     Types: 7 Glasses of wine per week     Comment: champagne only/ occasional    Drug use: No    Sexual activity: Yes     Partners: Male     Review of Systems   Constitutional:  Negative for chills and fever.   HENT:  Negative for rhinorrhea and trouble swallowing.    Eyes:  Negative for discharge.   Respiratory:  Negative for cough and shortness of breath.    Cardiovascular:  Negative for chest pain and leg swelling.   Gastrointestinal:  Positive for nausea. Negative for abdominal pain.   Genitourinary:  Negative for dysuria and hematuria.   Musculoskeletal:  Positive for arthralgias, back pain and myalgias.   Neurological:  Positive for weakness. Negative for tremors and headaches.   Psychiatric/Behavioral:  Negative for agitation and confusion.      Objective:     Vital Signs (Most Recent):  Temp: 98.2 °F (36.8 °C) (09/09/23 1637)  Pulse: 60 (09/09/23 1637)  Resp: 18 (09/09/23 1637)  BP: 112/74 (09/09/23 1637)  SpO2: 97 % (09/09/23 1637) Vital Signs (24h Range):  Temp:  [98 °F (36.7 °C)-98.4 °F (36.9 °C)] 98.2 °F (36.8 °C)  Pulse:  [60-65] 60  Resp:  [15-20] 18  SpO2:  [97 %-100 %] 97 %  BP: (103-143)/(56-79) 112/74     Weight: 54.4 kg (120 lb)  Body mass index is 19.37 kg/m².     Physical Exam  HENT:      Head: Normocephalic.   Eyes:      Pupils: Pupils are equal, round, and reactive to light.    Cardiovascular:      Rate and Rhythm: Normal rate.   Pulmonary:      Effort: Pulmonary effort is normal.   Musculoskeletal:      Cervical back: Normal range of motion.   Skin:     General: Skin is warm.   Neurological:      General: No focal deficit present.      Mental Status: She is alert and oriented to person, place, and time.      Cranial Nerves: Cranial nerves 2-12 are intact.      Coordination: Finger-Nose-Finger Test normal.      Deep Tendon Reflexes:      Reflex Scores:       Bicep reflexes are 2+ on the right side and 2+ on the left side.       Patellar reflexes are 2+ on the right side and 2+ on the left side.       Achilles reflexes are 2+ on the right side and 2+ on the left side.         NEUROLOGICAL EXAMINATION:     MENTAL STATUS   Oriented to person, place, and time.     CRANIAL NERVES   Cranial nerves II through XII intact.     CN III, IV, VI   Pupils are equal, round, and reactive to light.    MOTOR EXAM   Muscle bulk: normal  Overall muscle tone: normal    Strength   Right deltoid: 5/5  Left deltoid: 5/5  Right biceps: 5/5  Left biceps: 5/5  Right triceps: 5/5  Left triceps: 5/5  Right quadriceps: 4/5  Left quadriceps: 4/5  Right hamstrin/5  Left hamstrin/5  Right glutei: 4/5  Left glutei: 4/5  Right anterior tibial: 4/5  Left anterior tibial: 4/5    REFLEXES     Reflexes   Right biceps: 2+  Left biceps: 2+  Right patellar: 2+  Left patellar: 2+  Right achilles: 2+  Left achilles: 2+    SENSORY EXAM   Light touch normal.     GAIT AND COORDINATION      Coordination   Finger to nose coordination: normal      Significant Labs: All pertinent lab results from the past 24 hours have been reviewed.    Significant Imaging: I have reviewed all pertinent imaging results/findings within the past 24 hours.

## 2023-09-09 NOTE — HPI
"57 F with h/o relapsing/remitting MS presents to the ED complaining of bilateral leg pain.  She was seen in this ED 8/30 for anaphylaxis following an ant bite. Has known allergy with prior ICU admission for bites in past. States she was picking something up behind the grill without her glasses and must have been bit. Noted dizziness and itching in throat following ant bite.  Since this event/discharge, has had progressive pain and weakness to the bilateral legs, using cane to ambulate. Usually can ambulate independently.  Had first trip/fall in bathroom up against dresser with R side of body given weakness once returning home. Had another fall recently bumping down stairs on tailbone after tripped by her dog on stairs. No LOC. Pain has been progressively worsening, significantly worse last night, 10/10 "burning" to the bilateral upper legs. She is taken ibuprofen around the clock and applied a heating pad with no relief.  She reports nausea secondary to pain, improved with Zofran.  She does have some dysuria but has known bladder prolapse, working to have surgery scheduled.  She is not currently followed by Neurology, was discharged from the clinic because she did not want to take medications for her MS.    No ASA/AC.       "

## 2023-09-09 NOTE — CONSULTS
[facility] - Emergency Dept  Neurology  Consult Note    Patient Name: Hailey Sullivan  MRN: [id number]  Admission Date: 9/9/2023  Hospital Length of Stay: 0 days  Code Status: No Order   Attending Provider: Francisco Kirkpatrick MD   Consulting Provider: Rafal Cintron MD  Primary Care Physician: TAMANNA Guallpa MD  Principal Problem:<principal problem not specified>    Inpatient consult to Neurology  Consult performed by: Rafal Cintron MD  Consult ordered by: Genevieve Cancino PA-C         Subjective:     Chief Complaint:  Bilateral leg pain and weakness     HPI:   Hailey Sullivan is a 57 year old female with history of RRMS (not being actively followed or treated by neurologist), mild aortic/mitral regurgitation, GERD, bladder prolapse, asthma, and hypothyroidism who presents with bilateral leg weakness and pain. She recently went to the ED 08/30 for anaphylaxis secondary to insect bite. Since leaving the ED she has had consistent pain and weakness in both of her legs. She requires a cane at home for ambulation. She has had several falls in the interim, one in the bathroom and another walking down the stairs, after being tripped by dog; she fell on her buttocks, not her head. She describes the pain as burning 10/10. Morphine helped in the emergency department though she feels very nauseous requiring zofran. Neurology was consulted for bilateral lower extremity pain and weakness with history of MS.        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 Jensen       Past Surgical History:   Procedure Laterality  Date    APPENDECTOMY      AUGMENTATION OF BREAST      BREAST SURGERY      CHOLECYSTECTOMY         Review of patient's allergies indicates:   Allergen Reactions    Allergen ext-venom-honey bee Swelling    Amoxicillin Nausea Only and Hives    Insect venom Anaphylaxis     Ant    Prednisone Anaphylaxis     psychosis  psychosis    Latex, natural rubber Swelling    Zithromax [azithromycin] Hives    Codeine Nausea And Vomiting    Zolpidem Nausea And Vomiting     HA  HA       Current Neurological Medications: see below    No current facility-administered medications on file prior to encounter.     Current Outpatient Medications on File Prior to Encounter   Medication Sig    ALPRAZolam (XANAX) 1 MG tablet TAKE 1/2 TO 1 TABLET BY MOUTH EVERY NIGHT AS NEEDED    cetirizine (ZYRTEC) 10 MG tablet Take 10 mg by mouth daily as needed.    cyanocobalamin (VITAMIN B-12) 1000 MCG tablet Place 1,000 mcg under the tongue once daily.    multivitamin-zinc gluconate (MULTIVITAMINS-FORTIFIED A-D-K) 5 mg/mL Drop Take by mouth once daily.      omeprazole (PRILOSEC) 10 MG capsule Take 10 mg by mouth once daily.    ondansetron (ZOFRAN-ODT) 8 MG TbDL Take 1/2 to 1 tablet every 8 hours as needed for nausea    albuterol (PROVENTIL/VENTOLIN HFA) 90 mcg/actuation inhaler Inhale 1-2 puffs into the lungs every 6 (six) hours as needed for Wheezing. Rescue    EPINEPHrine (EPIPEN 2-MAYKEL) 0.3 mg/0.3 mL AtIn Inject 0.3 mLs (0.3 mg total) into the muscle once. for 1 dose    fluticasone (FLONASE) 50 mcg/actuation nasal spray 2 sprays by Each Nare route once daily.    minocycline (MINOCIN,DYNACIN) 100 MG capsule Take 1 capsule (100 mg total) by mouth every 12 (twelve) hours. Take with food.     Family History       Problem Relation (Age of Onset)    Breast cancer Maternal Aunt (30), Maternal Aunt (50)    Cancer Mother (35)    Deep vein thrombosis Mother    Diabetes Mother    Multiple myeloma Mother    Stroke Father          Tobacco Use     Smoking status: Never    Smokeless tobacco: Never   Substance and Sexual Activity    Alcohol use: Yes     Alcohol/week: 7.0 standard drinks of alcohol     Types: 7 Glasses of wine per week     Comment: champagne only/ occasional    Drug use: No    Sexual activity: Yes     Partners: Male     Review of Systems   Constitutional:  Negative for chills and fever.   HENT:  Negative for rhinorrhea and trouble swallowing.    Eyes:  Negative for discharge.   Respiratory:  Negative for cough and shortness of breath.    Cardiovascular:  Negative for chest pain and leg swelling.   Gastrointestinal:  Positive for nausea. Negative for abdominal pain.   Genitourinary:  Negative for dysuria and hematuria.   Musculoskeletal:  Positive for arthralgias, back pain and myalgias.   Neurological:  Positive for weakness. Negative for tremors and headaches.   Psychiatric/Behavioral:  Negative for agitation and confusion.      Objective:     Vital Signs (Most Recent):  Temp: 98.2 °F (36.8 °C) (09/09/23 1637)  Pulse: 60 (09/09/23 1637)  Resp: 18 (09/09/23 1637)  BP: 112/74 (09/09/23 1637)  SpO2: 97 % (09/09/23 1637) Vital Signs (24h Range):  Temp:  [98 °F (36.7 °C)-98.4 °F (36.9 °C)] 98.2 °F (36.8 °C)  Pulse:  [60-65] 60  Resp:  [15-20] 18  SpO2:  [97 %-100 %] 97 %  BP: (103-143)/(56-79) 112/74     Weight: 54.4 kg (120 lb)  Body mass index is 19.37 kg/m².     Physical Exam  HENT:      Head: Normocephalic.   Eyes:      Pupils: Pupils are equal, round, and reactive to light.   Cardiovascular:      Rate and Rhythm: Normal rate.   Pulmonary:      Effort: Pulmonary effort is normal.   Musculoskeletal:      Cervical back: Normal range of motion.   Skin:     General: Skin is warm.   Neurological:      General: No focal deficit present.      Mental Status: She is alert and oriented to person, place, and time.      Cranial Nerves: Cranial nerves 2-12 are intact.      Coordination: Finger-Nose-Finger Test normal.      Deep Tendon Reflexes:       Reflex Scores:       Bicep reflexes are 2+ on the right side and 2+ on the left side.       Patellar reflexes are 2+ on the right side and 2+ on the left side.       Achilles reflexes are 2+ on the right side and 2+ on the left side.         NEUROLOGICAL EXAMINATION:     MENTAL STATUS   Oriented to person, place, and time.     CRANIAL NERVES   Cranial nerves II through XII intact.     CN III, IV, VI   Pupils are equal, round, and reactive to light.    MOTOR EXAM   Muscle bulk: normal  Overall muscle tone: normal    Strength   Right deltoid: 5/5  Left deltoid: 5/5  Right biceps: 5/5  Left biceps: 5/5  Right triceps: 5/5  Left triceps: 5/5  Right quadriceps: 4/5  Left quadriceps: 4/5  Right hamstrin/5  Left hamstrin/5  Right glutei: 4/5  Left glutei: 4/5  Right anterior tibial: 4/5  Left anterior tibial: 4/5    REFLEXES     Reflexes   Right biceps: 2+  Left biceps: 2+  Right patellar: 2+  Left patellar: 2+  Right achilles: 2+  Left achilles: 2+    SENSORY EXAM   Light touch normal.     GAIT AND COORDINATION      Coordination   Finger to nose coordination: normal      Significant Labs: All pertinent lab results from the past 24 hours have been reviewed.    Significant Imaging: I have reviewed all pertinent imaging results/findings within the past 24 hours.    Assessment and Plan:     MS (multiple sclerosis)  Hailey Sullivan is a 57 year old female with history of RRMS (not being actively followed or treated by neurologist), mild aortic/mitral regurgitation, GERD, bladder prolapse, asthma, and hypothyroidism who presents with bilateral leg weakness and pain. She recently went to the ED  for anaphylaxis secondary to insect bite. Since leaving the ED she has had consistent pain and weakness in both of her legs. Exam consistent with 4/5 strength in the lower extremities with 2+ patellar reflexes (See HPI and Exam above for further detail).She is afebrile and remainder of vitals are within normal limits.  UA with 13 wbc. MRI cervical with no active demyelination and L5/S1 posterior disc osteophyte. MRI brain with few scattered small foci T2 FLAIR signal intensity within the supratentorial white matter most prominently clustered in the left posterior temporoparietal subcortical white matter. No signs of enhancing lesions.     Impression:  Do not suspect acute MS flare at this time although the patient has associated back pain and nausea that should be addressed. In addition, the patient has not followed up with a neurologist for a number of years and recommend close outpatient follow up.     Recommendations:    - Give short course of zofran on discharge to help control nausea  - Provide short course muscle relaxer, I.e methocarbamol or tizanidine   - If patient has continued dysuria then treat for UTI  - Schedule close outpatient follow up with neurology, particularly MS specialist (Dr. Melchor, Dr. Wade, or Dr. Marquez)   - We will sign off; please reach out if further concerns with neurological exam             VTE Risk Mitigation (From admission, onward)    None          Thank you for your consult. I will sign off. Please contact us if you have any additional questions.    Rafal Cintron MD  Neurology  [facility] - [facility]

## 2023-09-09 NOTE — ASSESSMENT & PLAN NOTE
57F with h/o relapsing/remitting MS, recent anaphylactic reaction to ant bite, BLE weakness with multiple falls presents with c/o progressive b/l hip pain, low back pain and gait difficulty with R L5/S1 disc herniation which does not completely explain all of patient's presenting symtpoms:    --Agree with observation for pain control, PT/OT. D/w ED      - q2h neurochecks in stepdown, q4h neurochecks on floor  --Hip/coccyx XR pending. MRI Brain, C/T/L reviewed as above  --PRN pain control  --Neurology consulted for recommendations regarding MS/MS flare off medications  --Follow-up full labs (CBC/CMP/PT-INR/PTT/T&S)  --PT/OT eval  --Continue to monitor clinically, notify NSGY immediately with any changes in neuro status    Dispo: ongoing    Will continue to follow while in patient. No acute neurosurgical intervention warranted at this time    D/w Dr. Noriega

## 2023-09-10 PROBLEM — M54.41 CHRONIC RIGHT-SIDED LOW BACK PAIN WITH RIGHT-SIDED SCIATICA: Status: ACTIVE | Noted: 2023-09-10

## 2023-09-10 PROBLEM — G89.29 CHRONIC RIGHT-SIDED LOW BACK PAIN WITH RIGHT-SIDED SCIATICA: Status: ACTIVE | Noted: 2023-09-10

## 2023-09-10 LAB — BACTERIA UR CULT: NO GROWTH

## 2023-09-10 NOTE — ED PROVIDER NOTES
Encounter Date: 9/9/2023       History     Chief Complaint   Patient presents with    Leg Pain     C/o pain from waist down since hx of falls approx x2 weeks, pain increasingly worsening, severe today, also reports hx of MS and afraid she may have spinal lesions now     HPI  Review of patient's allergies indicates:   Allergen Reactions    Allergen ext-venom-honey bee Swelling    Amoxicillin Nausea Only and Hives    Insect venom Anaphylaxis     Ant    Prednisone Anaphylaxis     psychosis  psychosis    Latex, natural rubber Swelling    Zithromax [azithromycin] Hives    Codeine Nausea And Vomiting    Zolpidem Nausea And Vomiting     HA  HA     Past Medical History:   Diagnosis Date    Asthma, currently dormant 5/13/2013    Constipation, chronic 5/16/2014    IBS - D    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 Jensen     Past Surgical History:   Procedure Laterality Date    APPENDECTOMY      AUGMENTATION OF BREAST      BREAST SURGERY      CHOLECYSTECTOMY       Family History   Problem Relation Age of Onset    Diabetes Mother     Cancer Mother 35        colon    Deep vein thrombosis Mother         during pregnancy    Multiple myeloma Mother     Stroke Father     Breast cancer Maternal Aunt 30    Breast cancer Maternal Aunt 50     Social History     Tobacco Use    Smoking status: Never    Smokeless tobacco: Never   Substance Use Topics    Alcohol use: Yes     Alcohol/week: 7.0 standard drinks of alcohol     Types: 7 Glasses of wine per week     Comment: champagne only/ occasional    Drug use: No     Review of Systems    Physical Exam     Initial Vitals [09/09/23 0658]   BP Pulse Resp Temp SpO2   (!) 143/76 65 18 98.1 °F (36.7 °C) 100 %      MAP       --          Physical Exam    ED Course   Procedures  Labs Reviewed   CBC W/ AUTO DIFFERENTIAL - Abnormal; Notable for the following components:       Result Value    MCH 31.7 (*)     MPV 8.7 (*)     All other components within normal limits   URINALYSIS, REFLEX TO URINE CULTURE - Abnormal; Notable for the following components:    Leukocytes, UA 2+ (*)     All other components within normal limits    Narrative:     Specimen Source->Urine   URINALYSIS MICROSCOPIC - Abnormal; Notable for the following components:    WBC, UA 13 (*)     All other components within normal limits    Narrative:     Specimen Source->Urine   CULTURE, URINE   COMPREHENSIVE METABOLIC PANEL   CK   SEDIMENTATION RATE   C-REACTIVE PROTEIN   PROTIME-INR   APTT   TYPE & SCREEN          Imaging Results              X-Ray Sacrum And Coccyx (Final result)  Result time 09/09/23 14:50:21      Final result by Michael Nicholas Jr., MD (09/09/23 14:50:21)                   Impression:      See above      Electronically signed by: Michael Nicholas MD  Date:    09/09/2023  Time:    14:50               Narrative:    EXAMINATION:  XR SACRUM AND COCCYX    CLINICAL HISTORY:  Unspecified fall, initial encounter    TECHNIQUE:  Two or three views of the sacrum and coccyx were performed.    COMPARISON:  None    FINDINGS:  Bones are fairly well mineralized.  Stool and bowel gas overlies the sacrum and coccyx.  No convincing fracture.                                       X-Ray Hips Bilateral 2 View Incl AP Pelvis (Final result)  Result time 09/09/23 14:48:29      Final result by Michael Nicholas Jr., MD (09/09/23 14:48:29)                   Impression:      See above      Electronically signed by: Michael Nicholas MD  Date:    09/09/2023  Time:    14:48               Narrative:    EXAMINATION:  XR HIPS BILATERAL 2 VIEW INCL AP PELVIS    CLINICAL HISTORY:  Unspecified fall, initial encounter    TECHNIQUE:  AP view of the pelvis and frogleg lateral views of both hips were  performed.    COMPARISON:  None.    FINDINGS:  Bones are well mineralized.  Alignment appears satisfactory.  Scattered stool and bowel gas noted.  No fracture.                                       MRI Brain Demyelinating W W/O Contrast (Final result)  Result time 09/09/23 11:35:56      Final result by Eric Montoya DO (09/09/23 11:35:56)                   Impression:      Few scattered small sized foci of T2 FLAIR signal hyperintensity within the supratentorial white matter most prominent clustered in the left posterior temporoparietal subcortical white matter.  These are nonspecific and prior areas of demyelination to be considered in differential in light of history.    No evidence for acute infarction or enhancing lesion to suggest active demyelination.    Clinical correlation advised.      Electronically signed by: Eric Montoya DO  Date:    09/09/2023  Time:    11:35               Narrative:    EXAMINATION:  MRI BRAIN DEMYELINATING W/ WO CONTRAST    CLINICAL HISTORY:  h/o MS, weakness and pain to legs;    TECHNIQUE:  Sagittal 3D space FLAIR which was reformatted in the coronal and sagittal planes.  Axial T2, axial gradient, axial T1 and axial diffusion imaging of the whole brain without contrast.  Following contrast administration axial T1 and sagittal T1 spoiled gradient which was reformatted in the coronal and axial planes were performed.  Six ML Gadavist intravenous contrast.    COMPARISON:  CT head 12/12/2018    FINDINGS:  There is no restricted diffusion to suggest acute or recent infarction.  There are few scattered small to punctate size foci of T2 FLAIR signal hyperintensity within the supratentorial parenchyma most numerous clustered in the left parietal and posterior temporal subcortical white matter.  No abnormal parenchymal susceptibility to suggest parenchymal hemorrhage.  Major intracranial skull base T2 flow voids are present.  There is no mass effect or midline shift.  Major intracranial  skull base T2 flow voids are present.  No abnormal parenchymal susceptibility to suggest parenchymal hemorrhage. Mild left maxillary mucosal thickening with trace ethmoid air cell opacities bilaterally.                        Final result by Eric Montoya DO (09/09/23 11:35:56)                   Impression:      Few scattered small sized foci of T2 FLAIR signal hyperintensity within the supratentorial white matter most prominent clustered in the left posterior temporoparietal subcortical white matter.  These are nonspecific and prior areas of demyelination to be considered in differential in light of history.    No evidence for acute infarction or enhancing lesion to suggest active demyelination.    Clinical correlation advised.      Electronically signed by: Eric Montoya DO  Date:    09/09/2023  Time:    11:35               Narrative:    EXAMINATION:  MRI BRAIN DEMYELINATING W/ WO CONTRAST    CLINICAL HISTORY:  h/o MS, weakness and pain to legs;    TECHNIQUE:  Sagittal 3D space FLAIR which was reformatted in the coronal and sagittal planes.  Axial T2, axial gradient, axial T1 and axial diffusion imaging of the whole brain without contrast.  Following contrast administration axial T1 and sagittal T1 spoiled gradient which was reformatted in the coronal and axial planes were performed.  Six ML Gadavist intravenous contrast.    COMPARISON:  CT head 12/12/2018    FINDINGS:  There is no restricted diffusion to suggest acute or recent infarction.  There are few scattered small to punctate size foci of T2 FLAIR signal hyperintensity within the supratentorial parenchyma most numerous clustered in the left parietal and posterior temporal subcortical white matter.  No abnormal parenchymal susceptibility to suggest parenchymal hemorrhage.  Major intracranial skull base T2 flow voids are present.  There is no mass effect or midline shift.  Major intracranial skull base T2 flow voids are present.  No abnormal parenchymal  susceptibility to suggest parenchymal hemorrhage. Mild left maxillary mucosal thickening with trace ethmoid air cell opacities bilaterally.                                        MRI Lumbar Spine W WO Cont (Final result)  Result time 09/09/23 11:38:15      Final result by Eric Montoya DO (09/09/23 11:38:15)                   Impression:      Allowing for artifact from motion no definite edema signal abnormality within the spinal cord to suggest sequela of demyelination or active myelitis.  There is no abnormal cord enhancement    There are degenerative changes as detailed above with L5/S1 posterior disc osteophyte and superimposed right paracentral disc extrusion.  Mild resulting central canal stenosis with probable impression on the descending right S1 nerve root clinical correlation for right S1 nerve root radiculopathy advised    Incidental probable enlargement ascending aorta partially visualized measuring approximately 4.1 cm.    Please see above for additional details.      Electronically signed by: Eric Montoya DO  Date:    09/09/2023  Time:    11:38               Narrative:    EXAMINATION:  MRI CERVICAL SPINE DEMYELINATING W W/O CONTRAST; MRI THORACIC SPINE DEMYELINATING W W/O CONTRAST; MRI LUMBAR SPINE W WO CONTRAST    CLINICAL HISTORY:  h/o MS. weakness and pain to lower legs;; h/o MS, weakness and pain to legs;; Demyelinating disease;.    TECHNIQUE:  Technique: sagittal T1, T2 and STIR and axial T1 and T2 imaging of the cervical, thoracic and lumbarspine without contrast. Additionally axial T1 and sagittal fat sat T1 post contrast imaging of the cervical, thoracic and lumbar spine.  Six ml of Gadavist contrast was infused intravenously.    .    COMPARISON:  Radiograph cervical and thoracolumbar spine 11/15/2021    FINDINGS:  Cervical spine:Cervical sagittal alignment slightly straightened with trace grade 1 retrolisthesis of C5 on C6.  There is degenerative disc disease with disc desiccation height  loss and endplate degeneration all levels most pronounced at C6/C7 with moderate height loss.  Allowing for degenerative changes the cervical vertebral body heights and contours are relatively stable without evidence for acute fracture.  Craniocervical junction within normal limits.  Allowing for artifact from motion there is no definite cervical cord signal abnormality to suggest edema.  No abnormal intrathecal enhancement    C2/C3: No significant disc bulge, central canal or neural foraminal stenosis.    C3/C4: Posterior disc osteophyte with uncovertebral joint hypertrophy without significant central canal stenosis with mild neural foraminal narrowing.    C4/C5: Posterior disc osteophyte with uncovertebral joint hypertrophy and facet arthropathy without significant central canal stenosis mild neural foraminal stenosis.    C5/C6: Posterior disc osteophyte with uncovertebral joint hypertrophy and facet arthropathy mild moderate central canal stenosis with bilateral neural foraminal stenosis.    C6/C7: Posterior disc osteophyte with uncovertebral joint hypertrophy and facet arthropathy mild central canal and bilateral neural foraminal stenosis.    C7/T1: No significant disc bulge, central canal or neural foraminal stenosis.    Thoracic spine:The thoracic sagittal alignment is within normal limits. The thoracic vertebral body heights, and contours are within normal limits without evidence for acute fracture or subluxation.  There is mild moderate endplate degenerative change T6/T7 level.    Thoracic spinal cord normal in signal and contour allowing for motion artifacts without cord signal abnormality to suggest edema.  Tip of the conus approximates the inferior L1 level.    No abnormal intrathecal enhancement.    No significant disc bulge, central canal or neural foraminal stenosis throughout the thoracic canal.    Incidental enlargement of the visualized ascending aorta measuring approximately 4.1 cm image 23 series  19.    Lumbar spine: Lumbar sagittal alignment within normal limits.  The lumbar vertebral body heights, contours and bone marrow signal is within normal limits and without evidence for acute fracture or subluxation.    The distal spinal cord and conus is normal in signal and contour the tip of the conus approximates the inferior [id number].    T12/L1 and L1/L2 levels: No significant disc bulge, central canal or neural foraminal stenosis.    L2/L3: Small bulging disc without significant central canal or neural foraminal stenosis    L3/L4: No significant disc bulge, central canal or neural foraminal stenosis.    L4/L5: Posterior disc osteophyte with ligamentum flavum hypertrophy and facet arthropathy without significant central canal stenosis with mild bilateral neural foraminal stenosis right greater than left.    L5/S1: Posterior disc osteophyte with superimposed right paracentral disc extrusion with disc material extending approximately 7 mm craniocaudal in the ventral epidural space.  Mass effect on the central canal with mild central canal stenosis with probable impression on the descending right S1 nerve root underlying right S1 nerve root radiculopathy to be considered.    Thin margin of probable reactive enhancement along the margin of the disc extrusion.    This report was flagged in [employer] as abnormal.                        Final result by Eric Montoya DO (09/09/23 11:38:15)                   Impression:      Allowing for artifact from motion no definite edema signal abnormality within the spinal cord to suggest sequela of demyelination or active myelitis.  There is no abnormal cord enhancement    There are degenerative changes as detailed above with L5/S1 posterior disc osteophyte and superimposed right paracentral disc extrusion.  Mild resulting central canal stenosis with probable impression on the descending right S1 nerve root clinical correlation for right S1 nerve root radiculopathy  advised    Incidental probable enlargement ascending aorta partially visualized measuring approximately 4.1 cm.    Please see above for additional details.      Electronically signed by: Eric Montoya DO  Date:    09/09/2023  Time:    11:38               Narrative:    EXAMINATION:  MRI CERVICAL SPINE DEMYELINATING W W/O CONTRAST; MRI THORACIC SPINE DEMYELINATING W W/O CONTRAST; MRI LUMBAR SPINE W WO CONTRAST    CLINICAL HISTORY:  h/o MS. weakness and pain to lower legs;; h/o MS, weakness and pain to legs;; Demyelinating disease;.    TECHNIQUE:  Technique: sagittal T1, T2 and STIR and axial T1 and T2 imaging of the cervical, thoracic and lumbarspine without contrast. Additionally axial T1 and sagittal fat sat T1 post contrast imaging of the cervical, thoracic and lumbar spine.  Six ml of Gadavist contrast was infused intravenously.    .    COMPARISON:  Radiograph cervical and thoracolumbar spine 11/15/2021    FINDINGS:  Cervical spine:Cervical sagittal alignment slightly straightened with trace grade 1 retrolisthesis of C5 on C6.  There is degenerative disc disease with disc desiccation height loss and endplate degeneration all levels most pronounced at C6/C7 with moderate height loss.  Allowing for degenerative changes the cervical vertebral body heights and contours are relatively stable without evidence for acute fracture.  Craniocervical junction within normal limits.  Allowing for artifact from motion there is no definite cervical cord signal abnormality to suggest edema.  No abnormal intrathecal enhancement    C2/C3: No significant disc bulge, central canal or neural foraminal stenosis.    C3/C4: Posterior disc osteophyte with uncovertebral joint hypertrophy without significant central canal stenosis with mild neural foraminal narrowing.    C4/C5: Posterior disc osteophyte with uncovertebral joint hypertrophy and facet arthropathy without significant central canal stenosis mild neural foraminal  stenosis.    C5/C6: Posterior disc osteophyte with uncovertebral joint hypertrophy and facet arthropathy mild moderate central canal stenosis with bilateral neural foraminal stenosis.    C6/C7: Posterior disc osteophyte with uncovertebral joint hypertrophy and facet arthropathy mild central canal and bilateral neural foraminal stenosis.    C7/T1: No significant disc bulge, central canal or neural foraminal stenosis.    Thoracic spine:The thoracic sagittal alignment is within normal limits. The thoracic vertebral body heights, and contours are within normal limits without evidence for acute fracture or subluxation.  There is mild moderate endplate degenerative change T6/T7 level.    Thoracic spinal cord normal in signal and contour allowing for motion artifacts without cord signal abnormality to suggest edema.  Tip of the conus approximates the inferior L1 level.    No abnormal intrathecal enhancement.    No significant disc bulge, central canal or neural foraminal stenosis throughout the thoracic canal.    Incidental enlargement of the visualized ascending aorta measuring approximately 4.1 cm image 23 series 19.    Lumbar spine: Lumbar sagittal alignment within normal limits.  The lumbar vertebral body heights, contours and bone marrow signal is within normal limits and without evidence for acute fracture or subluxation.    The distal spinal cord and conus is normal in signal and contour the tip of the conus approximates the inferior [id number].    T12/L1 and L1/L2 levels: No significant disc bulge, central canal or neural foraminal stenosis.    L2/L3: Small bulging disc without significant central canal or neural foraminal stenosis    L3/L4: No significant disc bulge, central canal or neural foraminal stenosis.    L4/L5: Posterior disc osteophyte with ligamentum flavum hypertrophy and facet arthropathy without significant central canal stenosis with mild bilateral neural foraminal stenosis right greater than  left.    L5/S1: Posterior disc osteophyte with superimposed right paracentral disc extrusion with disc material extending approximately 7 mm craniocaudal in the ventral epidural space.  Mass effect on the central canal with mild central canal stenosis with probable impression on the descending right S1 nerve root underlying right S1 nerve root radiculopathy to be considered.    Thin margin of probable reactive enhancement along the margin of the disc extrusion.    This report was flagged in Epic as abnormal.                        Final result by Eric Montoya DO (09/09/23 11:38:15)                   Impression:      Allowing for artifact from motion no definite edema signal abnormality within the spinal cord to suggest sequela of demyelination or active myelitis.  There is no abnormal cord enhancement    There are degenerative changes as detailed above with L5/S1 posterior disc osteophyte and superimposed right paracentral disc extrusion.  Mild resulting central canal stenosis with probable impression on the descending right S1 nerve root clinical correlation for right S1 nerve root radiculopathy advised    Incidental probable enlargement ascending aorta partially visualized measuring approximately 4.1 cm.    Please see above for additional details.      Electronically signed by: Eric Montoya DO  Date:    09/09/2023  Time:    11:38               Narrative:    EXAMINATION:  MRI CERVICAL SPINE DEMYELINATING W W/O CONTRAST; MRI THORACIC SPINE DEMYELINATING W W/O CONTRAST; MRI LUMBAR SPINE W WO CONTRAST    CLINICAL HISTORY:  h/o MS. weakness and pain to lower legs;; h/o MS, weakness and pain to legs;; Demyelinating disease;.    TECHNIQUE:  Technique: sagittal T1, T2 and STIR and axial T1 and T2 imaging of the cervical, thoracic and lumbarspine without contrast. Additionally axial T1 and sagittal fat sat T1 post contrast imaging of the cervical, thoracic and lumbar spine.  Six ml of Gadavist contrast was infused  intravenously.    .    COMPARISON:  Radiograph cervical and thoracolumbar spine 11/15/2021    FINDINGS:  Cervical spine:Cervical sagittal alignment slightly straightened with trace grade 1 retrolisthesis of C5 on C6.  There is degenerative disc disease with disc desiccation height loss and endplate degeneration all levels most pronounced at C6/C7 with moderate height loss.  Allowing for degenerative changes the cervical vertebral body heights and contours are relatively stable without evidence for acute fracture.  Craniocervical junction within normal limits.  Allowing for artifact from motion there is no definite cervical cord signal abnormality to suggest edema.  No abnormal intrathecal enhancement    C2/C3: No significant disc bulge, central canal or neural foraminal stenosis.    C3/C4: Posterior disc osteophyte with uncovertebral joint hypertrophy without significant central canal stenosis with mild neural foraminal narrowing.    C4/C5: Posterior disc osteophyte with uncovertebral joint hypertrophy and facet arthropathy without significant central canal stenosis mild neural foraminal stenosis.    C5/C6: Posterior disc osteophyte with uncovertebral joint hypertrophy and facet arthropathy mild moderate central canal stenosis with bilateral neural foraminal stenosis.    C6/C7: Posterior disc osteophyte with uncovertebral joint hypertrophy and facet arthropathy mild central canal and bilateral neural foraminal stenosis.    C7/T1: No significant disc bulge, central canal or neural foraminal stenosis.    Thoracic spine:The thoracic sagittal alignment is within normal limits. The thoracic vertebral body heights, and contours are within normal limits without evidence for acute fracture or subluxation.  There is mild moderate endplate degenerative change T6/T7 level.    Thoracic spinal cord normal in signal and contour allowing for motion artifacts without cord signal abnormality to suggest edema.  Tip of the conus  approximates the inferior L1 level.    No abnormal intrathecal enhancement.    No significant disc bulge, central canal or neural foraminal stenosis throughout the thoracic canal.    Incidental enlargement of the visualized ascending aorta measuring approximately 4.1 cm image 23 series 19.    Lumbar spine: Lumbar sagittal alignment within normal limits.  The lumbar vertebral body heights, contours and bone marrow signal is within normal limits and without evidence for acute fracture or subluxation.    The distal spinal cord and conus is normal in signal and contour the tip of the conus approximates the inferior [id number].    T12/L1 and L1/L2 levels: No significant disc bulge, central canal or neural foraminal stenosis.    L2/L3: Small bulging disc without significant central canal or neural foraminal stenosis    L3/L4: No significant disc bulge, central canal or neural foraminal stenosis.    L4/L5: Posterior disc osteophyte with ligamentum flavum hypertrophy and facet arthropathy without significant central canal stenosis with mild bilateral neural foraminal stenosis right greater than left.    L5/S1: Posterior disc osteophyte with superimposed right paracentral disc extrusion with disc material extending approximately 7 mm craniocaudal in the ventral epidural space.  Mass effect on the central canal with mild central canal stenosis with probable impression on the descending right S1 nerve root underlying right S1 nerve root radiculopathy to be considered.    Thin margin of probable reactive enhancement along the margin of the disc extrusion.    This report was flagged in Epic as abnormal.                                        MRI Thoracic Spine Demyelinating W W/O Contrast (Final result)  Result time 09/09/23 11:38:15      Final result by Eric Montoya DO (09/09/23 11:38:15)                   Impression:      Allowing for artifact from motion no definite edema signal abnormality within the spinal cord to suggest  sequela of demyelination or active myelitis.  There is no abnormal cord enhancement    There are degenerative changes as detailed above with L5/S1 posterior disc osteophyte and superimposed right paracentral disc extrusion.  Mild resulting central canal stenosis with probable impression on the descending right S1 nerve root clinical correlation for right S1 nerve root radiculopathy advised    Incidental probable enlargement ascending aorta partially visualized measuring approximately 4.1 cm.    Please see above for additional details.      Electronically signed by: Eric Montoya DO  Date:    09/09/2023  Time:    11:38               Narrative:    EXAMINATION:  MRI CERVICAL SPINE DEMYELINATING W W/O CONTRAST; MRI THORACIC SPINE DEMYELINATING W W/O CONTRAST; MRI LUMBAR SPINE W WO CONTRAST    CLINICAL HISTORY:  h/o MS. weakness and pain to lower legs;; h/o MS, weakness and pain to legs;; Demyelinating disease;.    TECHNIQUE:  Technique: sagittal T1, T2 and STIR and axial T1 and T2 imaging of the cervical, thoracic and lumbarspine without contrast. Additionally axial T1 and sagittal fat sat T1 post contrast imaging of the cervical, thoracic and lumbar spine.  Six ml of Gadavist contrast was infused intravenously.    .    COMPARISON:  Radiograph cervical and thoracolumbar spine 11/15/2021    FINDINGS:  Cervical spine:Cervical sagittal alignment slightly straightened with trace grade 1 retrolisthesis of C5 on C6.  There is degenerative disc disease with disc desiccation height loss and endplate degeneration all levels most pronounced at C6/C7 with moderate height loss.  Allowing for degenerative changes the cervical vertebral body heights and contours are relatively stable without evidence for acute fracture.  Craniocervical junction within normal limits.  Allowing for artifact from motion there is no definite cervical cord signal abnormality to suggest edema.  No abnormal intrathecal enhancement    C2/C3: No significant  disc bulge, central canal or neural foraminal stenosis.    C3/C4: Posterior disc osteophyte with uncovertebral joint hypertrophy without significant central canal stenosis with mild neural foraminal narrowing.    C4/C5: Posterior disc osteophyte with uncovertebral joint hypertrophy and facet arthropathy without significant central canal stenosis mild neural foraminal stenosis.    C5/C6: Posterior disc osteophyte with uncovertebral joint hypertrophy and facet arthropathy mild moderate central canal stenosis with bilateral neural foraminal stenosis.    C6/C7: Posterior disc osteophyte with uncovertebral joint hypertrophy and facet arthropathy mild central canal and bilateral neural foraminal stenosis.    C7/T1: No significant disc bulge, central canal or neural foraminal stenosis.    Thoracic spine:The thoracic sagittal alignment is within normal limits. The thoracic vertebral body heights, and contours are within normal limits without evidence for acute fracture or subluxation.  There is mild moderate endplate degenerative change T6/T7 level.    Thoracic spinal cord normal in signal and contour allowing for motion artifacts without cord signal abnormality to suggest edema.  Tip of the conus approximates the inferior L1 level.    No abnormal intrathecal enhancement.    No significant disc bulge, central canal or neural foraminal stenosis throughout the thoracic canal.    Incidental enlargement of the visualized ascending aorta measuring approximately 4.1 cm image 23 series 19.    Lumbar spine: Lumbar sagittal alignment within normal limits.  The lumbar vertebral body heights, contours and bone marrow signal is within normal limits and without evidence for acute fracture or subluxation.    The distal spinal cord and conus is normal in signal and contour the tip of the conus approximates the inferior [id number].    T12/L1 and L1/L2 levels: No significant disc bulge, central canal or neural foraminal stenosis.    L2/L3:  Small bulging disc without significant central canal or neural foraminal stenosis    L3/L4: No significant disc bulge, central canal or neural foraminal stenosis.    L4/L5: Posterior disc osteophyte with ligamentum flavum hypertrophy and facet arthropathy without significant central canal stenosis with mild bilateral neural foraminal stenosis right greater than left.    L5/S1: Posterior disc osteophyte with superimposed right paracentral disc extrusion with disc material extending approximately 7 mm craniocaudal in the ventral epidural space.  Mass effect on the central canal with mild central canal stenosis with probable impression on the descending right S1 nerve root underlying right S1 nerve root radiculopathy to be considered.    Thin margin of probable reactive enhancement along the margin of the disc extrusion.    This report was flagged in Epic as abnormal.                        Final result by Eric Montoya DO (09/09/23 11:38:15)                   Impression:      Allowing for artifact from motion no definite edema signal abnormality within the spinal cord to suggest sequela of demyelination or active myelitis.  There is no abnormal cord enhancement    There are degenerative changes as detailed above with L5/S1 posterior disc osteophyte and superimposed right paracentral disc extrusion.  Mild resulting central canal stenosis with probable impression on the descending right S1 nerve root clinical correlation for right S1 nerve root radiculopathy advised    Incidental probable enlargement ascending aorta partially visualized measuring approximately 4.1 cm.    Please see above for additional details.      Electronically signed by: Eric Montoya DO  Date:    09/09/2023  Time:    11:38               Narrative:    EXAMINATION:  MRI CERVICAL SPINE DEMYELINATING W W/O CONTRAST; MRI THORACIC SPINE DEMYELINATING W W/O CONTRAST; MRI LUMBAR SPINE W WO CONTRAST    CLINICAL HISTORY:  h/o MS. weakness and pain to  lower legs;; h/o MS, weakness and pain to legs;; Demyelinating disease;.    TECHNIQUE:  Technique: sagittal T1, T2 and STIR and axial T1 and T2 imaging of the cervical, thoracic and lumbarspine without contrast. Additionally axial T1 and sagittal fat sat T1 post contrast imaging of the cervical, thoracic and lumbar spine.  Six ml of Gadavist contrast was infused intravenously.    .    COMPARISON:  Radiograph cervical and thoracolumbar spine 11/15/2021    FINDINGS:  Cervical spine:Cervical sagittal alignment slightly straightened with trace grade 1 retrolisthesis of C5 on C6.  There is degenerative disc disease with disc desiccation height loss and endplate degeneration all levels most pronounced at C6/C7 with moderate height loss.  Allowing for degenerative changes the cervical vertebral body heights and contours are relatively stable without evidence for acute fracture.  Craniocervical junction within normal limits.  Allowing for artifact from motion there is no definite cervical cord signal abnormality to suggest edema.  No abnormal intrathecal enhancement    C2/C3: No significant disc bulge, central canal or neural foraminal stenosis.    C3/C4: Posterior disc osteophyte with uncovertebral joint hypertrophy without significant central canal stenosis with mild neural foraminal narrowing.    C4/C5: Posterior disc osteophyte with uncovertebral joint hypertrophy and facet arthropathy without significant central canal stenosis mild neural foraminal stenosis.    C5/C6: Posterior disc osteophyte with uncovertebral joint hypertrophy and facet arthropathy mild moderate central canal stenosis with bilateral neural foraminal stenosis.    C6/C7: Posterior disc osteophyte with uncovertebral joint hypertrophy and facet arthropathy mild central canal and bilateral neural foraminal stenosis.    C7/T1: No significant disc bulge, central canal or neural foraminal stenosis.    Thoracic spine:The thoracic sagittal alignment is within  normal limits. The thoracic vertebral body heights, and contours are within normal limits without evidence for acute fracture or subluxation.  There is mild moderate endplate degenerative change T6/T7 level.    Thoracic spinal cord normal in signal and contour allowing for motion artifacts without cord signal abnormality to suggest edema.  Tip of the conus approximates the inferior L1 level.    No abnormal intrathecal enhancement.    No significant disc bulge, central canal or neural foraminal stenosis throughout the thoracic canal.    Incidental enlargement of the visualized ascending aorta measuring approximately 4.1 cm image 23 series 19.    Lumbar spine: Lumbar sagittal alignment within normal limits.  The lumbar vertebral body heights, contours and bone marrow signal is within normal limits and without evidence for acute fracture or subluxation.    The distal spinal cord and conus is normal in signal and contour the tip of the conus approximates the inferior [id number].    T12/L1 and L1/L2 levels: No significant disc bulge, central canal or neural foraminal stenosis.    L2/L3: Small bulging disc without significant central canal or neural foraminal stenosis    L3/L4: No significant disc bulge, central canal or neural foraminal stenosis.    L4/L5: Posterior disc osteophyte with ligamentum flavum hypertrophy and facet arthropathy without significant central canal stenosis with mild bilateral neural foraminal stenosis right greater than left.    L5/S1: Posterior disc osteophyte with superimposed right paracentral disc extrusion with disc material extending approximately 7 mm craniocaudal in the ventral epidural space.  Mass effect on the central canal with mild central canal stenosis with probable impression on the descending right S1 nerve root underlying right S1 nerve root radiculopathy to be considered.    Thin margin of probable reactive enhancement along the margin of the disc extrusion.    This report was  flagged in Epic as abnormal.                                        MRI Cervical Spine Demyelinating W W/O Contrast (Final result)  Result time 09/09/23 11:38:15      Final result by Eric Montoya DO (09/09/23 11:38:15)                   Impression:      Allowing for artifact from motion no definite edema signal abnormality within the spinal cord to suggest sequela of demyelination or active myelitis.  There is no abnormal cord enhancement    There are degenerative changes as detailed above with L5/S1 posterior disc osteophyte and superimposed right paracentral disc extrusion.  Mild resulting central canal stenosis with probable impression on the descending right S1 nerve root clinical correlation for right S1 nerve root radiculopathy advised    Incidental probable enlargement ascending aorta partially visualized measuring approximately 4.1 cm.    Please see above for additional details.      Electronically signed by: Eric Montoya DO  Date:    09/09/2023  Time:    11:38               Narrative:    EXAMINATION:  MRI CERVICAL SPINE DEMYELINATING W W/O CONTRAST; MRI THORACIC SPINE DEMYELINATING W W/O CONTRAST; MRI LUMBAR SPINE W WO CONTRAST    CLINICAL HISTORY:  h/o MS. weakness and pain to lower legs;; h/o MS, weakness and pain to legs;; Demyelinating disease;.    TECHNIQUE:  Technique: sagittal T1, T2 and STIR and axial T1 and T2 imaging of the cervical, thoracic and lumbarspine without contrast. Additionally axial T1 and sagittal fat sat T1 post contrast imaging of the cervical, thoracic and lumbar spine.  Six ml of Gadavist contrast was infused intravenously.    .    COMPARISON:  Radiograph cervical and thoracolumbar spine 11/15/2021    FINDINGS:  Cervical spine:Cervical sagittal alignment slightly straightened with trace grade 1 retrolisthesis of C5 on C6.  There is degenerative disc disease with disc desiccation height loss and endplate degeneration all levels most pronounced at C6/C7 with moderate height  loss.  Allowing for degenerative changes the cervical vertebral body heights and contours are relatively stable without evidence for acute fracture.  Craniocervical junction within normal limits.  Allowing for artifact from motion there is no definite cervical cord signal abnormality to suggest edema.  No abnormal intrathecal enhancement    C2/C3: No significant disc bulge, central canal or neural foraminal stenosis.    C3/C4: Posterior disc osteophyte with uncovertebral joint hypertrophy without significant central canal stenosis with mild neural foraminal narrowing.    C4/C5: Posterior disc osteophyte with uncovertebral joint hypertrophy and facet arthropathy without significant central canal stenosis mild neural foraminal stenosis.    C5/C6: Posterior disc osteophyte with uncovertebral joint hypertrophy and facet arthropathy mild moderate central canal stenosis with bilateral neural foraminal stenosis.    C6/C7: Posterior disc osteophyte with uncovertebral joint hypertrophy and facet arthropathy mild central canal and bilateral neural foraminal stenosis.    C7/T1: No significant disc bulge, central canal or neural foraminal stenosis.    Thoracic spine:The thoracic sagittal alignment is within normal limits. The thoracic vertebral body heights, and contours are within normal limits without evidence for acute fracture or subluxation.  There is mild moderate endplate degenerative change T6/T7 level.    Thoracic spinal cord normal in signal and contour allowing for motion artifacts without cord signal abnormality to suggest edema.  Tip of the conus approximates the inferior L1 level.    No abnormal intrathecal enhancement.    No significant disc bulge, central canal or neural foraminal stenosis throughout the thoracic canal.    Incidental enlargement of the visualized ascending aorta measuring approximately 4.1 cm image 23 series 19.    Lumbar spine: Lumbar sagittal alignment within normal limits.  The lumbar  vertebral body heights, contours and bone marrow signal is within normal limits and without evidence for acute fracture or subluxation.    The distal spinal cord and conus is normal in signal and contour the tip of the conus approximates the inferior T2httpa.    T12/L1 and L1/L2 levels: No significant disc bulge, central canal or neural foraminal stenosis.    L2/L3: Small bulging disc without significant central canal or neural foraminal stenosis    L3/L4: No significant disc bulge, central canal or neural foraminal stenosis.    L4/L5: Posterior disc osteophyte with ligamentum flavum hypertrophy and facet arthropathy without significant central canal stenosis with mild bilateral neural foraminal stenosis right greater than left.    L5/S1: Posterior disc osteophyte with superimposed right paracentral disc extrusion with disc material extending approximately 7 mm craniocaudal in the ventral epidural space.  Mass effect on the central canal with mild central canal stenosis with probable impression on the descending right S1 nerve root underlying right S1 nerve root radiculopathy to be considered.    Thin margin of probable reactive enhancement along the margin of the disc extrusion.    This report was flagged in Epic as abnormal.                        Final result by Eric Montoya DO (09/09/23 11:38:15)                   Impression:      Allowing for artifact from motion no definite edema signal abnormality within the spinal cord to suggest sequela of demyelination or active myelitis.  There is no abnormal cord enhancement    There are degenerative changes as detailed above with L5/S1 posterior disc osteophyte and superimposed right paracentral disc extrusion.  Mild resulting central canal stenosis with probable impression on the descending right S1 nerve root clinical correlation for right S1 nerve root radiculopathy advised    Incidental probable enlargement ascending aorta partially visualized measuring  approximately 4.1 cm.    Please see above for additional details.      Electronically signed by: Eric Montoya DO  Date:    09/09/2023  Time:    11:38               Narrative:    EXAMINATION:  MRI CERVICAL SPINE DEMYELINATING W W/O CONTRAST; MRI THORACIC SPINE DEMYELINATING W W/O CONTRAST; MRI LUMBAR SPINE W WO CONTRAST    CLINICAL HISTORY:  h/o MS. weakness and pain to lower legs;; h/o MS, weakness and pain to legs;; Demyelinating disease;.    TECHNIQUE:  Technique: sagittal T1, T2 and STIR and axial T1 and T2 imaging of the cervical, thoracic and lumbarspine without contrast. Additionally axial T1 and sagittal fat sat T1 post contrast imaging of the cervical, thoracic and lumbar spine.  Six ml of Gadavist contrast was infused intravenously.    .    COMPARISON:  Radiograph cervical and thoracolumbar spine 11/15/2021    FINDINGS:  Cervical spine:Cervical sagittal alignment slightly straightened with trace grade 1 retrolisthesis of C5 on C6.  There is degenerative disc disease with disc desiccation height loss and endplate degeneration all levels most pronounced at C6/C7 with moderate height loss.  Allowing for degenerative changes the cervical vertebral body heights and contours are relatively stable without evidence for acute fracture.  Craniocervical junction within normal limits.  Allowing for artifact from motion there is no definite cervical cord signal abnormality to suggest edema.  No abnormal intrathecal enhancement    C2/C3: No significant disc bulge, central canal or neural foraminal stenosis.    C3/C4: Posterior disc osteophyte with uncovertebral joint hypertrophy without significant central canal stenosis with mild neural foraminal narrowing.    C4/C5: Posterior disc osteophyte with uncovertebral joint hypertrophy and facet arthropathy without significant central canal stenosis mild neural foraminal stenosis.    C5/C6: Posterior disc osteophyte with uncovertebral joint hypertrophy and facet arthropathy  mild moderate central canal stenosis with bilateral neural foraminal stenosis.    C6/C7: Posterior disc osteophyte with uncovertebral joint hypertrophy and facet arthropathy mild central canal and bilateral neural foraminal stenosis.    C7/T1: No significant disc bulge, central canal or neural foraminal stenosis.    Thoracic spine:The thoracic sagittal alignment is within normal limits. The thoracic vertebral body heights, and contours are within normal limits without evidence for acute fracture or subluxation.  There is mild moderate endplate degenerative change T6/T7 level.    Thoracic spinal cord normal in signal and contour allowing for motion artifacts without cord signal abnormality to suggest edema.  Tip of the conus approximates the inferior L1 level.    No abnormal intrathecal enhancement.    No significant disc bulge, central canal or neural foraminal stenosis throughout the thoracic canal.    Incidental enlargement of the visualized ascending aorta measuring approximately 4.1 cm image 23 series 19.    Lumbar spine: Lumbar sagittal alignment within normal limits.  The lumbar vertebral body heights, contours and bone marrow signal is within normal limits and without evidence for acute fracture or subluxation.    The distal spinal cord and conus is normal in signal and contour the tip of the conus approximates the inferior [id number].    T12/L1 and L1/L2 levels: No significant disc bulge, central canal or neural foraminal stenosis.    L2/L3: Small bulging disc without significant central canal or neural foraminal stenosis    L3/L4: No significant disc bulge, central canal or neural foraminal stenosis.    L4/L5: Posterior disc osteophyte with ligamentum flavum hypertrophy and facet arthropathy without significant central canal stenosis with mild bilateral neural foraminal stenosis right greater than left.    L5/S1: Posterior disc osteophyte with superimposed right paracentral disc extrusion with disc material  extending approximately 7 mm craniocaudal in the ventral epidural space.  Mass effect on the central canal with mild central canal stenosis with probable impression on the descending right S1 nerve root underlying right S1 nerve root radiculopathy to be considered.    Thin margin of probable reactive enhancement along the margin of the disc extrusion.    This report was flagged in Epic as abnormal.                                       Medications   morphine injection 2 mg (2 mg Intravenous Given 9/9/23 0832)   ondansetron injection 4 mg (4 mg Intravenous Given 9/9/23 0904)   gadobutroL (GADAVIST) injection 6 mL (6 mLs Intravenous Given 9/9/23 1045)   morphine injection 2 mg (2 mg Intravenous Given 9/9/23 1256)   ondansetron injection 4 mg (4 mg Intravenous Given 9/9/23 1256)   ondansetron injection 4 mg (4 mg Intravenous Given 9/9/23 1702)   ondansetron disintegrating tablet 4 mg (4 mg Oral Given 9/9/23 1836)   methocarbamoL tablet 750 mg (750 mg Oral Given 9/9/23 1836)     Medical Decision Making  Amount and/or Complexity of Data Reviewed  Labs: ordered. Decision-making details documented in ED Course.  Radiology: ordered.    Risk  Prescription drug management.               ED Course as of 09/09/23 1913   Sat Sep 09, 2023   0926 WBC: 5.61  No leukocytosis  [AB]   0926 Creatinine: 0.8  Normal  [AB]   1045 Sed Rate: 2 [AB]   1045 Normal inflammatory markers  [AB]      ED Course User Index  [AB] Francisco Kirkpatrick MD                    Clinical Impression:   Final diagnoses:  [I77.810] Ascending aorta dilation (Primary)  [W19.XXXA] Fall  [R29.898] Weakness of lower extremity, unspecified laterality  [R11.2] Nausea and vomiting, unspecified vomiting type  [G35] MS (multiple sclerosis)  [N39.0] Urinary tract infection without hematuria, site unspecified        ED Disposition Condition    AMA Stable                Danyelle Cruz PA-C  09/11/23 2013

## 2023-09-10 NOTE — ED NOTES
Assumed care of pt at this time. Appears very uncomfortable, holding emesis bag. Reports nausea. Family at bedside. Safety protocols remain.        General: Awake, alert, uncomfortable, sitting with head down and gently swaying back and forth.  Skin: Visualized portions consistent with race, dry, intact.  Respiratory: No stridor. Even, unlabored respirations.  Cardiac: No acrocyanosis  GI: Reports nausea. No active vomiting on my assessment.  Neurological: Answers questions appropriately. Clear speech. Tremor noted

## 2023-09-10 NOTE — ED NOTES
Patient reports she is very uncomfortable and ready to leave. I removed her IV at her request.  at bedside, with patient's approval, is requesting that I speak with PA regarding diagnoses and referrals. ARACELI Bassett notified.

## 2023-09-11 ENCOUNTER — OFFICE VISIT (OUTPATIENT)
Dept: INTERNAL MEDICINE | Facility: CLINIC | Age: 58
End: 2023-09-11
Attending: INTERNAL MEDICINE
Payer: COMMERCIAL

## 2023-09-11 VITALS
SYSTOLIC BLOOD PRESSURE: 114 MMHG | HEIGHT: 66 IN | DIASTOLIC BLOOD PRESSURE: 72 MMHG | BODY MASS INDEX: 19.93 KG/M2 | WEIGHT: 124 LBS | OXYGEN SATURATION: 97 % | HEART RATE: 72 BPM

## 2023-09-11 DIAGNOSIS — M54.16 LUMBAR RADICULOPATHY: ICD-10-CM

## 2023-09-11 DIAGNOSIS — Q23.1 BICUSPID AORTIC VALVE: ICD-10-CM

## 2023-09-11 DIAGNOSIS — M54.41 ACUTE RIGHT-SIDED LOW BACK PAIN WITH RIGHT-SIDED SCIATICA: Primary | ICD-10-CM

## 2023-09-11 DIAGNOSIS — G35 MS (MULTIPLE SCLEROSIS): ICD-10-CM

## 2023-09-11 DIAGNOSIS — I71.21 ANEURYSM OF ASCENDING AORTA WITHOUT RUPTURE: ICD-10-CM

## 2023-09-11 DIAGNOSIS — I08.0 MILD MITRAL AND AORTIC REGURGITATION: ICD-10-CM

## 2023-09-11 PROCEDURE — 3044F PR MOST RECENT HEMOGLOBIN A1C LEVEL <7.0%: ICD-10-PCS | Mod: CPTII,S$GLB,, | Performed by: INTERNAL MEDICINE

## 2023-09-11 PROCEDURE — 3074F SYST BP LT 130 MM HG: CPT | Mod: CPTII,S$GLB,, | Performed by: INTERNAL MEDICINE

## 2023-09-11 PROCEDURE — 3074F PR MOST RECENT SYSTOLIC BLOOD PRESSURE < 130 MM HG: ICD-10-PCS | Mod: CPTII,S$GLB,, | Performed by: INTERNAL MEDICINE

## 2023-09-11 PROCEDURE — 3078F PR MOST RECENT DIASTOLIC BLOOD PRESSURE < 80 MM HG: ICD-10-PCS | Mod: CPTII,S$GLB,, | Performed by: INTERNAL MEDICINE

## 2023-09-11 PROCEDURE — 1160F RVW MEDS BY RX/DR IN RCRD: CPT | Mod: CPTII,S$GLB,, | Performed by: INTERNAL MEDICINE

## 2023-09-11 PROCEDURE — 3044F HG A1C LEVEL LT 7.0%: CPT | Mod: CPTII,S$GLB,, | Performed by: INTERNAL MEDICINE

## 2023-09-11 PROCEDURE — 1160F PR REVIEW ALL MEDS BY PRESCRIBER/CLIN PHARMACIST DOCUMENTED: ICD-10-PCS | Mod: CPTII,S$GLB,, | Performed by: INTERNAL MEDICINE

## 2023-09-11 PROCEDURE — 1159F MED LIST DOCD IN RCRD: CPT | Mod: CPTII,S$GLB,, | Performed by: INTERNAL MEDICINE

## 2023-09-11 PROCEDURE — 99214 OFFICE O/P EST MOD 30 MIN: CPT | Mod: S$GLB,,, | Performed by: INTERNAL MEDICINE

## 2023-09-11 PROCEDURE — 99214 PR OFFICE/OUTPT VISIT, EST, LEVL IV, 30-39 MIN: ICD-10-PCS | Mod: S$GLB,,, | Performed by: INTERNAL MEDICINE

## 2023-09-11 PROCEDURE — 3008F BODY MASS INDEX DOCD: CPT | Mod: CPTII,S$GLB,, | Performed by: INTERNAL MEDICINE

## 2023-09-11 PROCEDURE — 1159F PR MEDICATION LIST DOCUMENTED IN MEDICAL RECORD: ICD-10-PCS | Mod: CPTII,S$GLB,, | Performed by: INTERNAL MEDICINE

## 2023-09-11 PROCEDURE — 3008F PR BODY MASS INDEX (BMI) DOCUMENTED: ICD-10-PCS | Mod: CPTII,S$GLB,, | Performed by: INTERNAL MEDICINE

## 2023-09-11 PROCEDURE — 3078F DIAST BP <80 MM HG: CPT | Mod: CPTII,S$GLB,, | Performed by: INTERNAL MEDICINE

## 2023-09-11 NOTE — PROGRESS NOTES
Subjective     Patient ID: Hailey Sullivan is a 57 y.o. female.    Chief Complaint: Back Pain (A couple days after fall was picking up marble tiles feels as if her back is swollen ) and Fall (Fell down stairs dog nudged her causing her to fall down the stairs on her buttocks )    About 1 week ago her very large dog bumped her and she fell down the stairs.  The next day she tried to lift some heavy tile and developed pain in her lower back which radiates down to her right ankle.  She was in the emergency room yesterday and had MRIs of her head and spine.  Of note the MRIs of the brain did not show any enhancing lesions suggestive of active MS.  She does have a significant herniated disc of L5-S1 compressing the S1 nerve on the right.  She is in significant discomfort.  She takes Motrin for pain relief.    Back Pain  This is a new problem. The current episode started in the past 7 days. The problem occurs constantly. Pertinent negatives include no chest pain.   Fall      Review of Systems   Constitutional: Negative.    Respiratory:  Negative for shortness of breath.    Cardiovascular:  Negative for chest pain.   Musculoskeletal:  Positive for back pain.          Objective     Physical Exam  Vitals and nursing note reviewed.   Constitutional:       Appearance: She is well-developed.   HENT:      Head: Normocephalic.   Eyes:      Pupils: Pupils are equal, round, and reactive to light.   Cardiovascular:      Rate and Rhythm: Normal rate and regular rhythm.      Heart sounds: Murmur heard.      Crescendo decrescendo systolic murmur is present with a grade of 2/6.      Comments: @RUSB  Pulmonary:      Effort: Pulmonary effort is normal.   Musculoskeletal:      Lumbar back: Positive right straight leg raise test and positive left straight leg raise test.   Neurological:      Mental Status: She is alert.      Motor: No weakness.      Deep Tendon Reflexes:      Reflex Scores:       Patellar reflexes are 2+ on the  right side and 2+ on the left side.           Assessment and Plan     1. Acute right-sided low back pain with right-sided sciatica  Overview:  MRI 9/23 - Right S1 compression      2. Bicuspid aortic valve  Overview:  Mild AoVR - 8/16  Mild/Mod AoVR and mild AS - 8/18, 5/19, 12/20      3. Aneurysm of ascending aorta without rupture  Overview:  9/23 CT=4.1 cm  Repeat CT - 3/24      4. Lumbar radiculopathy  -     Ambulatory referral/consult to Neurosurgery; Future; Expected date: 09/18/2023    5. MS (multiple sclerosis)  Overview:  2004  MRI Brain 9/23 - No active demyelinization  MRI C,T, and L-spine - 9/23      6. Mild mitral and aortic regurgitation  Overview:  Echo 9/12, 9/14, 8/16  with MVP  Echo 8/18, 5/19, 12/20 - Mild MVR, Mild/Mod AoVR          Per orders and D/C instructions.  She has a known bicuspid aortic valve with mild mitral and aortic valve regurgitation, which are stable.  She was noted to have a 4.1 centimeter ascending thoracic aortic aneurysm on MRI yesterday.  We will order a follow-up CT in 6-12 months.  Refer to Dr. Faria for lower back pain with right-sided sciatica.  She has already been in contact with his office and they are planing on doing epidural injections.    Between 30 and 39 min of total time for evaluation and management services were spent on the patient today.  The medical problems and treatment options were discussed, and all questions were answered.              Follow up in about 6 months (around 3/11/2024).

## 2023-09-12 ENCOUNTER — PATIENT MESSAGE (OUTPATIENT)
Dept: NEUROSURGERY | Facility: CLINIC | Age: 58
End: 2023-09-12
Payer: COMMERCIAL

## 2023-09-12 ENCOUNTER — TELEPHONE (OUTPATIENT)
Dept: NEUROSURGERY | Facility: CLINIC | Age: 58
End: 2023-09-12
Payer: COMMERCIAL

## 2023-09-12 ENCOUNTER — TELEPHONE (OUTPATIENT)
Dept: CARDIOTHORACIC SURGERY | Facility: CLINIC | Age: 58
End: 2023-09-12
Payer: COMMERCIAL

## 2023-09-12 DIAGNOSIS — M51.26 HERNIATED LUMBAR INTERVERTEBRAL DISC: Primary | ICD-10-CM

## 2023-09-12 NOTE — TELEPHONE ENCOUNTER
----- Message from Kaitlynn Lozoya MD sent at 9/9/2023  4:33 PM CDT -----  Hi not sure who is taking over for Dr. Noriega yet.    Need to arrange outpatient follow up for L5/S1 disc hernation.    Has new MRI.    Thx!

## 2023-09-12 NOTE — TELEPHONE ENCOUNTER
Called pt to schedule appointment for CTS referral. Left message with clinic call back number.    Julie Haase RN  CTS Nurse Navigator [phone]

## 2023-09-15 ENCOUNTER — TELEPHONE (OUTPATIENT)
Dept: CARDIOTHORACIC SURGERY | Facility: CLINIC | Age: 58
End: 2023-09-15
Payer: COMMERCIAL

## 2023-09-15 DIAGNOSIS — I71.21 ANEURYSM OF ASCENDING AORTA WITHOUT RUPTURE: Primary | ICD-10-CM

## 2023-09-15 NOTE — TELEPHONE ENCOUNTER
Called and scheduled pt's consult for TAA (along with CT chest, non-contrast, per Dr. Coates) on 9/25, per pt's request.  Pt provided with appt date, times, and locations, which she verbalized understanding to.  Appt slips mailed to pt.

## 2023-09-19 ENCOUNTER — TELEPHONE (OUTPATIENT)
Dept: CARDIOTHORACIC SURGERY | Facility: CLINIC | Age: 58
End: 2023-09-19
Payer: COMMERCIAL

## 2023-09-19 DIAGNOSIS — I71.21 ANEURYSM OF ASCENDING AORTA WITHOUT RUPTURE: ICD-10-CM

## 2023-09-19 DIAGNOSIS — Q23.1 BICUSPID AORTIC VALVE: Primary | ICD-10-CM

## 2023-09-19 NOTE — TELEPHONE ENCOUNTER
Called and notified pt of a change to her appts on 9/25, as an Echo was added to her previously scheduled CT scan and consult with Dr. Coates, which shifted the time and location of her CT scan, which she verbalized understanding to.  Updated appt slips for 9/25 mailed to pt.

## 2023-09-22 ENCOUNTER — TELEPHONE (OUTPATIENT)
Dept: CARDIOTHORACIC SURGERY | Facility: CLINIC | Age: 58
End: 2023-09-22
Payer: COMMERCIAL

## 2023-09-22 NOTE — TELEPHONE ENCOUNTER
Called to confirm appts for 9/25; no answer.  Left VM requesting call back to confirm appts.    Called pt again this afternoon to confirm appts for 9/25; no answer.  Left a detailed VM with appt times, and locations, and requested a call back if pt has any questions.

## 2023-09-25 ENCOUNTER — HOSPITAL ENCOUNTER (OUTPATIENT)
Dept: CARDIOLOGY | Facility: HOSPITAL | Age: 58
Discharge: HOME | End: 2023-09-25
Attending: THORACIC SURGERY (CARDIOTHORACIC VASCULAR SURGERY)
Payer: COMMERCIAL

## 2023-09-25 VITALS
DIASTOLIC BLOOD PRESSURE: 70 MMHG | WEIGHT: 124 LBS | HEART RATE: 70 BPM | BODY MASS INDEX: 19.93 KG/M2 | HEIGHT: 66 IN | SYSTOLIC BLOOD PRESSURE: 120 MMHG

## 2023-09-25 DIAGNOSIS — Q23.1 BICUSPID AORTIC VALVE: ICD-10-CM

## 2023-09-25 DIAGNOSIS — I71.21 ANEURYSM OF ASCENDING AORTA WITHOUT RUPTURE: ICD-10-CM

## 2023-09-25 LAB
ASCENDING AORTA: 4.17 CM
AV INDEX (PROSTH): 0.68
AV MEAN GRADIENT: 6 MMHG
AV PEAK GRADIENT: 11 MMHG
AV VALVE AREA BY VELOCITY RATIO: 2.23 CM²
AV VALVE AREA: 2.4 CM²
AV VELOCITY RATIO: 0.63
BSA FOR ECHO PROCEDURE: 1.62 M2
CV ECHO LV RWT: 0.37 CM
DOP CALC AO PEAK VEL: 1.66 M/S
DOP CALC AO VTI: 41.18 CM
DOP CALC LVOT AREA: 3.5 CM2
DOP CALC LVOT DIAMETER: 2.12 CM
DOP CALC LVOT PEAK VEL: 1.05 M/S
DOP CALC LVOT STROKE VOLUME: 98.86 CM3
DOP CALCLVOT PEAK VEL VTI: 28.02 CM
E WAVE DECELERATION TIME: 165.78 MSEC
E/A RATIO: 0.93
E/E' RATIO: 5.78 M/S
ECHO LV POSTERIOR WALL: 0.77 CM (ref 0.6–1.1)
EJECTION FRACTION: 63 %
FRACTIONAL SHORTENING: 39 % (ref 28–44)
INTERVENTRICULAR SEPTUM: 0.87 CM (ref 0.6–1.1)
IVRT: 76.12 MSEC
LA MAJOR: 4.75 CM
LA MINOR: 5.31 CM
LA WIDTH: 3.81 CM
LEFT ATRIUM SIZE: 2.37 CM
LEFT ATRIUM VOLUME INDEX MOD: 44.1 ML/M2
LEFT ATRIUM VOLUME INDEX: 23.6 ML/M2
LEFT ATRIUM VOLUME MOD: 71.85 CM3
LEFT ATRIUM VOLUME: 38.49 CM3
LEFT INTERNAL DIMENSION IN SYSTOLE: 2.53 CM (ref 2.1–4)
LEFT VENTRICLE DIASTOLIC VOLUME INDEX: 46.02 ML/M2
LEFT VENTRICLE DIASTOLIC VOLUME: 75.02 ML
LEFT VENTRICLE MASS INDEX: 62 G/M2
LEFT VENTRICLE SYSTOLIC VOLUME INDEX: 14.1 ML/M2
LEFT VENTRICLE SYSTOLIC VOLUME: 22.98 ML
LEFT VENTRICULAR INTERNAL DIMENSION IN DIASTOLE: 4.12 CM (ref 3.5–6)
LEFT VENTRICULAR MASS: 101.41 G
LV LATERAL E/E' RATIO: 5.2 M/S
LV SEPTAL E/E' RATIO: 6.5 M/S
MV A" WAVE DURATION": 20.17 MSEC
MV PEAK A VEL: 0.56 M/S
MV PEAK E VEL: 0.52 M/S
MV STENOSIS PRESSURE HALF TIME: 48.08 MS
MV VALVE AREA P 1/2 METHOD: 4.58 CM2
PISA TR MAX VEL: 2.14 M/S
PULM VEIN S/D RATIO: 1.39
PV PEAK D VEL: 0.28 M/S
PV PEAK S VEL: 0.39 M/S
RA MAJOR: 4.63 CM
RA PRESSURE ESTIMATED: 3 MMHG
RA WIDTH: 3.82 CM
RIGHT VENTRICULAR END-DIASTOLIC DIMENSION: 3.35 CM
RV TB RVSP: 5 MMHG
SINUS: 4.45 CM
STJ: 3.61 CM
TDI LATERAL: 0.1 M/S
TDI SEPTAL: 0.08 M/S
TDI: 0.09 M/S
TR MAX PG: 18 MMHG
TRICUSPID ANNULAR PLANE SYSTOLIC EXCURSION: 2.22 CM
TV REST PULMONARY ARTERY PRESSURE: 21 MMHG
Z-SCORE OF LEFT VENTRICULAR DIMENSION IN END DIASTOLE: -1.1
Z-SCORE OF LEFT VENTRICULAR DIMENSION IN END SYSTOLE: -0.94

## 2023-09-25 PROCEDURE — 93306 ECHO (CUPID ONLY): ICD-10-PCS | Mod: 26,,, | Performed by: INTERNAL MEDICINE

## 2023-09-25 PROCEDURE — 93306 TTE W/DOPPLER COMPLETE: CPT | Mod: 26,,, | Performed by: INTERNAL MEDICINE

## 2023-09-25 PROCEDURE — 93306 TTE W/DOPPLER COMPLETE: CPT

## 2023-09-25 NOTE — PROGRESS NOTES
Subjective:      Patient ID: Hailey Sullivan is a 57 y.o. female.    Chief Complaint: No chief complaint on file.      HPI:  Hailey Sullivan is a 57 y.o. female who presents to an initial surgical evaluation for TAA. Medical conditions include MS. Had MRI of spine to evaluate bilateral leg pain and weakness 2/2 to fall from dog, incidentally found to have a TAA measuring 4.1 cm. Patient is asymptomatic from cardiac standpoint. She is active, able to perform ADLs independently. No assistive devices for walking. Has no history of HTN. BP well controlled. Tramaine tobacco and illicit drug use. Father passed at 52 from ascending aortic dissection. Echo significant for normal EF; mild AR, Sinus root 4.45.       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 Jensen     Past Surgical History:   Procedure Laterality Date    APPENDECTOMY      AUGMENTATION OF BREAST      BREAST SURGERY      CHOLECYSTECTOMY       Family History       Problem Relation (Age of Onset)    Breast cancer Maternal Aunt (30), Maternal Aunt (50)    Cancer Mother (35)    Deep vein thrombosis Mother    Diabetes Mother    Multiple myeloma Mother     Stroke Father          Social History     Socioeconomic History    Marital status:      Spouse name: Richie    Number of children: 1   Occupational History    Occupation:      Comment: [employer]    Occupation: Owns [employer]   Tobacco Use    Smoking status: Never    Smokeless tobacco: Never   Substance and Sexual Activity    Alcohol use: Yes     Alcohol/week: 7.0 standard drinks of alcohol     Types: 7 Glasses of wine per week     Comment: champagne only/ occasional    Drug use: No    Sexual activity: Yes     Partners: Male   Social History Narrative    Exercises.    2017 - Mother is terminally ill in [place],  in early 2018.       Current medications Reviewed  Current Outpatient Medications on File Prior to Visit   Medication Sig Dispense Refill    albuterol (PROVENTIL/VENTOLIN HFA) 90 mcg/actuation inhaler Inhale 1-2 puffs into the lungs every 6 (six) hours as needed for Wheezing. Rescue 1 g 0    ALPRAZolam (XANAX) 1 MG tablet TAKE 1/2 TO 1 TABLET BY MOUTH EVERY NIGHT AS NEEDED 30 tablet 2    cetirizine (ZYRTEC) 10 MG tablet Take 10 mg by mouth daily as needed.      cyanocobalamin (VITAMIN B-12) 1000 MCG tablet Place 1,000 mcg under the tongue once daily.      EPINEPHrine (EPIPEN 2-MAYKEL) 0.3 mg/0.3 mL AtIn Inject 0.3 mLs (0.3 mg total) into the muscle once. for 1 dose 0.3 mL 0    fluticasone (FLONASE) 50 mcg/actuation nasal spray 2 sprays by Each Nare route once daily.      multivitamin-zinc gluconate (MULTIVITAMINS-FORTIFIED A-D-K) 5 mg/mL Drop Take by mouth once daily.        omeprazole (PRILOSEC) 10 MG capsule Take 10 mg by mouth once daily.      ondansetron (ZOFRAN-ODT) 4 MG TbDL Take 1 tablet (4 mg total) by mouth every 8 (eight) hours as needed (nausea and vomiting). 16 tablet 0    ondansetron (ZOFRAN-ODT) 8 MG TbDL Take 1/2 to 1 tablet every 8 hours as needed for nausea 15 tablet 0     No current facility-administered medications on file prior to visit.       Review of Systems    Constitutional:  Negative for activity change, appetite change, fatigue and fever.   HENT:  Negative for nosebleeds.    Respiratory:  Negative for cough and shortness of breath.    Cardiovascular:  Negative for chest pain, palpitations and leg swelling.   Gastrointestinal:  Negative for abdominal distention, abdominal pain and nausea.   Genitourinary:  Negative for frequency.   Musculoskeletal:  Negative for arthralgias and myalgias.   Skin:  Negative for rash.   Neurological:  Negative for dizziness and numbness.   Hematological:  Does not bruise/bleed easily.     Objective:   Physical Exam  Constitutional:       Appearance: Normal appearance.   HENT:      Head: Normocephalic and atraumatic.      Nose: Nose normal.      Mouth/Throat:      Mouth: Mucous membranes are moist.   Eyes:      Extraocular Movements: Extraocular movements intact.   Cardiovascular:      Rate and Rhythm: Normal rate.   Pulmonary:      Effort: Pulmonary effort is normal.   Abdominal:      General: Abdomen is flat.   Musculoskeletal:         General: Normal range of motion.      Cervical back: Normal range of motion.   Skin:     General: Skin is warm and dry.      Capillary Refill: Capillary refill takes less than 2 seconds.      Coloration: Skin is not pale.   Neurological:      General: No focal deficit present.      Mental Status: She is alert.         Diagnostic Results: reviewed   CT chest 10/2/23- TAA 4.1 cm measured by Dr. Coates    ECHO 9/25/2023    Left Ventricle: The left ventricle is normal in size. Normal wall thickness. Normal wall motion. There is normal systolic function. Ejection fraction by visual approximation is 63%. There is normal diastolic function.    Right Ventricle: Normal right ventricular cavity size. Wall thickness is normal. Right ventricle wall motion  is normal. Systolic function is normal.    Aortic Valve: There is mild aortic regurgitation.    Tricuspid Valve: There is mild regurgitation.    Pulmonary Artery:  No pulmonary hypertension. The estimated pulmonary artery systolic pressure is 21 mmHg.    IVC/SVC: Normal venous pressure at 3 mmHg.  Sinus 4.45 cm     MRI thoracic spine   Incidental probable enlargement ascending aorta partially visualized measuring approximately 4.1 cm.     Assessment:   TAA   Plan:   Patient is a 56 y/o female with history of MS present  to clinic for incidentally found 4.1 cm TAA on MRI of spine. She is asymptomatic. no HTN, no tobacco use or 2nd smoke exposure. Father passed from ascending aortic dissection. TAA measured ~ 4.1 cm on CT chest.  Echo revealed  EF 63%; mild aortic regurgitation with dilated Ao root. Concerns for strong family history of aneurysm, will follow up with patient in 6 months with surveillance CT chest non-contrast and Echo.

## 2023-09-27 ENCOUNTER — TELEPHONE (OUTPATIENT)
Dept: CARDIOTHORACIC SURGERY | Facility: CLINIC | Age: 58
End: 2023-09-27
Payer: COMMERCIAL

## 2023-09-27 NOTE — TELEPHONE ENCOUNTER
Called and notified pt of her CT scan (chest, non-contrast) and appt with Dr. Coates on 10/2, which she verbalized understanding to.  Appt slips sent to pt.

## 2023-09-29 ENCOUNTER — TELEPHONE (OUTPATIENT)
Dept: CARDIOTHORACIC SURGERY | Facility: CLINIC | Age: 58
End: 2023-09-29
Payer: COMMERCIAL

## 2023-09-29 NOTE — TELEPHONE ENCOUNTER
Called and confirmed pt's appts for 10/2 with pt, including appt times and locations, which she verbalized understanding to.

## 2023-10-02 ENCOUNTER — OFFICE VISIT (OUTPATIENT)
Dept: CARDIOTHORACIC SURGERY | Facility: CLINIC | Age: 58
End: 2023-10-02
Payer: COMMERCIAL

## 2023-10-02 ENCOUNTER — HOSPITAL ENCOUNTER (OUTPATIENT)
Dept: RADIOLOGY | Facility: HOSPITAL | Age: 58
Discharge: HOME | End: 2023-10-02
Attending: THORACIC SURGERY (CARDIOTHORACIC VASCULAR SURGERY)
Payer: COMMERCIAL

## 2023-10-02 VITALS
BODY MASS INDEX: 20.18 KG/M2 | HEART RATE: 67 BPM | OXYGEN SATURATION: 100 % | WEIGHT: 125 LBS | DIASTOLIC BLOOD PRESSURE: 77 MMHG | SYSTOLIC BLOOD PRESSURE: 131 MMHG

## 2023-10-02 DIAGNOSIS — I77.810 ASCENDING AORTA DILATION: ICD-10-CM

## 2023-10-02 DIAGNOSIS — I71.21 ANEURYSM OF ASCENDING AORTA WITHOUT RUPTURE: Primary | ICD-10-CM

## 2023-10-02 DIAGNOSIS — I71.21 ANEURYSM OF ASCENDING AORTA WITHOUT RUPTURE: ICD-10-CM

## 2023-10-02 PROCEDURE — 1160F PR REVIEW ALL MEDS BY PRESCRIBER/CLIN PHARMACIST DOCUMENTED: ICD-10-PCS | Mod: CPTII,S$GLB,, | Performed by: THORACIC SURGERY (CARDIOTHORACIC VASCULAR SURGERY)

## 2023-10-02 PROCEDURE — 3008F BODY MASS INDEX DOCD: CPT | Mod: CPTII,S$GLB,, | Performed by: THORACIC SURGERY (CARDIOTHORACIC VASCULAR SURGERY)

## 2023-10-02 PROCEDURE — 71250 CT THORAX DX C-: CPT | Mod: 26,,, | Performed by: RADIOLOGY

## 2023-10-02 PROCEDURE — 3008F PR BODY MASS INDEX (BMI) DOCUMENTED: ICD-10-PCS | Mod: CPTII,S$GLB,, | Performed by: THORACIC SURGERY (CARDIOTHORACIC VASCULAR SURGERY)

## 2023-10-02 PROCEDURE — 99999 PR PBB SHADOW E&M-EST. PATIENT-LVL IV: ICD-10-PCS | Mod: PBBFAC,,, | Performed by: THORACIC SURGERY (CARDIOTHORACIC VASCULAR SURGERY)

## 2023-10-02 PROCEDURE — 3075F SYST BP GE 130 - 139MM HG: CPT | Mod: CPTII,S$GLB,, | Performed by: THORACIC SURGERY (CARDIOTHORACIC VASCULAR SURGERY)

## 2023-10-02 PROCEDURE — 99999 PR PBB SHADOW E&M-EST. PATIENT-LVL IV: CPT | Mod: PBBFAC,,, | Performed by: THORACIC SURGERY (CARDIOTHORACIC VASCULAR SURGERY)

## 2023-10-02 PROCEDURE — 3075F PR MOST RECENT SYSTOLIC BLOOD PRESS GE 130-139MM HG: ICD-10-PCS | Mod: CPTII,S$GLB,, | Performed by: THORACIC SURGERY (CARDIOTHORACIC VASCULAR SURGERY)

## 2023-10-02 PROCEDURE — 99205 OFFICE O/P NEW HI 60 MIN: CPT | Mod: S$GLB,,, | Performed by: THORACIC SURGERY (CARDIOTHORACIC VASCULAR SURGERY)

## 2023-10-02 PROCEDURE — 3044F PR MOST RECENT HEMOGLOBIN A1C LEVEL <7.0%: ICD-10-PCS | Mod: CPTII,S$GLB,, | Performed by: THORACIC SURGERY (CARDIOTHORACIC VASCULAR SURGERY)

## 2023-10-02 PROCEDURE — 1159F MED LIST DOCD IN RCRD: CPT | Mod: CPTII,S$GLB,, | Performed by: THORACIC SURGERY (CARDIOTHORACIC VASCULAR SURGERY)

## 2023-10-02 PROCEDURE — 99205 PR OFFICE/OUTPT VISIT, NEW, LEVL V, 60-74 MIN: ICD-10-PCS | Mod: S$GLB,,, | Performed by: THORACIC SURGERY (CARDIOTHORACIC VASCULAR SURGERY)

## 2023-10-02 PROCEDURE — 3078F DIAST BP <80 MM HG: CPT | Mod: CPTII,S$GLB,, | Performed by: THORACIC SURGERY (CARDIOTHORACIC VASCULAR SURGERY)

## 2023-10-02 PROCEDURE — 1159F PR MEDICATION LIST DOCUMENTED IN MEDICAL RECORD: ICD-10-PCS | Mod: CPTII,S$GLB,, | Performed by: THORACIC SURGERY (CARDIOTHORACIC VASCULAR SURGERY)

## 2023-10-02 PROCEDURE — 3078F PR MOST RECENT DIASTOLIC BLOOD PRESSURE < 80 MM HG: ICD-10-PCS | Mod: CPTII,S$GLB,, | Performed by: THORACIC SURGERY (CARDIOTHORACIC VASCULAR SURGERY)

## 2023-10-02 PROCEDURE — 1160F RVW MEDS BY RX/DR IN RCRD: CPT | Mod: CPTII,S$GLB,, | Performed by: THORACIC SURGERY (CARDIOTHORACIC VASCULAR SURGERY)

## 2023-10-02 PROCEDURE — 71250 CT THORAX DX C-: CPT | Mod: TC

## 2023-10-02 PROCEDURE — 3044F HG A1C LEVEL LT 7.0%: CPT | Mod: CPTII,S$GLB,, | Performed by: THORACIC SURGERY (CARDIOTHORACIC VASCULAR SURGERY)

## 2023-10-02 PROCEDURE — 71250 CT CHEST WITHOUT CONTRAST: ICD-10-PCS | Mod: 26,,, | Performed by: RADIOLOGY

## 2023-10-02 RX ORDER — ERGOCALCIFEROL (VITAMIN D2) 10 MCG
TABLET ORAL
COMMUNITY

## 2023-10-02 RX ORDER — DIAZEPAM 5 MG/1
5 TABLET ORAL
COMMUNITY
Start: 2023-09-20 | End: 2024-01-06

## 2023-10-27 DIAGNOSIS — F41.9 ANXIETY: ICD-10-CM

## 2023-10-27 RX ORDER — ALPRAZOLAM 1 MG/1
TABLET ORAL
Qty: 30 TABLET | Refills: 0 | Status: SHIPPED | OUTPATIENT
Start: 2023-10-27 | End: 2024-01-06

## 2024-01-06 DIAGNOSIS — F41.9 ANXIETY: ICD-10-CM

## 2024-01-06 RX ORDER — ALPRAZOLAM 1 MG/1
TABLET ORAL
Qty: 30 TABLET | Refills: 0 | Status: SHIPPED | OUTPATIENT
Start: 2024-01-06

## 2024-01-22 ENCOUNTER — PATIENT MESSAGE (OUTPATIENT)
Dept: PSYCHIATRY | Facility: CLINIC | Age: 59
End: 2024-01-22
Payer: COMMERCIAL

## 2024-03-07 ENCOUNTER — TELEPHONE (OUTPATIENT)
Dept: CARDIOTHORACIC SURGERY | Facility: CLINIC | Age: 59
End: 2024-03-07
Payer: COMMERCIAL

## 2024-03-07 DIAGNOSIS — Q23.1 BICUSPID AORTIC VALVE: ICD-10-CM

## 2024-03-07 DIAGNOSIS — I71.21 ANEURYSM OF ASCENDING AORTA WITHOUT RUPTURE: Primary | ICD-10-CM

## 2024-03-07 NOTE — TELEPHONE ENCOUNTER
Called pt from recall and scheduled 6 month TAA f/u with CT chest and TTE, per Dr. Coates, for 3/18, per pt's request.  Pt provided with appt date, locations and times, which she verbalized understanding to.  Appt slips mailed to pt.

## 2024-03-11 ENCOUNTER — LAB VISIT (OUTPATIENT)
Dept: LAB | Facility: OTHER | Age: 59
End: 2024-03-11
Attending: INTERNAL MEDICINE
Payer: COMMERCIAL

## 2024-03-11 ENCOUNTER — OFFICE VISIT (OUTPATIENT)
Dept: INTERNAL MEDICINE | Facility: CLINIC | Age: 59
End: 2024-03-11
Attending: INTERNAL MEDICINE
Payer: COMMERCIAL

## 2024-03-11 VITALS
DIASTOLIC BLOOD PRESSURE: 72 MMHG | OXYGEN SATURATION: 98 % | HEART RATE: 71 BPM | SYSTOLIC BLOOD PRESSURE: 118 MMHG | BODY MASS INDEX: 18.8 KG/M2 | WEIGHT: 117 LBS | HEIGHT: 66 IN

## 2024-03-11 DIAGNOSIS — K29.30 SUPERFICIAL GASTRITIS WITHOUT HEMORRHAGE, UNSPECIFIED CHRONICITY: ICD-10-CM

## 2024-03-11 DIAGNOSIS — E03.9 HYPOTHYROIDISM, UNSPECIFIED TYPE: ICD-10-CM

## 2024-03-11 DIAGNOSIS — I71.21 ANEURYSM OF ASCENDING AORTA WITHOUT RUPTURE: ICD-10-CM

## 2024-03-11 DIAGNOSIS — Z00.00 ROUTINE ADULT HEALTH MAINTENANCE: ICD-10-CM

## 2024-03-11 DIAGNOSIS — D50.8 OTHER IRON DEFICIENCY ANEMIA: ICD-10-CM

## 2024-03-11 DIAGNOSIS — F41.9 ANXIETY: ICD-10-CM

## 2024-03-11 DIAGNOSIS — E78.9 DISORDER OF LIPID METABOLISM: ICD-10-CM

## 2024-03-11 DIAGNOSIS — Q23.1 BICUSPID AORTIC VALVE: ICD-10-CM

## 2024-03-11 DIAGNOSIS — K59.09 CONSTIPATION, CHRONIC: Primary | ICD-10-CM

## 2024-03-11 DIAGNOSIS — M54.16 LUMBAR RADICULOPATHY, CHRONIC: ICD-10-CM

## 2024-03-11 DIAGNOSIS — R79.89 OTHER SPECIFIED ABNORMAL FINDINGS OF BLOOD CHEMISTRY: ICD-10-CM

## 2024-03-11 DIAGNOSIS — E55.9 VITAMIN D DEFICIENCY: ICD-10-CM

## 2024-03-11 DIAGNOSIS — D51.0 PERNICIOUS ANEMIA: ICD-10-CM

## 2024-03-11 DIAGNOSIS — M54.41 ACUTE RIGHT-SIDED LOW BACK PAIN WITH RIGHT-SIDED SCIATICA: ICD-10-CM

## 2024-03-11 DIAGNOSIS — I08.0 MILD MITRAL AND AORTIC REGURGITATION: ICD-10-CM

## 2024-03-11 DIAGNOSIS — Z00.00 ROUTINE ADULT HEALTH MAINTENANCE: Primary | ICD-10-CM

## 2024-03-11 DIAGNOSIS — M54.9 DORSALGIA, UNSPECIFIED: ICD-10-CM

## 2024-03-11 DIAGNOSIS — Z12.11 COLON CANCER SCREENING: ICD-10-CM

## 2024-03-11 LAB
25(OH)D3+25(OH)D2 SERPL-MCNC: 21 NG/ML (ref 30–96)
ALBUMIN SERPL BCP-MCNC: 4.4 G/DL (ref 3.5–5.2)
ALP SERPL-CCNC: 73 U/L (ref 55–135)
ALT SERPL W/O P-5'-P-CCNC: 25 U/L (ref 10–44)
ANION GAP SERPL CALC-SCNC: 7 MMOL/L (ref 8–16)
AST SERPL-CCNC: 32 U/L (ref 10–40)
BASOPHILS # BLD AUTO: 0.02 K/UL (ref 0–0.2)
BASOPHILS NFR BLD: 0.6 % (ref 0–1.9)
BILIRUB SERPL-MCNC: 0.8 MG/DL (ref 0.1–1)
BUN SERPL-MCNC: 8 MG/DL (ref 6–20)
CALCIUM SERPL-MCNC: 9.6 MG/DL (ref 8.7–10.5)
CHLORIDE SERPL-SCNC: 106 MMOL/L (ref 95–110)
CHOLEST SERPL-MCNC: 165 MG/DL (ref 120–199)
CHOLEST/HDLC SERPL: 3.8 {RATIO} (ref 2–5)
CO2 SERPL-SCNC: 27 MMOL/L (ref 23–29)
CREAT SERPL-MCNC: 0.8 MG/DL (ref 0.5–1.4)
DIFFERENTIAL METHOD BLD: ABNORMAL
EOSINOPHIL # BLD AUTO: 0.1 K/UL (ref 0–0.5)
EOSINOPHIL NFR BLD: 3.8 % (ref 0–8)
ERYTHROCYTE [DISTWIDTH] IN BLOOD BY AUTOMATED COUNT: 11.9 % (ref 11.5–14.5)
EST. GFR  (NO RACE VARIABLE): >60 ML/MIN/1.73 M^2
ESTIMATED AVG GLUCOSE: 100 MG/DL (ref 68–131)
GLUCOSE SERPL-MCNC: 88 MG/DL (ref 70–110)
HBA1C MFR BLD: 5.1 % (ref 4–5.6)
HCT VFR BLD AUTO: 40.4 % (ref 37–48.5)
HDLC SERPL-MCNC: 43 MG/DL (ref 40–75)
HDLC SERPL: 26.1 % (ref 20–50)
HGB BLD-MCNC: 13.4 G/DL (ref 12–16)
IMM GRANULOCYTES # BLD AUTO: 0.01 K/UL (ref 0–0.04)
IMM GRANULOCYTES NFR BLD AUTO: 0.3 % (ref 0–0.5)
LDLC SERPL CALC-MCNC: 97 MG/DL (ref 63–159)
LYMPHOCYTES # BLD AUTO: 1.1 K/UL (ref 1–4.8)
LYMPHOCYTES NFR BLD: 33 % (ref 18–48)
MCH RBC QN AUTO: 31.2 PG (ref 27–31)
MCHC RBC AUTO-ENTMCNC: 33.2 G/DL (ref 32–36)
MCV RBC AUTO: 94 FL (ref 82–98)
MONOCYTES # BLD AUTO: 0.4 K/UL (ref 0.3–1)
MONOCYTES NFR BLD: 11.5 % (ref 4–15)
NEUTROPHILS # BLD AUTO: 1.7 K/UL (ref 1.8–7.7)
NEUTROPHILS NFR BLD: 50.8 % (ref 38–73)
NONHDLC SERPL-MCNC: 122 MG/DL
NRBC BLD-RTO: 0 /100 WBC
PLATELET # BLD AUTO: 173 K/UL (ref 150–450)
PMV BLD AUTO: 8.9 FL (ref 9.2–12.9)
POTASSIUM SERPL-SCNC: 4.4 MMOL/L (ref 3.5–5.1)
PROT SERPL-MCNC: 7.2 G/DL (ref 6–8.4)
RBC # BLD AUTO: 4.3 M/UL (ref 4–5.4)
SODIUM SERPL-SCNC: 140 MMOL/L (ref 136–145)
TRIGL SERPL-MCNC: 125 MG/DL (ref 30–150)
TSH SERPL DL<=0.005 MIU/L-ACNC: 3.13 UIU/ML (ref 0.4–4)
VIT B12 SERPL-MCNC: 661 PG/ML (ref 210–950)
WBC # BLD AUTO: 3.39 K/UL (ref 3.9–12.7)

## 2024-03-11 PROCEDURE — 80053 COMPREHEN METABOLIC PANEL: CPT | Performed by: INTERNAL MEDICINE

## 2024-03-11 PROCEDURE — 3074F SYST BP LT 130 MM HG: CPT | Mod: CPTII,S$GLB,, | Performed by: INTERNAL MEDICINE

## 2024-03-11 PROCEDURE — 85025 COMPLETE CBC W/AUTO DIFF WBC: CPT | Performed by: INTERNAL MEDICINE

## 2024-03-11 PROCEDURE — 80061 LIPID PANEL: CPT | Performed by: INTERNAL MEDICINE

## 2024-03-11 PROCEDURE — 83036 HEMOGLOBIN GLYCOSYLATED A1C: CPT | Performed by: INTERNAL MEDICINE

## 2024-03-11 PROCEDURE — 82607 VITAMIN B-12: CPT | Performed by: INTERNAL MEDICINE

## 2024-03-11 PROCEDURE — 84443 ASSAY THYROID STIM HORMONE: CPT | Performed by: INTERNAL MEDICINE

## 2024-03-11 PROCEDURE — 3078F DIAST BP <80 MM HG: CPT | Mod: CPTII,S$GLB,, | Performed by: INTERNAL MEDICINE

## 2024-03-11 PROCEDURE — 3008F BODY MASS INDEX DOCD: CPT | Mod: CPTII,S$GLB,, | Performed by: INTERNAL MEDICINE

## 2024-03-11 PROCEDURE — 99396 PREV VISIT EST AGE 40-64: CPT | Mod: S$GLB,,, | Performed by: INTERNAL MEDICINE

## 2024-03-11 PROCEDURE — 36415 COLL VENOUS BLD VENIPUNCTURE: CPT | Performed by: INTERNAL MEDICINE

## 2024-03-11 PROCEDURE — 82306 VITAMIN D 25 HYDROXY: CPT | Performed by: INTERNAL MEDICINE

## 2024-03-11 RX ORDER — ALPRAZOLAM 1 MG/1
TABLET ORAL
Qty: 30 TABLET | Refills: 3 | Status: SHIPPED | OUTPATIENT
Start: 2024-03-11 | End: 2024-04-19 | Stop reason: SDUPTHER

## 2024-03-11 NOTE — PROGRESS NOTES
Subjective     Patient ID: Hailey Sullivan is a 58 y.o. female.    Chief Complaint: Annual Exam (Back pain had steroid shot recently which has worn off would like another MRI), Abdominal Pain (Recently felt backed up vomiting and diarrhea, now has abnormal bowel movements ), and Dizziness (Believes her sugar is off )    She fell down stairs in September of 2023 and her her back.  She had an epidural steroid injection in October of 2023.  She feels like her back has been getting worse recently.    She generally has some constipation and frequent abdominal bloating.        Adult Wellness Exam:    Mental Conditions: None  Depression Risk Factors: None  BMI: See Vital signs   Colon screen:    See Health Maintenance Report      Females: Mammogram and PAP: per Gyn                                 Vaccines (Flu, Adacel, Shingrix): See Health Maintenance Report  Routine labs (Cholesterol, Glucose/Hgb A1C, and TSH): Done or ordered.     The patient's current health status is: Good   Patient was educated on routine health maintenance. See Patient Instructions.                             Review of Systems   Respiratory:  Negative for shortness of breath.    Gastrointestinal:  Positive for abdominal distention and constipation.   Psychiatric/Behavioral:  Positive for sleep disturbance.           Objective     Physical Exam  Vitals and nursing note reviewed.   Constitutional:       Appearance: She is well-developed.   HENT:      Head: Normocephalic.   Eyes:      Pupils: Pupils are equal, round, and reactive to light.   Cardiovascular:      Rate and Rhythm: Normal rate and regular rhythm.      Heart sounds: Murmur heard.      Crescendo decrescendo systolic murmur is present with a grade of 2/6.      Comments: @RUSB  Pulmonary:      Effort: Pulmonary effort is normal.   Abdominal:      General: Bowel sounds are decreased. There is distension.      Palpations: Abdomen is soft.      Tenderness: There is no abdominal  tenderness.   Neurological:      Mental Status: She is alert.          Assessment and Plan     1. Constipation, chronic  Overview:  IBS - C      2. Bicuspid aortic valve  Overview:  Mild AoVR - 8/16  Mild/Mod AoVR and mild AS - 8/18, 5/19, 12/20      3. Mild mitral and aortic regurgitation  Overview:  Echo 9/12, 9/14, 8/16  with MVP  Echo 8/18, 5/19, 12/20 - Mild MVR, Mild/Mod AoVR      4. Superficial gastritis without hemorrhage, unspecified chronicity  Overview:  EGD 9/17  Dr. Dior      5. Aneurysm of ascending aorta without rupture  Overview:  9/23 CT=4.1 cm  Repeat CT - 3/24      6. Colon cancer screening  -     Ambulatory referral/consult to Gastroenterology; Future; Expected date: 03/18/2024    7. Dorsalgia, unspecified  -     MRI Lumbar Spine Without Contrast; Future; Expected date: 03/11/2024    8. Lumbar radiculopathy, chronic  -     MRI Lumbar Spine Without Contrast; Future; Expected date: 03/11/2024    9. Acute right-sided low back pain with right-sided sciatica  Overview:  MRI 9/23 - Right S1 compression  [facility] - 10/23      10. Routine adult health maintenance        Plan:  Per orders and D/C instructions.  She has an appointment with CV surgery for her ascending aortic artery aneurysm.  Her bicuspid aortic valve and aortic and mitral valve regurgitation are stable.  She is scheduled for an echo.  Refer to GI for IBS type C and colonoscopy.  MRI of lumbar spine to re-evaluate L5-S1 herniated desk.  Check routine labs        Follow up in about 6 months (around 9/11/2024).

## 2024-03-11 NOTE — PATIENT INSTRUCTIONS
If they do not call you and schedule within 2 business days, then please call [facility] radiology at [phone] to schedule your radiology test(s).  MRI of L-spine    Take MiraLax daily as needed for constipation.

## 2024-03-15 ENCOUNTER — TELEPHONE (OUTPATIENT)
Dept: CARDIOTHORACIC SURGERY | Facility: CLINIC | Age: 59
End: 2024-03-15
Payer: COMMERCIAL

## 2024-03-15 NOTE — TELEPHONE ENCOUNTER
Called to confirm appts for 3/18; no answer.  Left VM requesting call back if pt is unable to present for appts.

## 2024-03-18 ENCOUNTER — HOSPITAL ENCOUNTER (OUTPATIENT)
Dept: RADIOLOGY | Facility: HOSPITAL | Age: 59
Discharge: HOME | End: 2024-03-18
Attending: THORACIC SURGERY (CARDIOTHORACIC VASCULAR SURGERY)
Payer: COMMERCIAL

## 2024-03-18 ENCOUNTER — HOSPITAL ENCOUNTER (OUTPATIENT)
Dept: CARDIOLOGY | Facility: HOSPITAL | Age: 59
Discharge: HOME | End: 2024-03-18
Attending: THORACIC SURGERY (CARDIOTHORACIC VASCULAR SURGERY)
Payer: COMMERCIAL

## 2024-03-18 ENCOUNTER — OFFICE VISIT (OUTPATIENT)
Dept: CARDIOTHORACIC SURGERY | Facility: CLINIC | Age: 59
End: 2024-03-18
Attending: THORACIC SURGERY (CARDIOTHORACIC VASCULAR SURGERY)
Payer: COMMERCIAL

## 2024-03-18 VITALS
HEART RATE: 73 BPM | WEIGHT: 129.44 LBS | HEIGHT: 67 IN | DIASTOLIC BLOOD PRESSURE: 77 MMHG | BODY MASS INDEX: 20.31 KG/M2 | SYSTOLIC BLOOD PRESSURE: 131 MMHG | OXYGEN SATURATION: 100 %

## 2024-03-18 VITALS
WEIGHT: 117 LBS | HEIGHT: 66 IN | SYSTOLIC BLOOD PRESSURE: 120 MMHG | HEART RATE: 75 BPM | BODY MASS INDEX: 18.8 KG/M2 | DIASTOLIC BLOOD PRESSURE: 68 MMHG

## 2024-03-18 DIAGNOSIS — I71.21 ANEURYSM OF ASCENDING AORTA WITHOUT RUPTURE: ICD-10-CM

## 2024-03-18 DIAGNOSIS — I71.21 ANEURYSM OF ASCENDING AORTA WITHOUT RUPTURE: Primary | ICD-10-CM

## 2024-03-18 DIAGNOSIS — Q23.1 BICUSPID AORTIC VALVE: ICD-10-CM

## 2024-03-18 LAB
ASCENDING AORTA: 4.01 CM
AV INDEX (PROSTH): 0.63
AV MEAN GRADIENT: 6 MMHG
AV PEAK GRADIENT: 10 MMHG
AV VALVE AREA BY VELOCITY RATIO: 1.95 CM²
AV VALVE AREA: 1.97 CM²
AV VELOCITY RATIO: 0.62
BSA FOR ECHO PROCEDURE: 1.57 M2
CV ECHO LV RWT: 0.36 CM
DOP CALC AO PEAK VEL: 1.58 M/S
DOP CALC AO VTI: 39.68 CM
DOP CALC LVOT AREA: 3.1 CM2
DOP CALC LVOT DIAMETER: 2 CM
DOP CALC LVOT PEAK VEL: 0.98 M/S
DOP CALC LVOT STROKE VOLUME: 78.28 CM3
DOP CALCLVOT PEAK VEL VTI: 24.93 CM
E WAVE DECELERATION TIME: 193.78 MSEC
E/A RATIO: 1.31
E/E' RATIO: 6.18 M/S
ECHO LV POSTERIOR WALL: 0.76 CM (ref 0.6–1.1)
FRACTIONAL SHORTENING: 31 % (ref 28–44)
INTERVENTRICULAR SEPTUM: 0.76 CM (ref 0.6–1.1)
LA MAJOR: 5.59 CM
LA MINOR: 5.02 CM
LA WIDTH: 4.14 CM
LEFT ATRIUM SIZE: 2.69 CM
LEFT ATRIUM VOLUME INDEX MOD: 34.6 ML/M2
LEFT ATRIUM VOLUME INDEX: 31.5 ML/M2
LEFT ATRIUM VOLUME MOD: 55 CM3
LEFT ATRIUM VOLUME: 50.07 CM3
LEFT INTERNAL DIMENSION IN SYSTOLE: 2.89 CM (ref 2.1–4)
LEFT VENTRICLE DIASTOLIC VOLUME INDEX: 47.32 ML/M2
LEFT VENTRICLE DIASTOLIC VOLUME: 75.24 ML
LEFT VENTRICLE MASS INDEX: 60 G/M2
LEFT VENTRICLE SYSTOLIC VOLUME INDEX: 20.2 ML/M2
LEFT VENTRICLE SYSTOLIC VOLUME: 32.04 ML
LEFT VENTRICULAR INTERNAL DIMENSION IN DIASTOLE: 4.2 CM (ref 3.5–6)
LEFT VENTRICULAR MASS: 94.67 G
LV LATERAL E/E' RATIO: 4.86 M/S
LV SEPTAL E/E' RATIO: 8.5 M/S
MV A" WAVE DURATION": 14.08 MSEC
MV PEAK A VEL: 0.52 M/S
MV PEAK E VEL: 0.68 M/S
MV STENOSIS PRESSURE HALF TIME: 56.2 MS
MV VALVE AREA P 1/2 METHOD: 3.91 CM2
PISA TR MAX VEL: 2.07 M/S
PULM VEIN S/D RATIO: 1.06
PV PEAK D VEL: 0.5 M/S
PV PEAK S VEL: 0.53 M/S
RA MAJOR: 5.11 CM
RA PRESSURE ESTIMATED: 3 MMHG
RA WIDTH: 3.88 CM
RIGHT VENTRICULAR END-DIASTOLIC DIMENSION: 3.58 CM
RV TB RVSP: 5 MMHG
SINUS: 3.02 CM
TDI LATERAL: 0.14 M/S
TDI SEPTAL: 0.08 M/S
TDI: 0.11 M/S
TR MAX PG: 17 MMHG
TRICUSPID ANNULAR PLANE SYSTOLIC EXCURSION: 2.54 CM
TV REST PULMONARY ARTERY PRESSURE: 20 MMHG
Z-SCORE OF LEFT VENTRICULAR DIMENSION IN END DIASTOLE: -0.78
Z-SCORE OF LEFT VENTRICULAR DIMENSION IN END SYSTOLE: 0.21

## 2024-03-18 PROCEDURE — 3075F SYST BP GE 130 - 139MM HG: CPT | Mod: CPTII,S$GLB,, | Performed by: THORACIC SURGERY (CARDIOTHORACIC VASCULAR SURGERY)

## 2024-03-18 PROCEDURE — 1159F MED LIST DOCD IN RCRD: CPT | Mod: CPTII,S$GLB,, | Performed by: THORACIC SURGERY (CARDIOTHORACIC VASCULAR SURGERY)

## 2024-03-18 PROCEDURE — 71250 CT THORAX DX C-: CPT | Mod: 26,,, | Performed by: STUDENT IN AN ORGANIZED HEALTH CARE EDUCATION/TRAINING PROGRAM

## 2024-03-18 PROCEDURE — 1160F RVW MEDS BY RX/DR IN RCRD: CPT | Mod: CPTII,S$GLB,, | Performed by: THORACIC SURGERY (CARDIOTHORACIC VASCULAR SURGERY)

## 2024-03-18 PROCEDURE — 3008F BODY MASS INDEX DOCD: CPT | Mod: CPTII,S$GLB,, | Performed by: THORACIC SURGERY (CARDIOTHORACIC VASCULAR SURGERY)

## 2024-03-18 PROCEDURE — 93306 TTE W/DOPPLER COMPLETE: CPT

## 2024-03-18 PROCEDURE — 99215 OFFICE O/P EST HI 40 MIN: CPT | Mod: S$GLB,,, | Performed by: THORACIC SURGERY (CARDIOTHORACIC VASCULAR SURGERY)

## 2024-03-18 PROCEDURE — 93306 TTE W/DOPPLER COMPLETE: CPT | Mod: 26,,, | Performed by: INTERNAL MEDICINE

## 2024-03-18 PROCEDURE — 3078F DIAST BP <80 MM HG: CPT | Mod: CPTII,S$GLB,, | Performed by: THORACIC SURGERY (CARDIOTHORACIC VASCULAR SURGERY)

## 2024-03-18 PROCEDURE — 99999 PR PBB SHADOW E&M-EST. PATIENT-LVL III: CPT | Mod: PBBFAC,,, | Performed by: THORACIC SURGERY (CARDIOTHORACIC VASCULAR SURGERY)

## 2024-03-18 PROCEDURE — 71250 CT THORAX DX C-: CPT | Mod: TC

## 2024-03-18 PROCEDURE — 3044F HG A1C LEVEL LT 7.0%: CPT | Mod: CPTII,S$GLB,, | Performed by: THORACIC SURGERY (CARDIOTHORACIC VASCULAR SURGERY)

## 2024-03-18 RX ORDER — LANSOPRAZOLE 30 MG/1
30 CAPSULE, DELAYED RELEASE ORAL DAILY
COMMUNITY
Start: 2024-03-15

## 2024-03-18 NOTE — PROGRESS NOTES
Subjective:      Patient ID: aHiley Sullivan is a 58 y.o. female.    Chief Complaint: No chief complaint on file.      HPI:  Hailey Sullivan is a 58 y.o. female who presents to 6 month TAA follow up. Medical conditions include MS, strong familial history of TAA: father passed at 52 from ascending aortic dissection. Surveillance CT remains stable at 4.1 cm. Echo 3/18 mild - moderate AR, sinus root 3      Patient is asymptomatic from cardiac standpoint. She is active, able to perform ADLs independently. No assistive devices for walking. Has no history of HTN. BP remains well controlled. Tramaine tobacco and illicit drug use.     Current medications Reviewed  Current Outpatient Medications on File Prior to Visit   Medication Sig Dispense Refill    albuterol (PROVENTIL/VENTOLIN HFA) 90 mcg/actuation inhaler Inhale 1-2 puffs into the lungs every 6 (six) hours as needed for Wheezing. Rescue (Patient not taking: Reported on 10/2/2023) 1 g 0    ALPRAZolam (XANAX) 1 MG tablet TAKE 1/2 TO 1 TABLET BY MOUTH EVERY NIGHT AS NEEDED 30 tablet 3    ASCORBIC ACID, VITAMIN C, ORAL Take by mouth.      cetirizine (ZYRTEC) 10 MG tablet Take 10 mg by mouth daily as needed.      cyanocobalamin (VITAMIN B-12) 1000 MCG tablet Place 1,000 mcg under the tongue once daily.      EPINEPHrine (EPIPEN 2-MAYKEL) 0.3 mg/0.3 mL AtIn Inject 0.3 mLs (0.3 mg total) into the muscle once. for 1 dose 0.3 mL 0    ergocalciferol, vitamin D2, 10 mcg (400 unit) Tab Take by mouth.      fluticasone (FLONASE) 50 mcg/actuation nasal spray 2 sprays by Each Nare route once daily.      multivitamin-zinc gluconate (MULTIVITAMINS-FORTIFIED A-D-K) 5 mg/mL Drop Take by mouth once daily.        omeprazole (PRILOSEC) 10 MG capsule Take 10 mg by mouth once daily.      ZINC ORAL Take by mouth.       No current facility-administered medications on file prior to visit.       Review of Systems   Constitutional:  Negative for activity change, appetite change,  fatigue and fever.   HENT:  Negative for nosebleeds.    Respiratory:  Negative for cough and shortness of breath.    Cardiovascular:  Negative for chest pain, palpitations and leg swelling.   Gastrointestinal:  Negative for abdominal distention, abdominal pain and nausea.   Genitourinary:  Negative for frequency.   Musculoskeletal:  Negative for arthralgias and myalgias.   Skin:  Negative for rash.   Neurological:  Negative for dizziness and numbness.   Hematological:  Does not bruise/bleed easily.     Objective:   Physical Exam  Constitutional:       Appearance: Normal appearance.   HENT:      Head: Normocephalic and atraumatic.   Eyes:      Extraocular Movements: Extraocular movements intact.   Cardiovascular:      Rate and Rhythm: Normal rate.   Pulmonary:      Effort: Pulmonary effort is normal.   Abdominal:      General: Abdomen is flat.      Palpations: Abdomen is soft.   Musculoskeletal:         General: Normal range of motion.      Cervical back: Normal range of motion.   Skin:     General: Skin is warm and dry.      Capillary Refill: Capillary refill takes less than 2 seconds.      Coloration: Skin is not pale.   Neurological:      General: No focal deficit present.      Mental Status: She is alert.         Diagnotic Results: reviewed   TTE 3/18/2024    Left Ventricle: There is normal systolic function with a visually estimated ejection fraction of 55 - 60%. There is normal diastolic function. Normal left ventricular filling pressure.    Right Ventricle: Normal right ventricular cavity size. Wall thickness is normal. Right ventricle wall motion  is normal. Systolic function is normal.    Left Atrium: Left atrium is mildly dilated. Better seen in prior study    Aortic Valve: There is mild to moderate aortic regurgitation. Known bicuspid aortic valve not well seen on this study.    Aortic Valve: There is no stenosis. Aortic valve peak velocity is 1.58 m/s. Mean gradient is 6 mmHg. There is mild aortic  regurgitation.    Aorta: Aortic root is normal in size measuring 3.02 cm. Ascending aorta is normal measuring 4.01 cm and appears unchanged compared to prioor study./    Pulmonary Artery: The estimated pulmonary artery systolic pressure is 20 mmHg.    IVC/SVC: Normal venous pressure at 3 mmHg.    CT chest 3/18/23- measured by Dr. Coates  TAA 4.1cm         CT chest 10/2/23- TAA 4.1 cm measured by Dr. Coates     ECHO 9/25/2023    Left Ventricle: The left ventricle is normal in size. Normal wall thickness. Normal wall motion. There is normal systolic function. Ejection fraction by visual approximation is 63%. There is normal diastolic function.    Right Ventricle: Normal right ventricular cavity size. Wall thickness is normal. Right ventricle wall motion  is normal. Systolic function is normal.    Aortic Valve: There is mild aortic regurgitation.    Tricuspid Valve: There is mild regurgitation.    Pulmonary Artery: No pulmonary hypertension. The estimated pulmonary artery systolic pressure is 21 mmHg.    IVC/SVC: Normal venous pressure at 3 mmHg.  Sinus 4.45 cm      MRI thoracic spine   Incidental probable enlargement ascending aorta partially visualized measuring approximately 4.1 cm.  Assessment:   TAA   Bicuspid Ao valve   Plan:   All pertinent results reviewed. Patient with TAA and known bicuspid Ao valve. She also has strong  familial aortic dissection history, Father passed from ascending aortic dissection. Surveillance TAA remains stable at measured ~ 4.1 cm on CT chest. Echo revealed normal LVEF; mild-moderate aortic regurgitation with dilated Ao root. She remains asymptomatic. Given TAA with bicuspid, strong family hx, and mild mod AR on echo, it was appropriate to offer elective surgery to patient. Patient wishes to treat conservatively with surveillance imaging, is not ready for any surgical intervention at this time. Plan to follow up with patient in 6 months with surveillance CT chest non-contrast and  Echo, if continues to remain stable next visit then will do annual follow up visit as per patients wishes.

## 2024-04-19 DIAGNOSIS — F41.9 ANXIETY: ICD-10-CM

## 2024-04-19 RX ORDER — ALPRAZOLAM 1 MG/1
TABLET ORAL
Qty: 30 TABLET | Refills: 2 | Status: SHIPPED | OUTPATIENT
Start: 2024-04-19

## 2024-06-14 ENCOUNTER — HOSPITAL ENCOUNTER (EMERGENCY)
Facility: HOSPITAL | Age: 59
Discharge: HOME | End: 2024-06-14
Attending: EMERGENCY MEDICINE
Payer: COMMERCIAL

## 2024-06-14 VITALS
WEIGHT: 135 LBS | DIASTOLIC BLOOD PRESSURE: 63 MMHG | HEART RATE: 60 BPM | OXYGEN SATURATION: 100 % | TEMPERATURE: 98 F | SYSTOLIC BLOOD PRESSURE: 137 MMHG | BODY MASS INDEX: 21.14 KG/M2 | RESPIRATION RATE: 16 BRPM

## 2024-06-14 DIAGNOSIS — R42 LIGHTHEADED: ICD-10-CM

## 2024-06-14 DIAGNOSIS — R07.9 CHEST PAIN: Primary | ICD-10-CM

## 2024-06-14 LAB
ALBUMIN SERPL BCP-MCNC: 3.9 G/DL (ref 3.5–5.2)
ALP SERPL-CCNC: 72 U/L (ref 55–135)
ALT SERPL W/O P-5'-P-CCNC: 16 U/L (ref 10–44)
ANION GAP SERPL CALC-SCNC: 10 MMOL/L (ref 8–16)
AST SERPL-CCNC: 29 U/L (ref 10–40)
BASOPHILS # BLD AUTO: 0.03 K/UL (ref 0–0.2)
BASOPHILS NFR BLD: 0.5 % (ref 0–1.9)
BILIRUB SERPL-MCNC: 1 MG/DL (ref 0.1–1)
BILIRUB UR QL STRIP: NEGATIVE
BNP SERPL-MCNC: 66 PG/ML (ref 0–99)
BUN SERPL-MCNC: 11 MG/DL (ref 6–20)
CALCIUM SERPL-MCNC: 9.2 MG/DL (ref 8.7–10.5)
CHLORIDE SERPL-SCNC: 100 MMOL/L (ref 95–110)
CLARITY UR REFRACT.AUTO: CLEAR
CO2 SERPL-SCNC: 24 MMOL/L (ref 23–29)
COLOR UR AUTO: COLORLESS
CREAT SERPL-MCNC: 0.8 MG/DL (ref 0.5–1.4)
DIFFERENTIAL METHOD BLD: ABNORMAL
EOSINOPHIL # BLD AUTO: 0.1 K/UL (ref 0–0.5)
EOSINOPHIL NFR BLD: 2.2 % (ref 0–8)
ERYTHROCYTE [DISTWIDTH] IN BLOOD BY AUTOMATED COUNT: 11.9 % (ref 11.5–14.5)
EST. GFR  (NO RACE VARIABLE): >60 ML/MIN/1.73 M^2
GLUCOSE SERPL-MCNC: 93 MG/DL (ref 70–110)
GLUCOSE UR QL STRIP: NEGATIVE
HCT VFR BLD AUTO: 34.5 % (ref 37–48.5)
HCV AB SERPL QL IA: NORMAL
HGB BLD-MCNC: 12 G/DL (ref 12–16)
HGB UR QL STRIP: NEGATIVE
HIV 1+2 AB+HIV1 P24 AG SERPL QL IA: NORMAL
IMM GRANULOCYTES # BLD AUTO: 0.01 K/UL (ref 0–0.04)
IMM GRANULOCYTES NFR BLD AUTO: 0.2 % (ref 0–0.5)
KETONES UR QL STRIP: NEGATIVE
LEUKOCYTE ESTERASE UR QL STRIP: NEGATIVE
LYMPHOCYTES # BLD AUTO: 1.1 K/UL (ref 1–4.8)
LYMPHOCYTES NFR BLD: 19.8 % (ref 18–48)
MAGNESIUM SERPL-MCNC: 2.1 MG/DL (ref 1.6–2.6)
MCH RBC QN AUTO: 32.3 PG (ref 27–31)
MCHC RBC AUTO-ENTMCNC: 34.8 G/DL (ref 32–36)
MCV RBC AUTO: 93 FL (ref 82–98)
MONOCYTES # BLD AUTO: 0.4 K/UL (ref 0.3–1)
MONOCYTES NFR BLD: 7.1 % (ref 4–15)
NEUTROPHILS # BLD AUTO: 3.9 K/UL (ref 1.8–7.7)
NEUTROPHILS NFR BLD: 70.2 % (ref 38–73)
NITRITE UR QL STRIP: NEGATIVE
NRBC BLD-RTO: 0 /100 WBC
PH UR STRIP: 7 [PH] (ref 5–8)
PLATELET # BLD AUTO: 143 K/UL (ref 150–450)
PMV BLD AUTO: 9.1 FL (ref 9.2–12.9)
POTASSIUM SERPL-SCNC: 3.9 MMOL/L (ref 3.5–5.1)
PROT SERPL-MCNC: 6.6 G/DL (ref 6–8.4)
PROT UR QL STRIP: NEGATIVE
RBC # BLD AUTO: 3.72 M/UL (ref 4–5.4)
SODIUM SERPL-SCNC: 134 MMOL/L (ref 136–145)
SP GR UR STRIP: 1.01 (ref 1–1.03)
TROPONIN I SERPL DL<=0.01 NG/ML-MCNC: <0.006 NG/ML (ref 0–0.03)
TROPONIN I SERPL DL<=0.01 NG/ML-MCNC: <0.006 NG/ML (ref 0–0.03)
URN SPEC COLLECT METH UR: ABNORMAL
WBC # BLD AUTO: 5.51 K/UL (ref 3.9–12.7)

## 2024-06-14 PROCEDURE — 96361 HYDRATE IV INFUSION ADD-ON: CPT

## 2024-06-14 PROCEDURE — 83880 ASSAY OF NATRIURETIC PEPTIDE: CPT

## 2024-06-14 PROCEDURE — 87389 HIV-1 AG W/HIV-1&-2 AB AG IA: CPT | Performed by: EMERGENCY MEDICINE

## 2024-06-14 PROCEDURE — 85025 COMPLETE CBC W/AUTO DIFF WBC: CPT

## 2024-06-14 PROCEDURE — 25500020 PHARM REV CODE 255: Performed by: EMERGENCY MEDICINE

## 2024-06-14 PROCEDURE — 99285 EMERGENCY DEPT VISIT HI MDM: CPT | Mod: 25

## 2024-06-14 PROCEDURE — 93010 ELECTROCARDIOGRAM REPORT: CPT | Mod: ,,, | Performed by: INTERNAL MEDICINE

## 2024-06-14 PROCEDURE — 84484 ASSAY OF TROPONIN QUANT: CPT | Performed by: EMERGENCY MEDICINE

## 2024-06-14 PROCEDURE — 93005 ELECTROCARDIOGRAM TRACING: CPT

## 2024-06-14 PROCEDURE — 80053 COMPREHEN METABOLIC PANEL: CPT

## 2024-06-14 PROCEDURE — 86803 HEPATITIS C AB TEST: CPT | Performed by: EMERGENCY MEDICINE

## 2024-06-14 PROCEDURE — 81003 URINALYSIS AUTO W/O SCOPE: CPT

## 2024-06-14 PROCEDURE — 83735 ASSAY OF MAGNESIUM: CPT

## 2024-06-14 PROCEDURE — 63600175 PHARM REV CODE 636 W HCPCS

## 2024-06-14 PROCEDURE — 84484 ASSAY OF TROPONIN QUANT: CPT | Mod: 91

## 2024-06-14 PROCEDURE — 25000003 PHARM REV CODE 250

## 2024-06-14 PROCEDURE — 96374 THER/PROPH/DIAG INJ IV PUSH: CPT

## 2024-06-14 RX ORDER — ONDANSETRON HYDROCHLORIDE 2 MG/ML
4 INJECTION, SOLUTION INTRAVENOUS
Status: COMPLETED | OUTPATIENT
Start: 2024-06-14 | End: 2024-06-14

## 2024-06-14 RX ADMIN — SODIUM CHLORIDE 500 ML: 9 INJECTION, SOLUTION INTRAVENOUS at 07:06

## 2024-06-14 RX ADMIN — IOHEXOL 75 ML: 350 INJECTION, SOLUTION INTRAVENOUS at 06:06

## 2024-06-14 RX ADMIN — ONDANSETRON 4 MG: 2 INJECTION INTRAMUSCULAR; INTRAVENOUS at 05:06

## 2024-06-14 NOTE — ED PROVIDER NOTES
"Encounter Date: 6/14/2024       History     Chief Complaint   Patient presents with    Chest Pain     Hx AAA 4cm, has been "watching it"    Dizziness    Weakness     Onset 2:30pm today     58-year-old female with history of asthma, GERD, hypothyroidism, multiple sclerosis, and history as stated below who presents to the ED for chief complaint of midsternal chest pain and lightheadedness that started earlier today around 2:30 p.m..   is at bedside.  Patient is not experiencing dizziness as stated in the triage note.  She describes her midsternal chest pain as pressured and currently rates it 4/10 in severity.  She also endorses some numbness and tingling sensation of her left arm.  She took aspirin prior to arrival.  Patient states that she also has lightheadedness and nausea.  Her lightheadedness started when she was sitting down in a restaurant she said she has been eating and drinking well.  She denies any fevers, SOB, vomiting, or changes in urination.  Of note, patient states she was concerned about her aortic aneurysm and wanted to get evaluated in the ED.    The history is provided by the patient and the spouse. No  was used.     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 Jensen     Past Surgical History:   Procedure Laterality Date    APPENDECTOMY      AUGMENTATION OF BREAST      BREAST SURGERY      CHOLECYSTECTOMY       Family History   Problem Relation Name Age of Onset    Diabetes Mother      Cancer Mother  35        colon    Deep vein thrombosis Mother          during pregnancy    Multiple myeloma Mother      Stroke Father      Breast cancer Maternal Aunt  30    Breast cancer Maternal Aunt  50     Social History     Tobacco Use    Smoking status: Never    Smokeless tobacco: Never   Substance Use Topics    Alcohol use: Yes     Alcohol/week: 7.0 standard drinks of alcohol     Types: 7 Glasses of wine per week     Comment: champagne only/ occasional    Drug use: No     Review of Systems    Physical Exam     Initial Vitals [06/14/24 1653]   BP Pulse Resp Temp SpO2   (!) 160/97 69 20 98.2 °F (36.8 °C) 95 %      MAP       --         Physical Exam    Nursing note and vitals reviewed.  Constitutional: No distress.   Patient is laying in bed in no apparent distress   HENT:   Head: Normocephalic.   Oropharynx is clear, but mildly dry   Eyes: Conjunctivae and EOM are normal. No scleral icterus.   Neck: Neck supple.   Normal range of motion.  Cardiovascular:  Normal rate, regular rhythm, normal heart sounds and intact distal pulses.           Radial pulses and DP pulses are intact and symmetrical   Pulmonary/Chest: Breath sounds normal. No respiratory distress. She has no wheezes. She has no rhonchi. She has no rales. She exhibits no tenderness.   Abdominal: Abdomen is soft. She exhibits no distension. There is no abdominal tenderness. There is no rebound and no guarding.   Musculoskeletal:         General: No tenderness or edema. Normal range of motion.      Cervical back: Normal range of motion and neck supple.     Neurological: She is alert. She has normal strength. No sensory deficit.   5/5 motor strength and light touch sensation in all  extremities   Skin: Skin is warm. Capillary refill takes less than 2 seconds.   Psychiatric: She has a normal mood and affect.         ED Course   Procedures  Labs Reviewed   URINALYSIS, REFLEX TO URINE CULTURE - Abnormal; Notable for the following components:       Result Value    Color, UA Colorless (*)     All other components within normal limits    Narrative:     Specimen Source->Urine   COMPREHENSIVE METABOLIC PANEL - Abnormal; Notable for the following components:    Sodium 134 (*)     All other components within normal limits    Narrative:     Release to patient->Immediate   CBC W/ AUTO DIFFERENTIAL - Abnormal; Notable for the following components:    RBC 3.72 (*)     Hematocrit 34.5 (*)     MCH 32.3 (*)     Platelets 143 (*)     MPV 9.1 (*)     All other components within normal limits    Narrative:     Release to patient->Immediate   HIV 1 / 2 ANTIBODY    Narrative:     Release to patient->Immediate   HEPATITIS C ANTIBODY    Narrative:     Release to patient->Immediate   MAGNESIUM    Narrative:     Release to patient->Immediate   TROPONIN I    Narrative:     Release to patient->Immediate   B-TYPE NATRIURETIC PEPTIDE    Narrative:     Release to patient->Immediate   TROPONIN I          Imaging Results              X-Ray Chest AP Portable (Final result)  Result time 06/14/24 19:28:22      Final result by Eric Delarosa MD (06/14/24 19:28:22)                   Impression:      No radiographic acute intrathoracic process seen.      Electronically signed by: Eric Delarosa MD  Date:    06/14/2024  Time:    19:28               Narrative:    EXAMINATION:  XR CHEST AP PORTABLE    CLINICAL HISTORY:  chest pain;    TECHNIQUE:  Single frontal view of the chest was performed.    COMPARISON:  Chest radiograph 08/30/2023 and CT thorax 03/18/2024    FINDINGS:  Monitoring leads overlie the chest.  Patient is rotated.  No detrimental change.Bibasilar minimal platelike scarring versus atelectasis.  The lungs are otherwise  well expanded without consolidation, pleural effusion or pneumothorax.    The cardiac silhouette is normal in size. The hilar and mediastinal contours are unremarkable.    Bones are intact.  Bilateral breast implants noted.  Cholecystectomy clips noted.                                       CTA Chest Abdomen Pelvis (Final result)  Result time 06/14/24 19:54:01      Final result by Eric Delarosa MD (06/14/24 19:54:01)                   Impression:      No aneurysm or dissection.  No acute findings in the chest, abdomen, or pelvis.    Electronically signed by resident: Gokul Farley  Date:    06/14/2024  Time:    19:21    Electronically signed by: Eric Delarosa MD  Date:    06/14/2024  Time:    19:54               Narrative:    EXAMINATION:  CTA CHEST ABDOMEN PELVIS    CLINICAL HISTORY:  Aortic aneurysm, known or suspected;    TECHNIQUE:  Axial images of the chest, abdomen, and pelvis were acquired before and after the use of 75 cc [id number] IV contrast.  Coronal and sagittal reconstructions were also obtained.    COMPARISON:  CT chest 03/18/2024    CTA chest 12/12/2018    FINDINGS:  Cardiovascular:    No aneurysm or dissection, although cardiac motion artifact limits assessment at the aortic root. Ascending aorta measures up to 3.8 cm, within normal limits.    No calcific atherosclerosis.    Heart normal in size.  No pericardial effusion.  Left-sided aortic arch with normal 3 vessel branching pattern.  No coronary artery calcific atherosclerosis.    Celiac, SMA, bilateral renal arteries, and ANDREZ are patent.  Bilateral iliac and visualized femoral arteries are patent.    Thoracic soft tissues: Normal thyroid. No axillary lymphadenopathy.  Bilateral breast implants.    Svetlana/Mediastinum: No mediastinal or hilar lymphadenopathy.    Lungs: Trachea and bronchi are patent.  Mild biapical fibrotic change.  Bibasilar atelectasis.  Few stable solid pulmonary micronodules.  No consolidation.  No pleural fluid or  pneumothorax.    Liver: Normal in size and contour.  No focal hepatic lesion.    Gallbladder: Surgically absent.    Bile Ducts: No evidence of dilated ducts.    Pancreas: No mass or peripancreatic fat stranding.    Spleen: Unremarkable.    Esophagus: Unremarkable.    Stomach and duodenum: Unremarkable.    Adrenals: Unremarkable.    Kidneys/Ureters: Normal in size and location. Normal enhancement. No hydronephrosis or nephrolithiasis. No ureteral dilatation.    Bladder: No evidence of wall thickening.    Reproductive organs: Unremarkable.    Bowel/Mesentery: Small bowel is normal in caliber with no evidence of obstruction.  No evidence of inflammation or wall thickening.  Appendix not visualized, surgically absent per history.  Colon demonstrates no focal wall thickening.  Diverticulosis coli.  Moderate stool burden throughout the colon suggestive of constipation.    Peritoneum: No intraperitoneal free air or fluid.    Lymph nodes: No lymphadenopathy.    Abdominal wall:  Unremarkable.    Bones: No acute fracture. No suspicious osseous lesions.                                       Medications   ondansetron injection 4 mg (4 mg Intravenous Given 6/14/24 1736)   iohexoL (OMNIPAQUE 350) injection 75 mL (75 mLs Intravenous Given 6/14/24 1846)   sodium chloride 0.9% bolus 500 mL 500 mL (0 mLs Intravenous Stopped 6/14/24 2035)     Medical Decision Making  58-year-old female who presents with chest pain and lightheadedness.  She is hypertensive, other vitals WNL.  On exam, lungs are clear to auscultation bilaterally.  Radial pulses and DP pulses are intact and symmetrical.  Motor strength and light touch sensation is intact in all extremities.  Please see physical exam findings above for additional details.  Differential diagnoses include but are not limited to ACS, CHF, aortic aneurysm, aortic dissection, electrolyte abnormality, UTI, PNA.  Moderate suspicion of aortic pathology given the patient's history of an aortic  aneurysm and her current chest pain, lightheadedness, and arm paresthesia.  Will also evaluate for cardiac etiology.  Lower suspicion of infectious etiology such as UTI or PNA since patient has not been febrile, but will still evaluate in the setting of her lightheadedness and nausea.  Ordered labs and CXR.  Ordered Zofran for nausea and a 500 mL bolus of normal saline.  Please see ED course for additional details.    Discussed clinical findings with patient and informed her that she can take Tylenol for pain.  Discussed follow up with primary care provider and precautions for when to return to the emergency department.  Answered remaining questions.  Patient will be discharged to home.    Amount and/or Complexity of Data Reviewed  Labs: ordered.  Radiology: ordered and independent interpretation performed. Decision-making details documented in ED Course.  ECG/medicine tests: ordered and independent interpretation performed. Decision-making details documented in ED Course.    Risk  Prescription drug management.               ED Course as of 06/15/24 0653   Fri Jun 14, 2024   1749 EKG 12-lead  EKG shows normal sinus rhythm, rate of 61 beats per minute, and no STEMI [MD]   1750 Patient endorses 4/10 pain in her chest and left arm but she is denying any pain medication at this time. [MD]   1957 CTA Chest Abdomen Pelvis  CTA chest abdomen pelvis shows no aneurysm or dissection.  Ascending aorta measures up to 3.8 cm, WNL.  No acute findings in the chest, abdomen, or pelvis. [MD]      ED Course User Index  [MD] Manuel Woodall MD                           Clinical Impression:  Final diagnoses:  [R07.9] Chest pain (Primary)  [R42] Lightheaded          ED Disposition Condition    Discharge Stable          ED Prescriptions    None       Follow-up Information       Follow up With Specialties Details Why Contact Info    TAMANNA Guallpa MD Internal Medicine Schedule an appointment as soon as possible for a visit   [address]  [address]  [address]  [facility] [address]  [phone]      [facility] - Emergency Dept Emergency Medicine Go to  If symptoms worsen [address]  [facility] [id number] [phone]             Jose C, MD Manuel  Resident  06/15/24 0653

## 2024-06-15 LAB
OHS QRS DURATION: 76 MS
OHS QTC CALCULATION: 455 MS

## 2024-07-16 DIAGNOSIS — F41.9 ANXIETY: ICD-10-CM

## 2024-07-16 RX ORDER — ALPRAZOLAM 1 MG/1
TABLET ORAL
Qty: 30 TABLET | Refills: 2 | Status: SHIPPED | OUTPATIENT
Start: 2024-07-16

## 2024-08-23 ENCOUNTER — TELEPHONE (OUTPATIENT)
Dept: CARDIOTHORACIC SURGERY | Facility: CLINIC | Age: 59
End: 2024-08-23
Payer: COMMERCIAL

## 2024-08-23 DIAGNOSIS — Q23.1 BICUSPID AORTIC VALVE: ICD-10-CM

## 2024-08-23 DIAGNOSIS — I71.21 ANEURYSM OF ASCENDING AORTA WITHOUT RUPTURE: Primary | ICD-10-CM

## 2024-08-23 NOTE — TELEPHONE ENCOUNTER
Called pt to schedule 6 month TAA f/u with Dr. Coates, with CT and TTE, per Dr. Coates; no answer. Left VM requesting call back to schedule.

## 2024-08-27 ENCOUNTER — TELEPHONE (OUTPATIENT)
Dept: CARDIOTHORACIC SURGERY | Facility: CLINIC | Age: 59
End: 2024-08-27
Payer: COMMERCIAL

## 2024-08-27 NOTE — TELEPHONE ENCOUNTER
Attempted to call pt and left  VM to schedule 6 month follow up with Dr. Coates .  Contact no was provided to call back.   Will follow up back tomorrow with call.

## 2024-08-28 ENCOUNTER — TELEPHONE (OUTPATIENT)
Dept: CARDIOTHORACIC SURGERY | Facility: CLINIC | Age: 59
End: 2024-08-28
Payer: COMMERCIAL

## 2024-08-28 NOTE — TELEPHONE ENCOUNTER
Attempted to call pt and left brief VM to give us a call back to schedule appointment with Dr. Coates.   Contact no was provided to call back.

## 2024-09-03 ENCOUNTER — TELEPHONE (OUTPATIENT)
Dept: CARDIOTHORACIC SURGERY | Facility: CLINIC | Age: 59
End: 2024-09-03
Payer: COMMERCIAL

## 2024-09-03 NOTE — TELEPHONE ENCOUNTER
Called pt and left VM to schedule 6 month follow up appointment with Dr. Coates with  CT and Echo prior .    Per Dr. Coates last note.   Contact no was provided for  return   call back to schedule appointment .

## 2024-09-16 ENCOUNTER — TELEPHONE (OUTPATIENT)
Dept: CARDIOTHORACIC SURGERY | Facility: CLINIC | Age: 59
End: 2024-09-16
Payer: COMMERCIAL

## 2024-09-16 NOTE — TELEPHONE ENCOUNTER
Call attempted pt to schedule 6 month follow up appointment with Dr. Coates with CT and Echo prior . Requested to call back with detailed Voice  message ,.

## 2024-09-24 ENCOUNTER — TELEPHONE (OUTPATIENT)
Dept: CARDIOTHORACIC SURGERY | Facility: CLINIC | Age: 59
End: 2024-09-24
Payer: COMMERCIAL

## 2024-09-24 NOTE — TELEPHONE ENCOUNTER
Called pt from [facility] to schedule f/u with Dr. Coates for TAA, with CT chest and TTE, per Dr. Coates.  No answer; left VM requesting call back to schedule.  Due to unsuccessful attempts to contact pt by telephone, a letter was also mailed today to pt requesting pt to contact Dr. Coates's office to schedule f/u.

## 2024-12-09 ENCOUNTER — HOSPITAL ENCOUNTER (EMERGENCY)
Facility: HOSPITAL | Age: 59
Discharge: HOME | End: 2024-12-09
Attending: STUDENT IN AN ORGANIZED HEALTH CARE EDUCATION/TRAINING PROGRAM
Payer: COMMERCIAL

## 2024-12-09 VITALS
HEIGHT: 67 IN | WEIGHT: 134.94 LBS | DIASTOLIC BLOOD PRESSURE: 60 MMHG | OXYGEN SATURATION: 100 % | BODY MASS INDEX: 21.18 KG/M2 | HEART RATE: 83 BPM | RESPIRATION RATE: 20 BRPM | SYSTOLIC BLOOD PRESSURE: 109 MMHG | TEMPERATURE: 97 F

## 2024-12-09 DIAGNOSIS — T78.2XXA ANAPHYLAXIS, INITIAL ENCOUNTER: Primary | ICD-10-CM

## 2024-12-09 LAB
ALBUMIN SERPL BCP-MCNC: 4.2 G/DL (ref 3.5–5.2)
ALP SERPL-CCNC: 80 U/L (ref 40–150)
ALT SERPL W/O P-5'-P-CCNC: 20 U/L (ref 10–44)
ANION GAP SERPL CALC-SCNC: 10 MMOL/L (ref 8–16)
AST SERPL-CCNC: 34 U/L (ref 10–40)
BASOPHILS # BLD AUTO: 0.02 K/UL (ref 0–0.2)
BASOPHILS NFR BLD: 0.4 % (ref 0–1.9)
BILIRUB SERPL-MCNC: 0.6 MG/DL (ref 0.1–1)
BUN SERPL-MCNC: 10 MG/DL (ref 6–20)
CALCIUM SERPL-MCNC: 9.3 MG/DL (ref 8.7–10.5)
CHLORIDE SERPL-SCNC: 110 MMOL/L (ref 95–110)
CO2 SERPL-SCNC: 20 MMOL/L (ref 23–29)
CREAT SERPL-MCNC: 0.8 MG/DL (ref 0.5–1.4)
DIFFERENTIAL METHOD BLD: ABNORMAL
EOSINOPHIL # BLD AUTO: 0.1 K/UL (ref 0–0.5)
EOSINOPHIL NFR BLD: 1.6 % (ref 0–8)
ERYTHROCYTE [DISTWIDTH] IN BLOOD BY AUTOMATED COUNT: 11.9 % (ref 11.5–14.5)
EST. GFR  (NO RACE VARIABLE): >60 ML/MIN/1.73 M^2
GLUCOSE SERPL-MCNC: 86 MG/DL (ref 70–110)
HCT VFR BLD AUTO: 38.6 % (ref 37–48.5)
HGB BLD-MCNC: 12.8 G/DL (ref 12–16)
IMM GRANULOCYTES # BLD AUTO: 0.02 K/UL (ref 0–0.04)
IMM GRANULOCYTES NFR BLD AUTO: 0.4 % (ref 0–0.5)
LYMPHOCYTES # BLD AUTO: 1.3 K/UL (ref 1–4.8)
LYMPHOCYTES NFR BLD: 26.3 % (ref 18–48)
MAGNESIUM SERPL-MCNC: 2 MG/DL (ref 1.6–2.6)
MCH RBC QN AUTO: 31.8 PG (ref 27–31)
MCHC RBC AUTO-ENTMCNC: 33.2 G/DL (ref 32–36)
MCV RBC AUTO: 96 FL (ref 82–98)
MONOCYTES # BLD AUTO: 0.4 K/UL (ref 0.3–1)
MONOCYTES NFR BLD: 7.8 % (ref 4–15)
NEUTROPHILS # BLD AUTO: 3.1 K/UL (ref 1.8–7.7)
NEUTROPHILS NFR BLD: 63.5 % (ref 38–73)
NRBC BLD-RTO: 0 /100 WBC
OHS QRS DURATION: 4 MS
OHS QRS DURATION: 80 MS
OHS QTC CALCULATION: 427 MS
OHS QTC CALCULATION: 483 MS
PLATELET # BLD AUTO: 166 K/UL (ref 150–450)
PMV BLD AUTO: 9.5 FL (ref 9.2–12.9)
POTASSIUM SERPL-SCNC: 4 MMOL/L (ref 3.5–5.1)
PROT SERPL-MCNC: 7.1 G/DL (ref 6–8.4)
RBC # BLD AUTO: 4.02 M/UL (ref 4–5.4)
SODIUM SERPL-SCNC: 140 MMOL/L (ref 136–145)
TROPONIN I SERPL DL<=0.01 NG/ML-MCNC: <3 NG/L (ref 0–14)
TSH SERPL DL<=0.005 MIU/L-ACNC: 0.4 UIU/ML (ref 0.4–4)
WBC # BLD AUTO: 4.86 K/UL (ref 3.9–12.7)

## 2024-12-09 PROCEDURE — 25000242 PHARM REV CODE 250 ALT 637 W/ HCPCS

## 2024-12-09 PROCEDURE — 63600175 PHARM REV CODE 636 W HCPCS: Performed by: STUDENT IN AN ORGANIZED HEALTH CARE EDUCATION/TRAINING PROGRAM

## 2024-12-09 PROCEDURE — 80053 COMPREHEN METABOLIC PANEL: CPT | Performed by: STUDENT IN AN ORGANIZED HEALTH CARE EDUCATION/TRAINING PROGRAM

## 2024-12-09 PROCEDURE — 96374 THER/PROPH/DIAG INJ IV PUSH: CPT

## 2024-12-09 PROCEDURE — 84443 ASSAY THYROID STIM HORMONE: CPT | Performed by: STUDENT IN AN ORGANIZED HEALTH CARE EDUCATION/TRAINING PROGRAM

## 2024-12-09 PROCEDURE — 94761 N-INVAS EAR/PLS OXIMETRY MLT: CPT

## 2024-12-09 PROCEDURE — 96375 TX/PRO/DX INJ NEW DRUG ADDON: CPT

## 2024-12-09 PROCEDURE — 84484 ASSAY OF TROPONIN QUANT: CPT | Performed by: STUDENT IN AN ORGANIZED HEALTH CARE EDUCATION/TRAINING PROGRAM

## 2024-12-09 PROCEDURE — 25000003 PHARM REV CODE 250: Performed by: STUDENT IN AN ORGANIZED HEALTH CARE EDUCATION/TRAINING PROGRAM

## 2024-12-09 PROCEDURE — 96372 THER/PROPH/DIAG INJ SC/IM: CPT | Performed by: STUDENT IN AN ORGANIZED HEALTH CARE EDUCATION/TRAINING PROGRAM

## 2024-12-09 PROCEDURE — 85025 COMPLETE CBC W/AUTO DIFF WBC: CPT | Performed by: STUDENT IN AN ORGANIZED HEALTH CARE EDUCATION/TRAINING PROGRAM

## 2024-12-09 PROCEDURE — 94640 AIRWAY INHALATION TREATMENT: CPT

## 2024-12-09 PROCEDURE — 83735 ASSAY OF MAGNESIUM: CPT | Performed by: STUDENT IN AN ORGANIZED HEALTH CARE EDUCATION/TRAINING PROGRAM

## 2024-12-09 PROCEDURE — 99291 CRITICAL CARE FIRST HOUR: CPT

## 2024-12-09 RX ORDER — DIPHENHYDRAMINE HYDROCHLORIDE 50 MG/ML
50 INJECTION INTRAMUSCULAR; INTRAVENOUS
Status: COMPLETED | OUTPATIENT
Start: 2024-12-09 | End: 2024-12-09

## 2024-12-09 RX ORDER — ONDANSETRON HYDROCHLORIDE 2 MG/ML
INJECTION, SOLUTION INTRAVENOUS
Status: DISCONTINUED
Start: 2024-12-09 | End: 2024-12-09 | Stop reason: HOSPADM

## 2024-12-09 RX ORDER — EPINEPHRINE 0.3 MG/.3ML
0.3 INJECTION SUBCUTANEOUS
Status: COMPLETED | OUTPATIENT
Start: 2024-12-09 | End: 2024-12-09

## 2024-12-09 RX ORDER — METHYLPREDNISOLONE SOD SUCC 125 MG
125 VIAL (EA) INJECTION
Status: COMPLETED | OUTPATIENT
Start: 2024-12-09 | End: 2024-12-09

## 2024-12-09 RX ORDER — ONDANSETRON HYDROCHLORIDE 2 MG/ML
4 INJECTION, SOLUTION INTRAVENOUS
Status: COMPLETED | OUTPATIENT
Start: 2024-12-09 | End: 2024-12-09

## 2024-12-09 RX ORDER — ALBUTEROL SULFATE 2.5 MG/.5ML
SOLUTION RESPIRATORY (INHALATION)
Status: COMPLETED
Start: 2024-12-09 | End: 2024-12-09

## 2024-12-09 RX ORDER — FAMOTIDINE 10 MG/ML
20 INJECTION INTRAVENOUS
Status: COMPLETED | OUTPATIENT
Start: 2024-12-09 | End: 2024-12-09

## 2024-12-09 RX ADMIN — METHYLPREDNISOLONE SODIUM SUCCINATE 125 MG: 125 INJECTION, POWDER, FOR SOLUTION INTRAMUSCULAR; INTRAVENOUS at 09:12

## 2024-12-09 RX ADMIN — DIPHENHYDRAMINE HYDROCHLORIDE 50 MG: 50 INJECTION, SOLUTION INTRAMUSCULAR; INTRAVENOUS at 09:12

## 2024-12-09 RX ADMIN — ONDANSETRON 4 MG: 2 INJECTION INTRAMUSCULAR; INTRAVENOUS at 09:12

## 2024-12-09 RX ADMIN — ALBUTEROL SULFATE 20 MG: 2.5 SOLUTION RESPIRATORY (INHALATION) at 09:12

## 2024-12-09 RX ADMIN — FAMOTIDINE 20 MG: 10 INJECTION INTRAVENOUS at 09:12

## 2024-12-09 RX ADMIN — EPINEPHRINE 0.3 MG: 0.3 INJECTION INTRAMUSCULAR at 09:12

## 2024-12-09 NOTE — ED NOTES
Assumed care of the patient. Report received from ANNIKA Meadows. Pt on continuous cardiac monitoring, continuous pulse oximetry, and automatic BP cuff cycling Q30 min. Pt in hospital gown, side rails up X2, bed low and locked, and call light is placed within reach. Family/visitors at bedside at this time. Pt denies any complaints or needs. Whiteboard updated with patient's plan of care.

## 2024-12-09 NOTE — PROVIDER PROGRESS NOTES - EMERGENCY DEPT.
Encounter Date: 12/9/2024    ED Physician Progress Notes          Physician Note:   Signout Note  I received signout from the previous providers, Dr. Samayoa and Dr. Batista.      Chief complaint: Anaphylaxis      Per sign out and chart review:  Hailey Sullivan is a 59 y.o. female, PMH MS, Asthma, Endocarditis, Bicuspid aortic valve, Aortic aneurysm, Rheumatic fever, GERD, Allergy to ant bites, presenting with complaints of lip swelling, difficulty breathing after ant bite 10 mins prior to arrival.  Pt does have previous hx of delayed reaction and ICU stay.     During ED stay patient received:  Medications   EPINEPHrine (EPIPEN) 0.3 mg/0.3 mL pen injection 0.3 mg (0.3 mg Intramuscular Given 12/9/24 0947)   diphenhydrAMINE injection 50 mg (50 mg Intravenous Given 12/9/24 0946)   famotidine (PF) injection 20 mg (20 mg Intravenous Given 12/9/24 0946)   methylPREDNISolone sodium succinate injection 125 mg (125 mg Intravenous Given 12/9/24 0946)   ondansetron injection 4 mg (0 mg Intravenous Hold 12/9/24 1015)   albuterol sulfate 2.5 mg/0.5 mL nebulizer solution (20 mg  Given 12/9/24 0959)        Pt signed out to me pending: Obs until 1730 and expected discharge     Update/ Disposition: Pt was observed for total of 8 hours in the ED without recurrence of her symptoms and has EpiPens at home. She is feeling well and reports feeling ready for discharge.      Patient, caregiver and/or family understands the plan and verbalized agreement. All questions answered.      Diagnostic Impression:    Anaphylaxis    Danyelle Cortes MD  [facility], PGY2

## 2024-12-09 NOTE — ED PROVIDER NOTES
Source of History  patient and EMR    Chief Complaint    Allergic Reaction (States bite by an ant less then 10 minutes ago and last time this happened she needed ICU and 4 epi shots, did not give herself epi today +SOB throat swelling lip swelling )      History of Present Illness    Hailey Sullivan is a 59 y.o. female presenting with concern for anaphylaxis.  Patient has anaphylactic reaction in the past to insect venom, specifically ant.  She reports that an ant bit her on her back.  She has had hives on her backside towards her lower back, and lip swelling with upper half of her face swelling for the last 10-15 minutes.  She has a history of similar, requiring IM epinephrine x4 doses, and an ICU stay, due to rebound anaphylaxis after epinephrine.  She did not take any medication prior to arrival. Has two epi pens but was nervous. No asthma. Reports dyspnea and chest tightness.  States she has ascending aortic aneurysm that is being watched.  On zpack for recent URI. Hx of psychosis with longer rounds of prednisone. Does not tolerate oral benadryl, but has had IV benadryl and has had IV steroids.    Review of Systems    As per HPI and below:  Review of Systems:    Pertinent information obtained as above.  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 Jensen       Past Surgical History:   Procedure Laterality Date    APPENDECTOMY      AUGMENTATION OF BREAST      BREAST SURGERY      CHOLECYSTECTOMY         Social History     Socioeconomic  History    Marital status:      Spouse name: Richie    Number of children: 1   Occupational History    Occupation:      Comment: [employer]    Occupation: Owns [employer]   Tobacco Use    Smoking status: Never    Smokeless tobacco: Never   Substance and Sexual Activity    Alcohol use: Yes     Alcohol/week: 7.0 standard drinks of alcohol     Types: 7 Glasses of wine per week     Comment: champagne only/ occasional    Drug use: No    Sexual activity: Yes     Partners: Male   Social History Narrative    Exercises.    2017 - Mother is terminally ill in [place],  in early 2018.       Family History   Problem Relation Name Age of Onset    Diabetes Mother      Cancer Mother  35        colon    Deep vein thrombosis Mother          during pregnancy    Multiple myeloma Mother      Stroke Father      Breast cancer Maternal Aunt  30    Breast cancer Maternal Aunt  50       Review of patient's allergies indicates:   Allergen Reactions    Allergen ext-venom-honey bee Swelling    Amoxicillin Nausea Only and Hives    Insect venom Anaphylaxis     Ant    Prednisone Anaphylaxis     psychosis  psychosis    Latex, natural rubber Swelling    Zithromax [azithromycin] Hives    Codeine Nausea And Vomiting    Zolpidem Nausea And Vomiting     HA  HA       No current facility-administered medications on file prior to encounter.     Current Outpatient Medications on File Prior to Encounter   Medication Sig Dispense Refill    albuterol (PROVENTIL/VENTOLIN HFA) 90 mcg/actuation inhaler Inhale 1-2 puffs into the lungs every 6 (six) hours as needed for Wheezing. Rescue (Patient not taking: Reported on 10/2/2023) 1 g 0    ALPRAZolam (XANAX) 1 MG tablet TAKE 1/2 TO 1 TABLET BY MOUTH EVERY NIGHT AS NEEDED 30 tablet 2    ASCORBIC ACID, VITAMIN C, ORAL Take by mouth.      cetirizine (ZYRTEC) 10 MG tablet Take 10 mg by mouth daily as needed.      cyanocobalamin (VITAMIN B-12) 1000 MCG tablet Place 1,000 mcg under the tongue once  daily.      EPINEPHrine (EPIPEN 2-MAYKEL) 0.3 mg/0.3 mL AtIn Inject 0.3 mLs (0.3 mg total) into the muscle once. for 1 dose 0.3 mL 0    ergocalciferol, vitamin D2, 10 mcg (400 unit) Tab Take by mouth.      fluticasone (FLONASE) 50 mcg/actuation nasal spray 2 sprays by Each Nare route once daily.      lansoprazole (PREVACID) 30 MG capsule Take 30 mg by mouth once daily.      multivitamin-zinc gluconate (MULTIVITAMINS-FORTIFIED A-D-K) 5 mg/mL Drop Take by mouth once daily.        omeprazole (PRILOSEC) 10 MG capsule Take 10 mg by mouth once daily.      ZINC ORAL Take by mouth.         Physical Exam    Reviewed nursing notes.  Vitals:    12/09/24 1330 12/09/24 1400 12/09/24 1430 12/09/24 1500   BP: (!) 109/57 (!) 110/56 137/68 (!) 115/59   BP Location: Right arm Right arm Right arm Right arm   Patient Position: Lying Lying Lying Lying   Pulse: 85 83 92 87   Resp: 18 20 20 18   Temp:    97.1 °F (36.2 °C)   TempSrc:    Oral   SpO2: 100% 100% 100% 100%   Weight:       Height:         General:  Alert, moderate distress  Skin:  Warm, dry, intact.  Hives on back, one larger raised lesion on the R upper shoulder  Head:  Normocephalic, atraumatic.    Neck:  Supple.   Eye:  Normal conjunctiva.   Ears, nose, mouth and throat:  Normal phonation. Normal oropharynx. No uvular edema. No hoarseness. No oropharyngeal edema.   Cardiovascular:  Regular rate and rhythm, Normal peripheral perfusion, No edema.  Pulses intact in all extremities.  Respiratory: respirations are labored. Tight, dry cough. No wheezing.  Gastrointestinal:   Soft, non tender, Non distended.   Back:  Nontender.    Neurological:  Alert and oriented to person, place, time, and situation.  Nonfocal exam.   Psychiatric:  Cooperative, appropriate mood & affect.        Initial MDM and Data Review    59 y.o. female presenting for evaluation of anaphylaxis to ant venom. Previous history of similar.     Epi IM x 1, benadryl, famotidine, solumedrol    Differential includes but  is not limited to: anaphylaxis ( hives + labored breathing), ptx, anxiety, dehydration, doubt dissection/aneurysm    Work-up includes: CXR, basic labs / EKG/trop x 1    Interventions include: as above    The patient has significant medical comorbidities that influence decision making for this acute process, such as: prior anaphylaxis    I decided to obtain the patient's medical records and review relevant documentation from hospital records and clinic records.  Pertinent information is noted.    Medications   EPINEPHrine (EPIPEN) 0.3 mg/0.3 mL pen injection 0.3 mg (0.3 mg Intramuscular Given 12/9/24 0947)   diphenhydrAMINE injection 50 mg (50 mg Intravenous Given 12/9/24 0946)   famotidine (PF) injection 20 mg (20 mg Intravenous Given 12/9/24 0946)   methylPREDNISolone sodium succinate injection 125 mg (125 mg Intravenous Given 12/9/24 0946)   ondansetron injection 4 mg (0 mg Intravenous Hold 12/9/24 1015)   albuterol sulfate 2.5 mg/0.5 mL nebulizer solution (20 mg  Given 12/9/24 0959)       Results and ED Course    Labs Reviewed   CBC W/ AUTO DIFFERENTIAL - Abnormal       Result Value    WBC 4.86      RBC 4.02      Hemoglobin 12.8      Hematocrit 38.6      MCV 96      MCH 31.8 (*)     MCHC 33.2      RDW 11.9      Platelets 166      MPV 9.5      Immature Granulocytes 0.4      Gran # (ANC) 3.1      Immature Grans (Abs) 0.02      Lymph # 1.3      Mono # 0.4      Eos # 0.1      Baso # 0.02      nRBC 0      Gran % 63.5      Lymph % 26.3      Mono % 7.8      Eosinophil % 1.6      Basophil % 0.4      Differential Method Automated     COMPREHENSIVE METABOLIC PANEL - Abnormal    Sodium 140      Potassium 4.0      Chloride 110      CO2 20 (*)     Glucose 86      BUN 10      Creatinine 0.8      Calcium 9.3      Total Protein 7.1      Albumin 4.2      Total Bilirubin 0.6      Alkaline Phosphatase 80      AST 34      ALT 20      eGFR >60.0      Anion Gap 10     TSH    TSH 0.400     MAGNESIUM    Magnesium 2.0     TROPONIN I HIGH  SENSITIVITY    Troponin I High Sensitivity <3         Imaging Results              X-Ray Chest AP Portable (Final result)  Result time 12/09/24 10:48:03      Final result by Anthony Tijerina MD (12/09/24 10:48:03)                   Impression:      See above      Electronically signed by: Anthony Tijerina MD  Date:    12/09/2024  Time:    10:48               Narrative:    EXAMINATION:  XR CHEST AP PORTABLE    CLINICAL HISTORY:  dyspnea;    TECHNIQUE:  Single frontal view of the chest was performed.    COMPARISON:  06/14/2024    FINDINGS:  Cardiac size is normal.  Lungs are clear and well aerated.                                      ED Course as of 12/09/24 1539   Mon Dec 09, 2024   1007 Called to bedside, due to patient stating that she could not breathe.  She had just been given epinephrine.  She has clear lung sounds.  Normal oxygenation.  I did add albuterol continuous for anaphylaxis and possible bronchospasm.  It seems that she had this reaction right after getting the epinephrine IM. Could be related to effect of epi. She is trembling and has some nausea.  Oropharynx appears to be clear.  No evidence of swelling.  Phonation still normal.  Swelling remains of the lips.  No significant tongue swelling.  I talked to the patient's  at her request - he is on the way. [AC]   1038 X-Ray Chest AP Portable  Clear lungs, no widened mediastinum, no evidence of pneumothorax [AC]   1054 Patient is improved.  Vital signs remain within normal range.  Oropharynx is clear. [AC]   1500 Tolerated p.o..  No further nausea. [AC]   1530 Patient tolerating p.o., well-appearing, no further episodes.  No desaturations.  Some mild swelling still present at the lip but improving significantly.    We discussed observation for admission.  Patient was a bit hesitant about admission, so we discussed alternative such as extended observation in the emergency department for 4 additional hours to ensure no rebound the patient was  amenable to this.  Her  is at bedside.  She has 2 EpiPen at home.  No need for additional prescription.  Referral to Allergy sent. Has safe plan with  watching overnight, and return precautions discussed  Signed out to oncoming attending, repeat assessment at 5:30 p.m.. [AC]      ED Course User Index  [AC] Alex Samayoa DO               EKG interpreted by myself    EKG  Performed: 12/09/2024   Rate/Rhythm/Axis: unable to interpret  Impression: significant artifact from trembling  Needs repeat    EKG independently interpreted by me.    Performed: 12/09/2024   Rate/Rhythm/Axis:  81 bpm, sinus rhythm, left axis  QRS 80 ms  Qtc 483 ms  Impression:  Left axis deviation with Q-wave in V leads, poor R-wave progression with T-wave flattening in sub mm depression in inferior leads  Looks very similar to prior from 2024 June and 2018        Relevant imaging interpreted by myself  CXR - no acute disease    Impression and Plan    59 y.o. female with findings of anaphylaxis based on the work up in the emergency department as above.        All tests, treatment options and disposition options were discussed with the patient.  The decision was made to observe the patient in the ED while awaiting further work up.    The patient was signed out to oncoming attending in stable condition and all further questions/concerns by patient and/or family were addressed.    Critical Care:  Date: 12/09/2024  Performed by: Dr. Alex Samayoa  Authorized by: Dr. Alex Samayoa   Total critical care time (exclusive of procedural time) : 45 minutes  Upon my evaluation, this patient had a high probability of imminent or life-threatening deterioration due to [ anaphylaxis ] which required my direct attention, intervention and personal management.  Critical care was necessary to treat or prevent imminent or life-threatening deterioration.  This may include review of laboratory data, radiology results, discussion with consultants and  family, and monitoring for potential decompensations. Interventions were performed as documented.                   Final diagnoses:  [T78.2XXA] Anaphylaxis, initial encounter (Primary)                 Alex Samayoa,   12/09/24 3543

## 2024-12-09 NOTE — DISCHARGE INSTRUCTIONS
A bad allergic reaction can affect your whole body. Doctors call it an anaphylactic reaction. Your immune system may have reacted to food or medicine. Or maybe you had an insect bite or sting. This kind of reaction can happen the first time you come into contact with a substance. Or it may take many times before a substance causes a problem.  You need to get help right away if your body reacts like this again.  Follow-up care is a key part of your treatment and safety. Be sure to make and go to all appointments, and call your doctor or nurse advice line if you are having problems. It's also a good idea to know your test results and keep a list of the medicines you take.    How can you care for yourself at home?  If your doctor has prescribed medicine, such as an antihistamine, take it exactly as directed. Call your doctor or nurse advice line if you think you are having a problem with your medicine.  Learn all you can about your allergies. You may be able to avoid a severe response when you do or don't do certain things. For instance, you can check food or drug labels for contents that might cause problems.  Your doctor may prescribe a shot of epinephrine to carry with you in case you have a severe reaction. Learn how to give yourself the shot. Keep it with you at all times. Make sure it has not .  Wear medical alert [employer] that lists your allergies.  Teach people around you about your allergies. Tell them what you need to avoid. Teach them what to do if you have a reaction.  Before you take any medicine, tell your doctor if you have had a bad response to any medicines in the past.    When should you call for help?    Give an epinephrine shot if:  You think you are having a severe allergic reaction.  You have symptoms in more than one body area, such as mild nausea and an itchy mouth.  After giving an epinephrine shot, call 911 even if you feel better.  Call 911 if:  You have symptoms of a severe allergic  reaction. These may include:  Sudden raised, red areas (hives) all over your body.  Swelling of the throat, mouth, lips, or tongue.  Trouble breathing.  Passing out (losing consciousness). Or you may feel very light-headed or suddenly feel weak, confused, or restless.  Severe belly pain, nausea, vomiting, or diarrhea.  You have been given an epinephrine shot, even if you feel better.  Call your doctor or nurse advice line now or seek immediate medical care if:  You have symptoms of an allergic reaction, such as:  A rash or hives.  Itching.  Swelling.  Mild belly pain or nausea.  Watch closely for changes in your health, and be sure to contact your doctor or nurse advice line if:  You do not get better as expected.

## 2024-12-09 NOTE — Clinical Note
"Hailey Almonte" Claudio Sullivan was seen and treated in our emergency department on 12/9/2024.  She may return to work on 12/10/2024.       If you have any questions or concerns, please don't hesitate to call.      Alex Samayoa, DO"

## 2024-12-09 NOTE — ED TRIAGE NOTES
Hailey Sullivan, a 59 y.o. female presents to the ED w/ complaint of allergic reaction. Patient was bitten by an ant on her right upper back. Patient states she is highly allergic to ants and last time this happened she received multiple shots of epi and ended up in the ICU. Patient endorses SOB and lip swelling noted.     Triage note:  Chief Complaint   Patient presents with    Allergic Reaction     States bite by an ant less then 10 minutes ago and last time this happened she needed ICU and 4 epi shots, did not give herself epi today +SOB throat swelling lip swelling      Review of patient's allergies indicates:   Allergen Reactions    Allergen ext-venom-honey bee Swelling    Amoxicillin Nausea Only and Hives    Insect venom Anaphylaxis     Ant    Prednisone Anaphylaxis     psychosis  psychosis    Latex, natural rubber Swelling    Zithromax [azithromycin] Hives    Codeine Nausea And Vomiting    Zolpidem Nausea And Vomiting     HA  HA     Past Medical History:   Diagnosis Date    Asthma, currently dormant 5/13/2013    Constipation, chronic 5/16/2014    IBS - D    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 Jensen

## 2024-12-10 ENCOUNTER — OFFICE VISIT (OUTPATIENT)
Dept: INTERNAL MEDICINE | Facility: CLINIC | Age: 59
End: 2024-12-10
Attending: INTERNAL MEDICINE
Payer: COMMERCIAL

## 2024-12-10 VITALS
DIASTOLIC BLOOD PRESSURE: 86 MMHG | OXYGEN SATURATION: 97 % | BODY MASS INDEX: 19.62 KG/M2 | HEIGHT: 67 IN | HEART RATE: 75 BPM | SYSTOLIC BLOOD PRESSURE: 116 MMHG | WEIGHT: 125 LBS

## 2024-12-10 DIAGNOSIS — R21 RASH: ICD-10-CM

## 2024-12-10 DIAGNOSIS — G35 MS (MULTIPLE SCLEROSIS): ICD-10-CM

## 2024-12-10 DIAGNOSIS — I08.0 MILD MITRAL AND AORTIC REGURGITATION: Primary | ICD-10-CM

## 2024-12-10 DIAGNOSIS — L29.9 ITCHING: ICD-10-CM

## 2024-12-10 DIAGNOSIS — Z91.038 ALLERGY TO ANT BITE: ICD-10-CM

## 2024-12-10 DIAGNOSIS — R09.81 SINUS CONGESTION: ICD-10-CM

## 2024-12-10 DIAGNOSIS — R09.81 NASAL CONGESTION: ICD-10-CM

## 2024-12-10 DIAGNOSIS — Q23.81 BICUSPID AORTIC VALVE: ICD-10-CM

## 2024-12-10 RX ORDER — ALBUTEROL SULFATE 90 UG/1
1-2 INHALANT RESPIRATORY (INHALATION) EVERY 6 HOURS PRN
Qty: 18 G | Refills: 3 | Status: SHIPPED | OUTPATIENT
Start: 2024-12-10 | End: 2025-12-10

## 2024-12-10 RX ORDER — TRIAMCINOLONE ACETONIDE 1 MG/G
CREAM TOPICAL 3 TIMES DAILY PRN
Qty: 90 G | Refills: 1 | Status: SHIPPED | OUTPATIENT
Start: 2024-12-10

## 2024-12-10 NOTE — PATIENT INSTRUCTIONS
Take Zyrtec 10 mg twice each day and Pepcid 20 mg twice each day for the next several days.  Use Kenalog cream as needed for itching.  Take albuterol 1-2 puffs every 6 hours as needed for shortness of breath.

## 2024-12-10 NOTE — PROGRESS NOTES
Subjective     Patient ID: Hailey Sullivan is a 59 y.o. female.    Chief Complaint: Follow-up (Hosp f/u red ant bites, was running fever this morning, face was red, rash )    A few days ago she started a Z-Bob for nasal and sinus congestion.  Yesterday she was bit on the right posterior shoulder by a red ant.  She went to the emergency room due to her history of anaphylaxis from and bites.  While there she developed a rash and difficulty breathing.  She received epinephrine, 50 mg of IV Benadryl, 125 mg of IV Solu-Medrol, albuterol nebulizer, and Pepcid 20 mg.  She had significant improvement and went home.  She had difficulty sleeping last night due to the stimulant effect the albuterol, epinephrine, and Solu-Medrol.  This morning she developed redness in her face, rash, mild swelling of the lips, and a temperature of 100.0°.  She took Zyrtec 10 mg this morning with some improvement.  She denies any shortness of breath.      Review of Systems   Constitutional:  Positive for fever.   Respiratory:  Negative for shortness of breath.    Cardiovascular:  Negative for chest pain.   Integumentary:  Positive for rash.          Objective     Physical Exam  Vitals and nursing note reviewed.   Constitutional:       Appearance: She is well-developed.   HENT:      Head: Normocephalic.      Comments: She has a butterfly rash over her cheeks and nose  Eyes:      Pupils: Pupils are equal, round, and reactive to light.   Cardiovascular:      Rate and Rhythm: Normal rate and regular rhythm.      Heart sounds: Murmur heard.      Crescendo decrescendo systolic murmur is present with a grade of 2/6.      Comments: @RUSB  Pulmonary:      Effort: Pulmonary effort is normal.   Abdominal:      General: Bowel sounds are decreased.      Palpations: Abdomen is soft.   Skin:     Findings: Rash present.      Comments: She has a very fine rash over her right back.   Neurological:      Mental Status: She is alert.             Assessment and Plan     1. Mild mitral and aortic regurgitation  Overview:  Echo 9/12, 9/14, 8/16  with MVP  Echo 8/18, 5/19, 12/20 - Mild MVR, Mild/Mod AoVR      2. Allergy to ant bite  Overview:  Anaphylaxis      3. Bicuspid aortic valve  Overview:  Mild AoVR - 8/16  Mild/Mod AoVR and mild AS - 8/18, 5/19, 12/20      4. MS (multiple sclerosis)  Overview:  2004  MRI Brain 9/23 - No active demyelinization  MRI C,T, and L-spine - 9/23      5. Rash    6. Itching    7. Nasal congestion    8. Sinus congestion    Other orders  -     triamcinolone acetonide 0.1% (KENALOG) 0.1 % cream; Apply topically 3 (three) times daily as needed (itching).  Dispense: 90 g; Refill: 1  -     albuterol (PROVENTIL/VENTOLIN HFA) 90 mcg/actuation inhaler; Inhale 1-2 puffs into the lungs every 6 (six) hours as needed for Wheezing. Rescue  Dispense: 18 g; Refill: 3        PLAN:  Per orders and D/C instructions.  Continue diet and/or meds for mitral valve regurgitation, aortic valve regurgitation with bicuspid aortic valve, and MS, which are stable.  Finish Z-Bob for recent nasal and sinus congestion   Zyrtec 10 mg twice each day and Pepcid 10 mg twice each day for allergic reaction to ant bite.  Kenalog cream for rash and itching.  Albuterol inhaler if needed.  She has to EpiPen if needed.    Between 30 and 39 min of total time for evaluation and management services were spent on the patient today.  The medical problems and treatment options were discussed, and all questions were answered.      Follow up in about 4 months (around 4/10/2025).

## 2025-01-08 DIAGNOSIS — F41.9 ANXIETY: ICD-10-CM

## 2025-01-08 RX ORDER — ALPRAZOLAM 1 MG/1
TABLET ORAL
Qty: 30 TABLET | Refills: 2 | Status: SHIPPED | OUTPATIENT
Start: 2025-01-08

## 2025-03-27 ENCOUNTER — PATIENT OUTREACH (OUTPATIENT)
Dept: ADMINISTRATIVE | Facility: HOSPITAL | Age: 60
End: 2025-03-27
Payer: COMMERCIAL

## 2025-04-16 ENCOUNTER — PATIENT OUTREACH (OUTPATIENT)
Dept: ADMINISTRATIVE | Facility: HOSPITAL | Age: 60
End: 2025-04-16
Payer: COMMERCIAL

## 2025-04-16 DIAGNOSIS — Z12.31 ENCOUNTER FOR SCREENING MAMMOGRAM FOR BREAST CANCER: Primary | ICD-10-CM

## 2025-05-19 ENCOUNTER — CLINICAL SUPPORT (OUTPATIENT)
Dept: INTERNAL MEDICINE | Facility: CLINIC | Age: 60
End: 2025-05-19
Payer: COMMERCIAL

## 2025-05-19 DIAGNOSIS — J32.9 SINUSITIS, UNSPECIFIED CHRONICITY, UNSPECIFIED LOCATION: Primary | ICD-10-CM

## 2025-05-19 PROCEDURE — 96372 THER/PROPH/DIAG INJ SC/IM: CPT | Mod: S$GLB,,, | Performed by: INTERNAL MEDICINE

## 2025-05-19 RX ORDER — METHYLPREDNISOLONE ACETATE 80 MG/ML
80 INJECTION, SUSPENSION INTRA-ARTICULAR; INTRALESIONAL; INTRAMUSCULAR; SOFT TISSUE
Status: COMPLETED | OUTPATIENT
Start: 2025-05-19 | End: 2025-05-19

## 2025-05-19 RX ORDER — TRIAMCINOLONE ACETONIDE 40 MG/ML
40 INJECTION, SUSPENSION INTRA-ARTICULAR; INTRAMUSCULAR
Status: COMPLETED | OUTPATIENT
Start: 2025-05-19 | End: 2025-05-19

## 2025-05-19 RX ORDER — AZITHROMYCIN 250 MG/1
TABLET, FILM COATED ORAL
Qty: 6 TABLET | Refills: 0 | Status: SHIPPED | OUTPATIENT
Start: 2025-05-19

## 2025-05-19 RX ADMIN — METHYLPREDNISOLONE ACETATE 80 MG: 80 INJECTION, SUSPENSION INTRA-ARTICULAR; INTRALESIONAL; INTRAMUSCULAR; SOFT TISSUE at 01:05

## 2025-05-19 RX ADMIN — TRIAMCINOLONE ACETONIDE 40 MG: 40 INJECTION, SUSPENSION INTRA-ARTICULAR; INTRAMUSCULAR at 01:05

## 2025-05-21 DIAGNOSIS — F41.9 ANXIETY: ICD-10-CM

## 2025-05-21 RX ORDER — ALPRAZOLAM 1 MG/1
TABLET ORAL
Qty: 30 TABLET | Refills: 1 | Status: SHIPPED | OUTPATIENT
Start: 2025-05-21

## 2025-08-14 ENCOUNTER — TELEPHONE (OUTPATIENT)
Dept: CARDIOTHORACIC SURGERY | Facility: CLINIC | Age: 60
End: 2025-08-14
Payer: COMMERCIAL

## 2025-08-14 ENCOUNTER — HOSPITAL ENCOUNTER (EMERGENCY)
Facility: HOSPITAL | Age: 60
Discharge: LEFT BEFORE DISCHARGE | End: 2025-08-14
Attending: EMERGENCY MEDICINE
Payer: COMMERCIAL

## 2025-08-14 VITALS
HEIGHT: 67 IN | TEMPERATURE: 98 F | DIASTOLIC BLOOD PRESSURE: 79 MMHG | BODY MASS INDEX: 19.62 KG/M2 | OXYGEN SATURATION: 99 % | RESPIRATION RATE: 18 BRPM | WEIGHT: 125 LBS | SYSTOLIC BLOOD PRESSURE: 125 MMHG | HEART RATE: 71 BPM

## 2025-08-14 DIAGNOSIS — I71.21 ANEURYSM OF ASCENDING AORTA WITHOUT RUPTURE: ICD-10-CM

## 2025-08-14 DIAGNOSIS — R79.89 ELEVATED BRAIN NATRIURETIC PEPTIDE (BNP) LEVEL: Primary | ICD-10-CM

## 2025-08-14 DIAGNOSIS — R07.9 CHEST PAIN: ICD-10-CM

## 2025-08-14 LAB
ABSOLUTE EOSINOPHIL (OHS): 0.09 K/UL
ABSOLUTE MONOCYTE (OHS): 0.42 K/UL (ref 0.3–1)
ABSOLUTE NEUTROPHIL COUNT (OHS): 2.46 K/UL (ref 1.8–7.7)
ALBUMIN SERPL BCP-MCNC: 4.5 G/DL (ref 3.5–5.2)
ALP SERPL-CCNC: 72 UNIT/L (ref 40–150)
ALT SERPL W/O P-5'-P-CCNC: 23 UNIT/L (ref 0–55)
ANION GAP (OHS): 13 MMOL/L (ref 8–16)
AST SERPL-CCNC: 32 UNIT/L (ref 0–50)
BASOPHILS # BLD AUTO: 0.01 K/UL
BASOPHILS NFR BLD AUTO: 0.2 %
BILIRUB SERPL-MCNC: 0.6 MG/DL (ref 0.1–1)
BUN SERPL-MCNC: 13 MG/DL (ref 6–20)
CALCIUM SERPL-MCNC: 9.9 MG/DL (ref 8.7–10.5)
CHLORIDE SERPL-SCNC: 101 MMOL/L (ref 95–110)
CO2 SERPL-SCNC: 26 MMOL/L (ref 23–29)
CREAT SERPL-MCNC: 0.8 MG/DL (ref 0.5–1.4)
ERYTHROCYTE [DISTWIDTH] IN BLOOD BY AUTOMATED COUNT: 12 % (ref 11.5–14.5)
GFR SERPLBLD CREATININE-BSD FMLA CKD-EPI: >60 ML/MIN/1.73/M2
GLUCOSE SERPL-MCNC: 87 MG/DL (ref 70–110)
HCT VFR BLD AUTO: 39.1 % (ref 37–48.5)
HCV AB SERPL QL IA: NORMAL
HGB BLD-MCNC: 13.4 GM/DL (ref 12–16)
HIV 1+2 AB+HIV1 P24 AG SERPL QL IA: NORMAL
IMM GRANULOCYTES # BLD AUTO: 0.01 K/UL (ref 0–0.04)
IMM GRANULOCYTES NFR BLD AUTO: 0.2 % (ref 0–0.5)
LYMPHOCYTES # BLD AUTO: 1.47 K/UL (ref 1–4.8)
MCH RBC QN AUTO: 32.7 PG (ref 27–31)
MCHC RBC AUTO-ENTMCNC: 34.3 G/DL (ref 32–36)
MCV RBC AUTO: 95 FL (ref 82–98)
NT-PROBNP SERPL-MCNC: 354 PG/ML
NUCLEATED RBC (/100WBC) (OHS): 0 /100 WBC
OHS QRS DURATION: 72 MS
OHS QTC CALCULATION: 422 MS
PLATELET # BLD AUTO: 183 K/UL (ref 150–450)
PMV BLD AUTO: 9.2 FL (ref 9.2–12.9)
POTASSIUM SERPL-SCNC: 3.8 MMOL/L (ref 3.5–5.1)
PROT SERPL-MCNC: 7.4 GM/DL (ref 6–8.4)
RBC # BLD AUTO: 4.1 M/UL (ref 4–5.4)
RELATIVE EOSINOPHIL (OHS): 2 %
RELATIVE LYMPHOCYTE (OHS): 33 % (ref 18–48)
RELATIVE MONOCYTE (OHS): 9.4 % (ref 4–15)
RELATIVE NEUTROPHIL (OHS): 55.2 % (ref 38–73)
SODIUM SERPL-SCNC: 140 MMOL/L (ref 136–145)
TROPONIN I SERPL HS-MCNC: <3 NG/L
TROPONIN I SERPL HS-MCNC: <3 NG/L
WBC # BLD AUTO: 4.46 K/UL (ref 3.9–12.7)

## 2025-08-14 PROCEDURE — 82040 ASSAY OF SERUM ALBUMIN: CPT | Performed by: STUDENT IN AN ORGANIZED HEALTH CARE EDUCATION/TRAINING PROGRAM

## 2025-08-14 PROCEDURE — 84484 ASSAY OF TROPONIN QUANT: CPT | Performed by: STUDENT IN AN ORGANIZED HEALTH CARE EDUCATION/TRAINING PROGRAM

## 2025-08-14 PROCEDURE — 86803 HEPATITIS C AB TEST: CPT | Performed by: PHYSICIAN ASSISTANT

## 2025-08-14 PROCEDURE — 85025 COMPLETE CBC W/AUTO DIFF WBC: CPT | Performed by: STUDENT IN AN ORGANIZED HEALTH CARE EDUCATION/TRAINING PROGRAM

## 2025-08-14 PROCEDURE — 84484 ASSAY OF TROPONIN QUANT: CPT | Performed by: PHYSICIAN ASSISTANT

## 2025-08-14 PROCEDURE — 87389 HIV-1 AG W/HIV-1&-2 AB AG IA: CPT | Performed by: PHYSICIAN ASSISTANT

## 2025-08-14 PROCEDURE — 83880 ASSAY OF NATRIURETIC PEPTIDE: CPT | Performed by: STUDENT IN AN ORGANIZED HEALTH CARE EDUCATION/TRAINING PROGRAM

## 2025-08-14 PROCEDURE — 99285 EMERGENCY DEPT VISIT HI MDM: CPT | Mod: 25

## 2025-08-14 PROCEDURE — 93010 ELECTROCARDIOGRAM REPORT: CPT | Mod: ,,, | Performed by: INTERNAL MEDICINE

## 2025-08-14 PROCEDURE — 93005 ELECTROCARDIOGRAM TRACING: CPT

## 2025-08-14 RX ORDER — SUCRALFATE 1 G/10ML
1 SUSPENSION ORAL
Status: DISCONTINUED | OUTPATIENT
Start: 2025-08-14 | End: 2025-08-14 | Stop reason: HOSPADM

## 2025-08-16 ENCOUNTER — HOSPITAL ENCOUNTER (OUTPATIENT)
Dept: RADIOLOGY | Facility: OTHER | Age: 60
Discharge: HOME | End: 2025-08-16
Attending: THORACIC SURGERY (CARDIOTHORACIC VASCULAR SURGERY)
Payer: COMMERCIAL

## 2025-08-16 DIAGNOSIS — I71.21 ANEURYSM OF ASCENDING AORTA WITHOUT RUPTURE: ICD-10-CM

## 2025-08-16 PROCEDURE — 71250 CT THORAX DX C-: CPT | Mod: 26,,, | Performed by: RADIOLOGY

## 2025-08-16 PROCEDURE — 71250 CT THORAX DX C-: CPT | Mod: TC

## 2025-08-17 LAB — HOLD SPECIMEN: NORMAL

## 2025-08-18 ENCOUNTER — OFFICE VISIT (OUTPATIENT)
Dept: CARDIOTHORACIC SURGERY | Facility: CLINIC | Age: 60
End: 2025-08-18
Payer: COMMERCIAL

## 2025-08-18 VITALS
OXYGEN SATURATION: 96 % | WEIGHT: 119.25 LBS | HEIGHT: 67 IN | DIASTOLIC BLOOD PRESSURE: 81 MMHG | SYSTOLIC BLOOD PRESSURE: 127 MMHG | BODY MASS INDEX: 18.72 KG/M2 | HEART RATE: 58 BPM

## 2025-08-18 DIAGNOSIS — Q23.81 BICUSPID AORTIC VALVE: ICD-10-CM

## 2025-08-18 DIAGNOSIS — I71.21 ANEURYSM OF ASCENDING AORTA WITHOUT RUPTURE: Primary | ICD-10-CM

## 2025-08-18 PROCEDURE — 1160F RVW MEDS BY RX/DR IN RCRD: CPT | Mod: CPTII,S$GLB,, | Performed by: THORACIC SURGERY (CARDIOTHORACIC VASCULAR SURGERY)

## 2025-08-18 PROCEDURE — 99215 OFFICE O/P EST HI 40 MIN: CPT | Mod: S$GLB,,, | Performed by: THORACIC SURGERY (CARDIOTHORACIC VASCULAR SURGERY)

## 2025-08-18 PROCEDURE — 99999 PR PBB SHADOW E&M-EST. PATIENT-LVL IV: CPT | Mod: PBBFAC,,, | Performed by: THORACIC SURGERY (CARDIOTHORACIC VASCULAR SURGERY)

## 2025-08-18 PROCEDURE — 1159F MED LIST DOCD IN RCRD: CPT | Mod: CPTII,S$GLB,, | Performed by: THORACIC SURGERY (CARDIOTHORACIC VASCULAR SURGERY)

## 2025-08-18 PROCEDURE — 3008F BODY MASS INDEX DOCD: CPT | Mod: CPTII,S$GLB,, | Performed by: THORACIC SURGERY (CARDIOTHORACIC VASCULAR SURGERY)

## 2025-08-18 PROCEDURE — 3074F SYST BP LT 130 MM HG: CPT | Mod: CPTII,S$GLB,, | Performed by: THORACIC SURGERY (CARDIOTHORACIC VASCULAR SURGERY)

## 2025-08-18 PROCEDURE — 3079F DIAST BP 80-89 MM HG: CPT | Mod: CPTII,S$GLB,, | Performed by: THORACIC SURGERY (CARDIOTHORACIC VASCULAR SURGERY)

## 2025-08-18 RX ORDER — FAMOTIDINE 10 MG/ML
INJECTION, SOLUTION INTRAVENOUS
COMMUNITY
Start: 2025-08-13